# Patient Record
Sex: MALE | Race: WHITE | HISPANIC OR LATINO | Employment: OTHER | URBAN - METROPOLITAN AREA
[De-identification: names, ages, dates, MRNs, and addresses within clinical notes are randomized per-mention and may not be internally consistent; named-entity substitution may affect disease eponyms.]

---

## 2018-12-14 ENCOUNTER — HOSPITAL ENCOUNTER (EMERGENCY)
Facility: HOSPITAL | Age: 83
Discharge: HOME | End: 2018-12-15
Attending: EMERGENCY MEDICINE | Admitting: EMERGENCY MEDICINE
Payer: MEDICARE

## 2018-12-14 ENCOUNTER — APPOINTMENT (EMERGENCY)
Dept: RADIOLOGY | Facility: HOSPITAL | Age: 83
End: 2018-12-14
Payer: MEDICARE

## 2018-12-14 DIAGNOSIS — J45.909 ASTHMATIC BRONCHITIS: Primary | ICD-10-CM

## 2018-12-14 LAB
BASOPHILS # BLD AUTO: 0.06 THOUSANDS/ΜL (ref 0–0.1)
BASOPHILS NFR BLD AUTO: 1 % (ref 0–1)
EOSINOPHIL # BLD AUTO: 0.9 THOUSAND/ΜL (ref 0–0.61)
EOSINOPHIL NFR BLD AUTO: 13 % (ref 0–6)
ERYTHROCYTE [DISTWIDTH] IN BLOOD BY AUTOMATED COUNT: 12.8 % (ref 11.6–15.1)
HCT VFR BLD AUTO: 40.8 % (ref 36.5–49.3)
HGB BLD-MCNC: 13.7 G/DL (ref 12–17)
IMM GRANULOCYTES # BLD AUTO: 0.02 THOUSAND/UL (ref 0–0.2)
IMM GRANULOCYTES NFR BLD AUTO: 0 % (ref 0–2)
LYMPHOCYTES # BLD AUTO: 1.41 THOUSANDS/ΜL (ref 0.6–4.47)
LYMPHOCYTES NFR BLD AUTO: 21 % (ref 14–44)
MCH RBC QN AUTO: 31.1 PG (ref 26.8–34.3)
MCHC RBC AUTO-ENTMCNC: 33.6 G/DL (ref 31.4–37.4)
MCV RBC AUTO: 93 FL (ref 82–98)
MONOCYTES # BLD AUTO: 0.58 THOUSAND/ΜL (ref 0.17–1.22)
MONOCYTES NFR BLD AUTO: 9 % (ref 4–12)
NEUTROPHILS # BLD AUTO: 3.87 THOUSANDS/ΜL (ref 1.85–7.62)
NEUTS SEG NFR BLD AUTO: 56 % (ref 43–75)
NRBC BLD AUTO-RTO: 0 /100 WBCS
PLATELET # BLD AUTO: 248 THOUSANDS/UL (ref 149–390)
PMV BLD AUTO: 9.3 FL (ref 8.9–12.7)
RBC # BLD AUTO: 4.41 MILLION/UL (ref 3.88–5.62)
WBC # BLD AUTO: 6.84 THOUSAND/UL (ref 4.31–10.16)

## 2018-12-14 PROCEDURE — 94640 AIRWAY INHALATION TREATMENT: CPT

## 2018-12-14 PROCEDURE — 99284 EMERGENCY DEPT VISIT MOD MDM: CPT

## 2018-12-14 PROCEDURE — 80053 COMPREHEN METABOLIC PANEL: CPT | Performed by: EMERGENCY MEDICINE

## 2018-12-14 PROCEDURE — 83735 ASSAY OF MAGNESIUM: CPT | Performed by: EMERGENCY MEDICINE

## 2018-12-14 PROCEDURE — 93005 ELECTROCARDIOGRAM TRACING: CPT

## 2018-12-14 PROCEDURE — 85025 COMPLETE CBC W/AUTO DIFF WBC: CPT | Performed by: EMERGENCY MEDICINE

## 2018-12-14 PROCEDURE — 83880 ASSAY OF NATRIURETIC PEPTIDE: CPT | Performed by: EMERGENCY MEDICINE

## 2018-12-14 PROCEDURE — 84484 ASSAY OF TROPONIN QUANT: CPT | Performed by: EMERGENCY MEDICINE

## 2018-12-14 PROCEDURE — 36415 COLL VENOUS BLD VENIPUNCTURE: CPT | Performed by: EMERGENCY MEDICINE

## 2018-12-14 PROCEDURE — 71045 X-RAY EXAM CHEST 1 VIEW: CPT

## 2018-12-14 PROCEDURE — 96374 THER/PROPH/DIAG INJ IV PUSH: CPT

## 2018-12-14 RX ORDER — LEVOTHYROXINE SODIUM 0.1 MG/1
112 TABLET ORAL DAILY
Status: ON HOLD | COMMUNITY
End: 2019-12-18 | Stop reason: SDUPTHER

## 2018-12-14 RX ORDER — ATORVASTATIN CALCIUM 20 MG/1
20 TABLET, FILM COATED ORAL DAILY
COMMUNITY
End: 2020-09-21 | Stop reason: SDUPTHER

## 2018-12-14 RX ORDER — GLIPIZIDE 5 MG/1
5 TABLET, FILM COATED, EXTENDED RELEASE ORAL 2 TIMES DAILY
COMMUNITY
End: 2020-09-21 | Stop reason: SDUPTHER

## 2018-12-14 RX ORDER — TAMSULOSIN HYDROCHLORIDE 0.4 MG/1
0.4 CAPSULE ORAL
COMMUNITY
End: 2020-09-21 | Stop reason: SDUPTHER

## 2018-12-14 RX ORDER — METHYLPREDNISOLONE SODIUM SUCCINATE 125 MG/2ML
125 INJECTION, POWDER, LYOPHILIZED, FOR SOLUTION INTRAMUSCULAR; INTRAVENOUS ONCE
Status: COMPLETED | OUTPATIENT
Start: 2018-12-14 | End: 2018-12-14

## 2018-12-14 RX ADMIN — IPRATROPIUM BROMIDE 0.5 MG: 0.5 SOLUTION RESPIRATORY (INHALATION) at 23:29

## 2018-12-14 RX ADMIN — METHYLPREDNISOLONE SODIUM SUCCINATE 125 MG: 125 INJECTION, POWDER, FOR SOLUTION INTRAMUSCULAR; INTRAVENOUS at 23:31

## 2018-12-14 RX ADMIN — ALBUTEROL SULFATE 5 MG: 2.5 SOLUTION RESPIRATORY (INHALATION) at 23:29

## 2018-12-15 VITALS
DIASTOLIC BLOOD PRESSURE: 58 MMHG | SYSTOLIC BLOOD PRESSURE: 130 MMHG | WEIGHT: 148.59 LBS | TEMPERATURE: 98.7 F | RESPIRATION RATE: 20 BRPM | HEART RATE: 94 BPM | OXYGEN SATURATION: 100 %

## 2018-12-15 LAB
ALBUMIN SERPL BCP-MCNC: 3.5 G/DL (ref 3.5–5)
ALP SERPL-CCNC: 60 U/L (ref 46–116)
ALT SERPL W P-5'-P-CCNC: 24 U/L (ref 12–78)
ANION GAP SERPL CALCULATED.3IONS-SCNC: 7 MMOL/L (ref 4–13)
AST SERPL W P-5'-P-CCNC: 16 U/L (ref 5–45)
BILIRUB SERPL-MCNC: 0.3 MG/DL (ref 0.2–1)
BUN SERPL-MCNC: 17 MG/DL (ref 5–25)
CALCIUM SERPL-MCNC: 8.7 MG/DL (ref 8.3–10.1)
CHLORIDE SERPL-SCNC: 103 MMOL/L (ref 100–108)
CO2 SERPL-SCNC: 30 MMOL/L (ref 21–32)
CREAT SERPL-MCNC: 0.9 MG/DL (ref 0.6–1.3)
GFR SERPL CREATININE-BSD FRML MDRD: 80 ML/MIN/1.73SQ M
GLUCOSE SERPL-MCNC: 225 MG/DL (ref 65–140)
MAGNESIUM SERPL-MCNC: 2 MG/DL (ref 1.6–2.6)
NT-PROBNP SERPL-MCNC: 33 PG/ML
POTASSIUM SERPL-SCNC: 4.3 MMOL/L (ref 3.5–5.3)
PROT SERPL-MCNC: 7.2 G/DL (ref 6.4–8.2)
SODIUM SERPL-SCNC: 140 MMOL/L (ref 136–145)
TROPONIN I SERPL-MCNC: 0.02 NG/ML

## 2018-12-15 RX ORDER — GUAIFENESIN/DEXTROMETHORPHAN 100-10MG/5
10 SYRUP ORAL ONCE
Status: COMPLETED | OUTPATIENT
Start: 2018-12-15 | End: 2018-12-15

## 2018-12-15 RX ORDER — AZITHROMYCIN 250 MG/1
500 TABLET, FILM COATED ORAL ONCE
Status: COMPLETED | OUTPATIENT
Start: 2018-12-15 | End: 2018-12-15

## 2018-12-15 RX ORDER — ALBUTEROL SULFATE 90 UG/1
1 AEROSOL, METERED RESPIRATORY (INHALATION) ONCE
Status: COMPLETED | OUTPATIENT
Start: 2018-12-15 | End: 2018-12-15

## 2018-12-15 RX ORDER — AZITHROMYCIN 250 MG/1
250 TABLET, FILM COATED ORAL DAILY
Qty: 4 TABLET | Refills: 0 | Status: SHIPPED | OUTPATIENT
Start: 2018-12-15 | End: 2018-12-19

## 2018-12-15 RX ORDER — PREDNISONE 20 MG/1
20 TABLET ORAL DAILY
Qty: 12 TABLET | Refills: 0 | Status: SHIPPED | OUTPATIENT
Start: 2018-12-15 | End: 2018-12-19

## 2018-12-15 RX ADMIN — AZITHROMYCIN 500 MG: 250 TABLET, FILM COATED ORAL at 00:44

## 2018-12-15 RX ADMIN — ALBUTEROL SULFATE 1 PUFF: 90 AEROSOL, METERED RESPIRATORY (INHALATION) at 00:44

## 2018-12-15 RX ADMIN — ALBUTEROL SULFATE 5 MG: 2.5 SOLUTION RESPIRATORY (INHALATION) at 00:08

## 2018-12-15 RX ADMIN — IPRATROPIUM BROMIDE 0.5 MG: 0.5 SOLUTION RESPIRATORY (INHALATION) at 00:08

## 2018-12-15 RX ADMIN — GUAIFENESIN AND DEXTROMETHORPHAN 10 ML: 100; 10 SYRUP ORAL at 00:22

## 2018-12-15 NOTE — DISCHARGE INSTRUCTIONS
Bronquitis aguda   LO QUE NECESITA SABER:   La bronquitis aguda es la inflamación e irritación de emma vías respiratorias  Esta irritación puede provocar que tosa o que tenga otros problemas de respiración  La bronquitis aguda suele comenzar debido a otra enfermedad, sallie un resfriado o la gripe  La enfermedad se propaga de ramirez nariz y garganta a ramirez tráquea y Marcine Federico  La bronquitis a menudo es conocida sallie resfriado de pecho  La bronquitis aguda generalmente dura alrededor de 3 a 6 semanas y no es felipe enfermedad grave  La tos podría durar por varias semanas  [facility]CCIONES SOBRE [facility]:   Regrese a la joo de emergencias si:   · Usted expectora priyank  · Emma labios o uñas de los dedos se ponen azules  · Usted siente que no está recibiendo suficiente aire cuando respira  Pregúntele a ramirez [place] vitaminas y minerales son adecuados para usted  · Usted tiene fiebre  · Emma problemas respiratorios no desaparecen o empeoran  · Ramirez tos no mejora dentro de 4 semanas  · Usted tiene preguntas o inquietudes acerca de ramirez condición o cuidado  Cuidados personales:   · Descanse más  El descanso ayuda a ramirez cuerpo a sanar  Empiece a hacer un poco más día a día  Descanse cuando usted sienta que es necesario  · Evite irritantes en el aire  Evite productos químicos, gases y polvo  Use felipe mascarilla si tiene que trabajar alrededor de polvo o gases  Permanezca dentro cuando los niveles de contaminación mervat altos  Si tiene alergias, permanezca adentro cuando el conteo de polen sea CROSSCANONBY  No use productos en aerosol, sallie desodorante, aerosol contra los insectos y aerosol para el joby  · No fume o esté alrededor de personas que fuman  La nicotina y otros químicos en los cigarrillos y cigarros dañan la cilia que saca la mucosidad de emma pulmones  Pida información a ramirez médico si usted actualmente fuma y necesita ayuda para dejar de fumar   Los cigarrillos electrónicos o tabaco sin humo todavía contienen nicotina  Consulte con ramirez médico antes de [employer]  · [address] se le haya indicado  Los líquidos ayudan a mantener humectadas bettye vías respiratorias y ayudarle a eliminar las flemas por medio de la tos  Es posible que usted necesite lucho más líquidos si tiene bronquitis [place]  Pregunte cuánto líquido debe lucho cada día y cuáles líquidos son los más adecuados para usted  · Use un humidificador o vaporizador  Use un humidificador de kristen frío o un vaporizador para elevar la humedad en ramirez casa  [place] podría ayudarle a respirar más fácilmente y al mismo tiempo disminuir la tos  Disminuya ramirez riesgo para la bronquitis aguda:   · Vaya a que le pongan las vacunas necesarias  Pregúntele a ramirez médico si usted debería ser vacunado contra la gripe o la neumonía  · Evite la propagación de gérmenes  Usted puede disminuir ramirez riesgo de bronquitis [place] y otras enfermedades de la siguiente manera:     ¨ [employer] frecuentemente con agua y [place]  Lleve felipe loción o gel antiséptico para las heather  Usted puede usar la loción o el gel para limpiar bettye heather cuando no haya agua disponible  ¨ No se toque los ojos, la nariz o la boca a menos que se haya lavado las heather silviano  ¨ Siempre cubra ramirez boca al toser para evitar la propagación de gérmenes  Lo mejor es toser en un pañuelo desechable o en la manga de ramirez camisa, en lugar de en ramirez mano  [place] a los que le rodean que cubran bettye bocas al toser  ¨ Trate de evitar a las personas que están resfriadas o tienen gripe  Si usted está enfermo, manténgase alejado de otras personas lo más que pueda  Medicamentos:  Ramirez médico podría  darle cualquiera de lo siguiente:  · El ibuprofeno o el acetaminofeno  son medicamentos que pueden ayudar a bajar ramirez fiebre  Están disponibles sin receta médica  Consulte con ramirez médico cuál medicamento es el adecuado para usted  Pregunte la cantidad y la frecuencia con que debe tomarlos  [address]  Estos medicamentos pueden provocar sangrado estomacal si no se thea correctamente  El ibuprofeno puede provocar daño al Jovan Grise  No tome ibuprofeno si usted tiene enfermedad de los riñones, [place] o si es alérgico a la aspirina  El acetaminofeno puede dañar el hígado  No tome más de 4,000 miligramos en 24 horas  · Los descongestionantes  ayudan a despejar la mucosidad en bettye pulmones y que sea más fácil expectorarla  [place] puede ayudarle a respirar mejor  · Los jarabes para la tos  disminuyen ramirez necesidad de toser  Si ramirez tos produce mucosidad, no tome un supresor de la tos a menos que ramirez médico se lo indique  Ramirez médico podría sugerirle que tome un supresor de la tos en la noche para que pueda descansar  · Inhaladores  podrían ser [place]  Ramirez médico podría darle belgica o más inhaladores para ayudarle a respirar más fácil y [place] menos tos  Un inhalador le administra medicamento para abrir bettye vías aéreas  [place] a ramirez médico que le muestre cómo usar ramirez inhalador correctamente  · [place] bettye medicamentos sallie se le haya indicado  Consulte con ramirez médico si usted saba que ramirez medicamento no le está ayudando o si presenta efectos secundarios  Infórmele si es alérgico a algún medicamento  Mantenga felipe lista actualizada de los [place], las vitaminas y los productos herbales que ramón  Incluya los siguientes datos de los medicamentos: cantidad, frecuencia y motivo de administración  Traiga con usted la lista o los envases de la píldoras a bettye citas de seguimiento  Lleve la lista de los medicamentos con usted en tyler de felipe emergencia  Acuda a bettye consultas de control con ramirez médico según le indicaron  Escriba las preguntas que tenga para que recuerde hacerlas geena bettye citas de seguimiento  © 2017 [address] Information is for End User's use only and may not be sold, redistributed or otherwise used for commercial purposes   All illustrations and images included in Jonny are the copyrighted property of A D A M , Inc  or [place]  Esta información es sólo para uso en educación  Ramirez intención no es darle un consejo médico sobre enfermedades o tratamientos  Colsulte con armirez Elpidio Pinta farmacéutico antes de seguir cualquier régimen médico para saber si es seguro y efectivo para usted

## 2018-12-15 NOTE — ED PROVIDER NOTES
History  Chief Complaint   Patient presents with    Cough     per daughter"pt  has been coughing for some hrs now and was complaining of some SOB about 45mns ago,"     82year-old gentleman comes in for abrupt onset of shortness of breath  Patient states he was sitting at home approximately 45 minutes ago he began to have trouble breathing and began to wheeze  Patient states he does have a history of asthma and has similar episode about a year and half ago that self resolved  Patient does not have a smoking history and has no formal diagnosis of COPD  Patient is a diabetic and does have hypertension he denies any chest pain  History provided by:  Patient and relative   used: No    Shortness of Breath   Severity:  Severe  Onset quality:  Sudden  Duration:  45 minutes  Timing:  Constant  Progression:  Worsening  Chronicity:  New  Context: not activity, not occupational exposure and not weather changes    Worsened by: Activity  Ineffective treatments:  None tried  Associated symptoms: wheezing    Associated symptoms: no abdominal pain, no chest pain, no cough, no fever, no headaches, no rash and no vomiting    Risk factors: no recent alcohol use, no obesity and no prolonged immobilization        Prior to Admission Medications   Prescriptions Last Dose Informant Patient Reported? Taking?    Linagliptin (TRADJENTA) 5 MG TABS   Yes Yes   Sig: Take 5 mg by mouth daily   atorvastatin (LIPITOR) 20 mg tablet   Yes Yes   Sig: Take 20 mg by mouth daily   glipiZIDE (GLUCOTROL XL) 5 mg 24 hr tablet   Yes Yes   Sig: Take 5 mg by mouth 2 (two) times a day   levothyroxine 100 mcg tablet   Yes Yes   Sig: Take 112 mcg by mouth daily   metFORMIN (GLUCOPHAGE) 1000 MG tablet   Yes Yes   Sig: Take 1,000 mg by mouth 2 (two) times a day with meals   tamsulosin (FLOMAX) 0 4 mg   Yes Yes   Sig: Take 0 4 mg by mouth daily with dinner      Facility-Administered Medications: None       Past Medical History: Diagnosis Date    Diabetes mellitus ([facility] [address] )        History reviewed  No pertinent surgical history  History reviewed  No pertinent family history  I have reviewed and agree with the history as documented  Social History   Substance Use Topics    Smoking status: Never Smoker    Smokeless tobacco: Never Used    Alcohol use No        Review of Systems   Constitutional: Negative for activity change, appetite change and fever  HENT: Negative for congestion and facial swelling  Eyes: Negative for discharge and redness  Respiratory: Positive for shortness of breath and wheezing  Negative for cough  Cardiovascular: Negative for chest pain and leg swelling  Gastrointestinal: Negative for abdominal distention, abdominal pain, blood in stool and vomiting  Endocrine: Negative for cold intolerance and polydipsia  Genitourinary: Negative for difficulty urinating and hematuria  Musculoskeletal: Negative for arthralgias and gait problem  Skin: Negative for color change and rash  Allergic/Immunologic: Negative for food allergies and immunocompromised state  Neurological: Negative for dizziness, seizures and headaches  Hematological: Negative for adenopathy  Does not bruise/bleed easily  Psychiatric/Behavioral: Negative for agitation, confusion and decreased concentration  All other systems reviewed and are negative  Physical Exam  Physical Exam   Constitutional: He is oriented to person, place, and time  He appears well-developed and well-nourished  Non-toxic appearance  HENT:   Head: Normocephalic and atraumatic  Right Ear: Tympanic membrane normal    Left Ear: Tympanic membrane normal    Nose: Nose normal    Mouth/Throat: Oropharynx is clear and moist    Eyes: Pupils are equal, round, and reactive to light  Conjunctivae, EOM and lids are normal    Neck: Trachea normal and normal range of motion  Neck supple  No JVD present  Carotid bruit is not present     Cardiovascular: Normal rate, regular rhythm, normal heart sounds and intact distal pulses  No extrasystoles are present  Pulmonary/Chest: Effort normal  Tachypnea noted  He has no decreased breath sounds  He has wheezes  He has no rhonchi  He has no rales  He exhibits no tenderness and no deformity  Abdominal: Soft  Bowel sounds are normal  There is no tenderness  There is no rebound, no guarding and no CVA tenderness  Musculoskeletal:        Right shoulder: He exhibits normal range of motion, no tenderness, no swelling and no deformity  Cervical back: Normal  He exhibits normal range of motion, no tenderness, no bony tenderness and no deformity  Lymphadenopathy:     He has no cervical adenopathy  He has no axillary adenopathy  Neurological: He is alert and oriented to person, place, and time  He has normal strength and normal reflexes  No cranial nerve deficit or sensory deficit  He displays a negative Romberg sign  Skin: Skin is warm and dry  Psychiatric: He has a normal mood and affect  His speech is normal and behavior is normal  Judgment and thought content normal  Cognition and memory are normal    Nursing note and vitals reviewed        Vital Signs  ED Triage Vitals [12/14/18 2316]   Temperature Pulse Respirations Blood Pressure SpO2   98 7 °F (37 1 °C) 85 20 132/61 92 %      Temp Source Heart Rate Source Patient Position - Orthostatic VS BP Location FiO2 (%)   Oral Monitor Sitting Right arm --      Pain Score       Worst Possible Pain           Vitals:    12/14/18 2316 12/15/18 0015   BP: 132/61 130/58   Pulse: 85 94   Patient Position - Orthostatic VS: Sitting Sitting       Visual Acuity      ED Medications  Medications   albuterol (PROVENTIL HFA,VENTOLIN HFA) inhaler 1 puff (not administered)   azithromycin (ZITHROMAX) tablet 500 mg (not administered)   albuterol inhalation solution 5 mg (5 mg Nebulization Given 12/14/18 2329)   ipratropium (ATROVENT) 0 02 % inhalation solution 0 5 mg (0 5 mg Nebulization Given 12/14/18 2329)   methylPREDNISolone sodium succinate (Solu-MEDROL) injection 125 mg (125 mg Intravenous Given 12/14/18 2331)   albuterol inhalation solution 5 mg (5 mg Nebulization Given 12/15/18 0008)   ipratropium (ATROVENT) 0 02 % inhalation solution 0 5 mg (0 5 mg Nebulization Given 12/15/18 0008)   dextromethorphan-guaiFENesin (ROBITUSSIN DM)  mg/5 mL oral syrup 10 mL (10 mL Oral Given 12/15/18 0022)       Diagnostic Studies  Results Reviewed     Procedure Component Value Units Date/Time    Magnesium [[id number]]  (Normal) Collected:  12/14/18 2339    Lab Status:  Final result Specimen:  Blood from Arm, Left Updated:  12/15/18 0012     Magnesium 2 0 mg/dL     B-type natriuretic peptide [[id number]]  (Normal) Collected:  12/14/18 2339    Lab Status:  Final result Specimen:  Blood from Arm, Left Updated:  12/15/18 0012     NT-proBNP 33 pg/mL     Troponin I [[id number]]  (Normal) Collected:  12/14/18 2339    Lab Status:  Final result Specimen:  Blood from Arm, Left Updated:  12/15/18 0006     Troponin I 0 02 ng/mL     Comprehensive metabolic panel [[id number]]  (Abnormal) Collected:  12/14/18 2339    Lab Status:  Final result Specimen:  Blood from Arm, Left Updated:  12/15/18 0003     Sodium 140 mmol/L      Potassium 4 3 mmol/L      Chloride 103 mmol/L      CO2 30 mmol/L      ANION GAP 7 mmol/L      BUN 17 mg/dL      Creatinine 0 90 mg/dL      Glucose 225 (H) mg/dL      Calcium 8 7 mg/dL      AST 16 U/L      ALT 24 U/L      Alkaline Phosphatase 60 U/L      Total Protein 7 2 g/dL      Albumin 3 5 g/dL      Total Bilirubin 0 30 mg/dL      eGFR 80 ml/min/1 73sq m     Narrative:         National Kidney Disease Education Program recommendations are as follows:  GFR calculation is accurate only with a steady state creatinine  Chronic Kidney disease less than 60 ml/min/1 73 sq  meters  Kidney failure less than 15 ml/min/1 73 sq  meters      CBC and differential [[id number]]  (Abnormal) Collected: 12/14/18 2339    Lab Status:  Final result Specimen:  Blood from Arm, Left Updated:  12/14/18 2343     WBC 6 84 Thousand/uL      RBC 4 41 Million/uL      Hemoglobin 13 7 g/dL      Hematocrit 40 8 %      MCV 93 fL      MCH 31 1 pg      MCHC 33 6 g/dL      RDW 12 8 %      MPV 9 3 fL      Platelets 465 Thousands/uL      nRBC 0 /100 WBCs      Neutrophils Relative 56 %      Immat GRANS % 0 %      Lymphocytes Relative 21 %      Monocytes Relative 9 %      Eosinophils Relative 13 (H) %      Basophils Relative 1 %      Neutrophils Absolute 3 87 Thousands/µL      Immature Grans Absolute 0 02 Thousand/uL      Lymphocytes Absolute 1 41 Thousands/µL      Monocytes Absolute 0 58 Thousand/µL      Eosinophils Absolute 0 90 (H) Thousand/µL      Basophils Absolute 0 06 Thousands/µL                  XR chest 1 view portable    (Results Pending)              Procedures  Procedures       Phone Contacts  ED Phone Contact    ED Course                               MDM  Number of Diagnoses or Management Options  Asthmatic bronchitis: new and requires workup     Amount and/or Complexity of Data Reviewed  Clinical lab tests: ordered and reviewed  Tests in the radiology section of CPT®: ordered and reviewed  Tests in the medicine section of CPT®: ordered and reviewed  Independent visualization of images, tracings, or specimens: yes    Risk of Complications, Morbidity, and/or Mortality  General comments: Discussed with patient and daughter offered admission patient does not want admission at this time states he feels much better will send him home with albuterol inhaler azithromycin and steroids patient to return if develops any shortness of breath or fever      Patient Progress  Patient progress: improved    CritCare Time    Disposition  Final diagnoses:   Asthmatic bronchitis     Time reflects when diagnosis was documented in both MDM as applicable and the Disposition within this note     Time User Action Codes Description Comment 12/15/2018 12:35 AM Janki Florian Mounika [J45 [id number]] Asthmatic bronchitis       ED Disposition     ED Disposition Condition Comment    Discharge  Jacque Baeza discharge to home/self care  Condition at discharge: Good        Follow-up Information     Follow up With Specialties Details Why Contact Info    Мария Sky MD Internal Medicine Schedule an appointment as soon as possible for a visit  [phone]  [phone]            Patient's Medications   Discharge Prescriptions    AZITHROMYCIN (ZITHROMAX) 250 MG TABLET    Take 1 tablet (250 mg total) by mouth daily for 4 days       Start Date: 12/15/2018End Date: 12/19/2018       Order Dose: 250 mg       Quantity: 4 tablet    Refills: 0    PREDNISONE (DELTASONE) 20 MG TABLET    Take 1 tablet (20 mg total) by mouth daily for 4 days Take 3 tabs daily for 4 days  Start Date: 12/15/2018End Date: 12/19/2018       Order Dose: 20 mg       Quantity: 12 tablet    Refills: 0     No discharge procedures on file      ED Provider  Electronically Signed by           Ashleigh Farah DO  12/15/18 9080

## 2018-12-20 LAB
ATRIAL RATE: 86 BPM
P AXIS: 73 DEGREES
PR INTERVAL: 190 MS
QRS AXIS: 81 DEGREES
QRSD INTERVAL: 84 MS
QT INTERVAL: 344 MS
QTC INTERVAL: 411 MS
T WAVE AXIS: 74 DEGREES
VENTRICULAR RATE: 86 BPM

## 2018-12-20 PROCEDURE — 93010 ELECTROCARDIOGRAM REPORT: CPT | Performed by: INTERNAL MEDICINE

## 2019-02-15 PROCEDURE — 99285 EMERGENCY DEPT VISIT HI MDM: CPT

## 2019-02-15 PROCEDURE — 94640 AIRWAY INHALATION TREATMENT: CPT

## 2019-02-15 RX ORDER — ALBUTEROL SULFATE 2.5 MG/3ML
5 SOLUTION RESPIRATORY (INHALATION) ONCE
Status: COMPLETED | OUTPATIENT
Start: 2019-02-15 | End: 2019-02-15

## 2019-02-15 RX ADMIN — IPRATROPIUM BROMIDE 0.5 MG: 0.5 SOLUTION RESPIRATORY (INHALATION) at 23:03

## 2019-02-15 RX ADMIN — ALBUTEROL SULFATE 5 MG: 2.5 SOLUTION RESPIRATORY (INHALATION) at 23:03

## 2019-02-16 ENCOUNTER — HOSPITAL ENCOUNTER (EMERGENCY)
Facility: HOSPITAL | Age: 84
Discharge: HOME | End: 2019-02-16
Attending: EMERGENCY MEDICINE
Payer: MEDICARE

## 2019-02-16 ENCOUNTER — APPOINTMENT (EMERGENCY)
Dept: RADIOLOGY | Facility: HOSPITAL | Age: 84
End: 2019-02-16
Payer: MEDICARE

## 2019-02-16 VITALS
RESPIRATION RATE: 18 BRPM | WEIGHT: 138 LBS | HEART RATE: 62 BPM | SYSTOLIC BLOOD PRESSURE: 142 MMHG | TEMPERATURE: 98.4 F | OXYGEN SATURATION: 94 % | DIASTOLIC BLOOD PRESSURE: 63 MMHG

## 2019-02-16 DIAGNOSIS — J45.901 ASTHMA EXACERBATION: Primary | ICD-10-CM

## 2019-02-16 DIAGNOSIS — B34.9 VIRAL SYNDROME: ICD-10-CM

## 2019-02-16 LAB
ANION GAP SERPL CALCULATED.3IONS-SCNC: 6 MMOL/L (ref 4–13)
BASOPHILS # BLD AUTO: 0.06 THOUSANDS/ΜL (ref 0–0.1)
BASOPHILS NFR BLD AUTO: 1 % (ref 0–1)
BUN SERPL-MCNC: 19 MG/DL (ref 5–25)
CALCIUM SERPL-MCNC: 8.4 MG/DL (ref 8.3–10.1)
CHLORIDE SERPL-SCNC: 107 MMOL/L (ref 100–108)
CO2 SERPL-SCNC: 28 MMOL/L (ref 21–32)
CREAT SERPL-MCNC: 0.82 MG/DL (ref 0.6–1.3)
EOSINOPHIL # BLD AUTO: 1.3 THOUSAND/ΜL (ref 0–0.61)
EOSINOPHIL NFR BLD AUTO: 16 % (ref 0–6)
ERYTHROCYTE [DISTWIDTH] IN BLOOD BY AUTOMATED COUNT: 13.5 % (ref 11.6–15.1)
FLUAV AG SPEC QL IA: NEGATIVE
FLUAV AG SPEC QL: NOT DETECTED
FLUBV AG SPEC QL IA: NEGATIVE
FLUBV AG SPEC QL: NOT DETECTED
GFR SERPL CREATININE-BSD FRML MDRD: 83 ML/MIN/1.73SQ M
GLUCOSE SERPL-MCNC: 160 MG/DL (ref 65–140)
HCT VFR BLD AUTO: 40.6 % (ref 36.5–49.3)
HGB BLD-MCNC: 13.1 G/DL (ref 12–17)
IMM GRANULOCYTES # BLD AUTO: 0.02 THOUSAND/UL (ref 0–0.2)
IMM GRANULOCYTES NFR BLD AUTO: 0 % (ref 0–2)
LYMPHOCYTES # BLD AUTO: 2.52 THOUSANDS/ΜL (ref 0.6–4.47)
LYMPHOCYTES NFR BLD AUTO: 31 % (ref 14–44)
MCH RBC QN AUTO: 30.5 PG (ref 26.8–34.3)
MCHC RBC AUTO-ENTMCNC: 32.3 G/DL (ref 31.4–37.4)
MCV RBC AUTO: 95 FL (ref 82–98)
MONOCYTES # BLD AUTO: 0.9 THOUSAND/ΜL (ref 0.17–1.22)
MONOCYTES NFR BLD AUTO: 11 % (ref 4–12)
NEUTROPHILS # BLD AUTO: 3.26 THOUSANDS/ΜL (ref 1.85–7.62)
NEUTS SEG NFR BLD AUTO: 41 % (ref 43–75)
NRBC BLD AUTO-RTO: 0 /100 WBCS
NT-PROBNP SERPL-MCNC: 23 PG/ML
PLATELET # BLD AUTO: 223 THOUSANDS/UL (ref 149–390)
PMV BLD AUTO: 9.4 FL (ref 8.9–12.7)
POTASSIUM SERPL-SCNC: 4.2 MMOL/L (ref 3.5–5.3)
RBC # BLD AUTO: 4.29 MILLION/UL (ref 3.88–5.62)
RSV B RNA SPEC QL NAA+PROBE: NOT DETECTED
SODIUM SERPL-SCNC: 141 MMOL/L (ref 136–145)
TROPONIN I SERPL-MCNC: <0.02 NG/ML
WBC # BLD AUTO: 8.06 THOUSAND/UL (ref 4.31–10.16)

## 2019-02-16 PROCEDURE — 80048 BASIC METABOLIC PNL TOTAL CA: CPT | Performed by: EMERGENCY MEDICINE

## 2019-02-16 PROCEDURE — 87631 RESP VIRUS 3-5 TARGETS: CPT | Performed by: EMERGENCY MEDICINE

## 2019-02-16 PROCEDURE — 36415 COLL VENOUS BLD VENIPUNCTURE: CPT | Performed by: EMERGENCY MEDICINE

## 2019-02-16 PROCEDURE — 71046 X-RAY EXAM CHEST 2 VIEWS: CPT

## 2019-02-16 PROCEDURE — 84484 ASSAY OF TROPONIN QUANT: CPT | Performed by: EMERGENCY MEDICINE

## 2019-02-16 PROCEDURE — 85025 COMPLETE CBC W/AUTO DIFF WBC: CPT | Performed by: EMERGENCY MEDICINE

## 2019-02-16 PROCEDURE — 83880 ASSAY OF NATRIURETIC PEPTIDE: CPT | Performed by: EMERGENCY MEDICINE

## 2019-02-16 RX ORDER — PREDNISONE 20 MG/1
40 TABLET ORAL ONCE
Status: COMPLETED | OUTPATIENT
Start: 2019-02-16 | End: 2019-02-16

## 2019-02-16 RX ORDER — ALBUTEROL SULFATE 90 UG/1
2 AEROSOL, METERED RESPIRATORY (INHALATION) EVERY 4 HOURS PRN
Qty: 1 INHALER | Refills: 0 | Status: SHIPPED | OUTPATIENT
Start: 2019-02-16 | End: 2020-07-09

## 2019-02-16 RX ORDER — PREDNISONE 50 MG/1
50 TABLET ORAL DAILY
Qty: 5 TABLET | Refills: 0 | Status: SHIPPED | OUTPATIENT
Start: 2019-02-16 | End: 2019-02-21

## 2019-02-16 RX ADMIN — ALBUTEROL SULFATE 5 MG: 2.5 SOLUTION RESPIRATORY (INHALATION) at 02:07

## 2019-02-16 RX ADMIN — IPRATROPIUM BROMIDE 0.5 MG: 0.5 SOLUTION RESPIRATORY (INHALATION) at 02:07

## 2019-02-16 RX ADMIN — PREDNISONE 40 MG: 20 TABLET ORAL at 02:07

## 2019-02-16 NOTE — ED NOTES
Reassessed patient post breathing treatment  Pt reports feeling better and breathing easier   Pt no longer has audible wheezing     Jackeline Hernandez RN  02/15/19 5221

## 2019-02-16 NOTE — ED ATTENDING ATTESTATION
Gianfranco Farley MD, saw and evaluated the patient  I have discussed the patient with the resident/non-physician practitioner and agree with the resident's/non-physician practitioner's findings, Plan of Care, and MDM as documented in the resident's/non-physician practitioner's note, except where noted  All available labs and Radiology studies were reviewed  At this point I agree with the current assessment done in the Emergency Department  I have conducted an independent evaluation of this patient including a focused history of:    Emergency Department Note- Gerry Millard 82 y o  male MRN: [id number]    Unit/Bed#: ED 23 Encounter: [id number]    Gerry Millard is a 82 y o  male who presents with   Chief Complaint   Patient presents with    Shortness of Breath     saughter reports patient is "taking short breaths  He can't breath  He has phlem in his chest"         History of Present Illness   HPI:  Gerry Millard is a 82 y o  male who presents for evaluation of:  Cough, congestion, sputum production, trouble breathing  Patient noted the onset of symptoms on Thursday and symptoms have worsened  Patient does not and has never smoked cigarrettes  Patient has no h/o lung disease  Seen at [address] [place] [address] for similar symptoms   Review of Systems   Constitutional: Negative for chills and fever  HENT: Positive for congestion and rhinorrhea  Respiratory: Positive for cough and shortness of breath  Cardiovascular: Negative for chest pain and palpitations  All other systems reviewed and are negative  Historical Information   Past Medical History:   Diagnosis Date    Asthma     [facility] mellitus ([facility] [address] )     Glaucoma      History reviewed  No pertinent surgical history    Social History   Social History     Substance and Sexual Activity   Alcohol Use No     Social History     Substance and Sexual Activity   Drug Use No     Social History     Tobacco Use   Smoking Status Never Smoker   Smokeless Tobacco Never Used     Family History: non-contributory    Meds/Allergies   all medications and allergies reviewed  No Known Allergies    Objective   First Vitals:   Blood Pressure: 132/60 (02/15/19 2242)  Pulse: 62 (02/15/19 2242)  Temperature: 98 4 °F (36 9 °C) (02/15/19 2242)  Temp Source: Oral (02/15/19 2242)  Respirations: 22 (02/15/19 2242)  Weight - Scale: 62 6 kg (138 lb) (02/15/19 2242)  SpO2: 91 % (02/15/19 2242)    Current Vitals:   Blood Pressure: 144/64 (19)  Pulse: 61 (19)  Temperature: 98 4 °F (36 9 °C) (02/15/19 2242)  Temp Source: Oral (02/15/19 2242)  Respirations: 18 (19)  Weight - Scale: 62 6 kg (138 lb) (02/15/19 2242)  SpO2: 98 % (19)    No intake or output data in the 24 hours ending 19    Invasive Devices          None          Physical Exam   Constitutional: He is oriented to person, place, and time  He appears well-developed and well-nourished  HENT:   Head: Normocephalic and atraumatic  Neck: Normal range of motion  Neck supple  Pulmonary/Chest: No accessory muscle usage or stridor  No respiratory distress  He has wheezes in the right middle field, the right lower field, the left middle field and the left lower field  He has rhonchi in the right middle field, the right lower field, the left middle field and the left lower field  Abdominal: Soft  Bowel sounds are normal    Musculoskeletal:        Right lower leg: Normal         Left lower leg: Normal    Neurological: He is alert and oriented to person, place, and time  Skin: Skin is warm and dry  Capillary refill takes less than 2 seconds  Psychiatric: He has a normal mood and affect  His behavior is normal    Nursing note and vitals reviewed  Medical Decision Makin  Acute wheezing and dyspnea: CXR r/o pneumonia; CBC r/o anemia; BNP r/o CHF;  Tn and ECG r/o ACS; duoneb; corticosteroids  No results found for this or any previous visit (from the past 36 hour(s))  XR chest 2 views    (Results Pending)         Portions of the record may have been created with voice recognition software  Occasional wrong word or "sound a like" substitutions may have occurred due to the inherent limitations of voice recognition software  Read the chart carefully and recognize, using context, where substitutions have occurred

## 2019-02-16 NOTE — ED PROVIDER NOTES
History  Chief Complaint   Patient presents with    Shortness of Breath     saughter reports patient is "taking short breaths  He can't breath  He has phlem in his chest"     81-year-old man with a history of asthma and diabetes presents for evaluation of dyspnea  Patient and his daughter report a 2 day history of fevers, rhinorrhea, and cough  Patient was short of breath while coughing today prompting his daughter to bring him in for evaluation  He has a history of asthma and uses p r n  Albuterol MDI  No previous hospitalizations for asthma exacerbation  No cardiac history or history of VTE  No other risk factors  Patient did not receive the influenza vaccination  Sick contact noted in his daughter who has similar symptoms  In triage, patient was hypoxic at 91% with wheezing on exam for which he was given a DuoNeb  On my evaluation, patient is afebrile with otherwise normal vital signs  He has a normal work of breathing with wheezing  Will perform cardiac workup including chest x-ray to evaluate for underlying pneumonia  Will administer DuoNeb, prednisone, and reassess  Prior to Admission Medications   Prescriptions Last Dose Informant Patient Reported? Taking? Linagliptin (TRADJENTA) 5 MG TABS   Yes No   Sig: Take 5 mg by mouth daily   atorvastatin (LIPITOR) 20 mg tablet   Yes No   Sig: Take 20 mg by mouth daily   glipiZIDE (GLUCOTROL XL) 5 mg 24 hr tablet   Yes No   Sig: Take 5 mg by mouth 2 (two) times a day   levothyroxine 100 mcg tablet   Yes No   Sig: Take 112 mcg by mouth daily   metFORMIN (GLUCOPHAGE) 1000 MG tablet   Yes No   Sig: Take 1,000 mg by mouth 2 (two) times a day with meals   tamsulosin (FLOMAX) 0 4 mg   Yes No   Sig: Take 0 4 mg by mouth daily with dinner      Facility-Administered Medications: None       Past Medical History:   Diagnosis Date    Asthma     Diabetes mellitus ([facility] [address] )     Glaucoma        History reviewed  No pertinent surgical history      History reviewed  No pertinent family history  I have reviewed and agree with the history as documented  Social History     Tobacco Use    Smoking status: Never Smoker    Smokeless tobacco: Never Used   Substance Use Topics    Alcohol use: No    Drug use: No        Review of Systems   Constitutional: Positive for fever  Negative for chills  HENT: Positive for congestion and rhinorrhea  Negative for sore throat  Eyes: Negative for photophobia and visual disturbance  Respiratory: Positive for cough and shortness of breath  Cardiovascular: Positive for chest pain (With coughing)  Negative for leg swelling  Gastrointestinal: Negative for abdominal pain, diarrhea, nausea and vomiting  Genitourinary: Negative for dysuria, frequency and hematuria  Musculoskeletal: Negative for back pain and neck pain  Skin: Negative for rash and wound  Neurological: Negative for light-headedness and headaches  Physical Exam  ED Triage Vitals   Temperature Pulse Respirations Blood Pressure SpO2   02/15/19 2242 02/15/19 2242 02/15/19 2242 02/15/19 2242 02/15/19 2242   98 4 °F (36 9 °C) 62 22 132/60 91 %      Temp Source Heart Rate Source Patient Position - Orthostatic VS BP Location FiO2 (%)   02/15/19 2242 02/15/19 2242 02/15/19 2242 02/15/19 2242 --   Oral Monitor Sitting Right arm       Pain Score       02/16/19 0102       No Pain           Orthostatic Vital Signs  Vitals:    02/15/19 2242 02/16/19 0102 02/16/19 0340   BP: 132/60 144/64 142/63   Pulse: 62 61 62   Patient Position - Orthostatic VS: Sitting  Lying       Physical Exam   Constitutional: He is oriented to person, place, and time  He appears well-developed and well-nourished  No distress  HENT:   Head: Normocephalic and atraumatic  Eyes: Pupils are equal, round, and reactive to light  Conjunctivae are normal  No scleral icterus  Neck: Neck supple  No JVD present  Cardiovascular: Normal rate, regular rhythm and normal heart sounds   Exam reveals no gallop and no friction rub  No murmur heard  Pulmonary/Chest: Effort normal  No respiratory distress  He has wheezes (Diffuse)  He has no rales  Abdominal: Soft  He exhibits no distension  There is no tenderness  There is no rebound and no guarding  Musculoskeletal: He exhibits no edema or tenderness  Neurological: He is alert and oriented to person, place, and time  Skin: Skin is warm and dry  He is not diaphoretic  Psychiatric: He has a normal mood and affect  His behavior is normal  Thought content normal    Vitals reviewed  ED Medications  Medications   albuterol inhalation solution 5 mg (5 mg Nebulization Given 2/15/19 2303)   ipratropium (ATROVENT) 0 02 % inhalation solution 0 5 mg (0 5 mg Nebulization Given 2/15/19 2303)   albuterol inhalation solution 5 mg (5 mg Nebulization Given 2/16/19 0207)   ipratropium (ATROVENT) 0 02 % inhalation solution 0 5 mg (0 5 mg Nebulization Given 2/16/19 0207)   predniSONE tablet 40 mg (40 mg Oral Given 2/16/19 0207)       Diagnostic Studies  Results Reviewed     Procedure Component Value Units Date/Time    Rapid Influenza Screen with Reflex PCR [[id number]]  (Normal) Collected:  02/16/19 0250    Lab Status:  Final result Specimen:  Nasopharyngeal Swab Updated:  02/16/19 0330     Rapid Influenza A Ag Negative     Rapid Influenza B Ag Negative    INFLUENZA A/B AND RSV, PCR [[id number]] Collected:  02/16/19 0250    Lab Status:   In process Specimen:  Nasopharyngeal Swab Updated:  02/16/19 5805    Basic metabolic panel [[id number]]  (Abnormal) Collected:  02/16/19 0232    Lab Status:  Final result Specimen:  Blood from Arm, Right Updated:  02/16/19 0303     Sodium 141 mmol/L      Potassium 4 2 mmol/L      Chloride 107 mmol/L      CO2 28 mmol/L      ANION GAP 6 mmol/L      BUN 19 mg/dL      Creatinine 0 82 mg/dL      Glucose 160 mg/dL      Calcium 8 4 mg/dL      eGFR 83 ml/min/1 73sq m     Narrative:       National Kidney Disease Education Program recommendations are as follows:  GFR calculation is accurate only with a steady state creatinine  Chronic Kidney disease less than 60 ml/min/1 73 sq  meters  Kidney failure less than 15 ml/min/1 73 sq  meters  B-type natriuretic peptide [[id number]]  (Normal) Collected:  02/16/19 0232    Lab Status:  Final result Specimen:  Blood from Arm, Right Updated:  02/16/19 0303     NT-proBNP 23 pg/mL     Troponin I [[id number]]  (Normal) Collected:  02/16/19 0232    Lab Status:  Final result Specimen:  Blood from Arm, Right Updated:  02/16/19 0259     Troponin I <0 02 ng/mL     CBC and differential [[id number]]  (Abnormal) Collected:  02/16/19 0232    Lab Status:  Final result Specimen:  Blood from Arm, Right Updated:  02/16/19 0244     WBC 8 06 Thousand/uL      RBC 4 29 Million/uL      Hemoglobin 13 1 g/dL      Hematocrit 40 6 %      MCV 95 fL      MCH 30 5 pg      MCHC 32 3 g/dL      RDW 13 5 %      MPV 9 4 fL      Platelets 045 Thousands/uL      nRBC 0 /100 WBCs      Neutrophils Relative 41 %      Immat GRANS % 0 %      Lymphocytes Relative 31 %      Monocytes Relative 11 %      Eosinophils Relative 16 %      Basophils Relative 1 %      Neutrophils Absolute 3 26 Thousands/µL      Immature Grans Absolute 0 02 Thousand/uL      Lymphocytes Absolute 2 52 Thousands/µL      Monocytes Absolute 0 90 Thousand/µL      Eosinophils Absolute 1 30 Thousand/µL      Basophils Absolute 0 06 Thousands/µL                  XR chest 2 views   ED Interpretation by Divya Ayala MD (02/16 7908)   No acute disease            Procedures  Procedures      Phone Consults  ED Phone Contact    ED Course           Identification of Seniors at Risk      Most Recent Value   (ISAR) Identification of Seniors at Risk   Before the illness or injury that brought you to the Emergency, did you need someone to help you on a regular basis?   0 Filed at: 02/15/2019 2244   In the last 24 hours, have you needed more help than usual?  0 Filed at: 02/15/2019 2244   Have you been hospitalized for one or more nights during the past 6 months? 0 Filed at: 02/15/2019 2244   In general, do you see well? 1 Filed at: 02/15/2019 2244   In general, do you have serious problems with your memory? 0 Filed at: 02/15/2019 2244   Do you take more than three different medications every day?  0 Filed at: 02/15/2019 2244   ISAR Score  1 Filed at: 02/15/2019 2244                          MDM  Number of Diagnoses or Management Options  Asthma exacerbation:   Viral syndrome:   Diagnosis management comments: Cardiac workup unremarkable  No chest pain while in the ED  Chest x-ray unremarkable  Patient with significant symptomatic improvement after DuoNeb x2 and 1st dose of prednisone  Patient and his daughter were counseled regarding observation versus discharge with close outpatient follow-up  They chose to be discharged  Patient was given prescriptions for albuterol MDI refill, prednisone burst   He was instructed to follow up with his primary doctor on Monday and was given strict return precautions for worsening symptoms  Disposition  Final diagnoses:   Asthma exacerbation   Viral syndrome     Time reflects when diagnosis was documented in both MDM as applicable and the Disposition within this note     Time User Action Codes Description Comment    2/16/2019  3:54 AM Catalina Alvares Add [J45 901] Asthma exacerbation     2/16/2019  3:55 AM Catalina Alvares Add [B34 9] Viral syndrome       ED Disposition     ED Disposition Condition Date/Time Comment    Discharge Stable Sat Feb 16, 2019  3:58 AM Moira Needs discharge to home/self care              Follow-up Information     Follow up With Specialties Details Why Contact Info Additional Information    Sage Edward MD Internal Medicine Schedule an appointment as soon as possible for a visit in 2 days For re-evaluation and further treatment [address] [place] [facility] Emergency Medicine  As needed Aj [phone] [id number]  [facility], [address], [place], [place], [address]          Discharge Medication List as of 2/16/2019  4:01 AM      START taking these medications    Details   albuterol (PROVENTIL HFA,VENTOLIN HFA) 90 mcg/act inhaler Inhale 2 puffs every 4 (four) hours as needed for wheezing, Starting Sat 2/16/2019, Print      predniSONE 50 mg tablet Take 1 tablet (50 mg total) by mouth daily for 5 days, Starting Sat 2/16/2019, Until Thu 2/21/2019, Print         CONTINUE these medications which have NOT CHANGED    Details   atorvastatin (LIPITOR) 20 mg tablet Take 20 mg by mouth daily, Historical Med      glipiZIDE (GLUCOTROL XL) 5 mg 24 hr tablet Take 5 mg by mouth 2 (two) times a day, Historical Med      levothyroxine 100 mcg tablet Take 112 mcg by mouth daily, Historical Med      Linagliptin (TRADJENTA) 5 MG TABS Take 5 mg by mouth daily, Historical Med      metFORMIN (GLUCOPHAGE) 1000 MG tablet Take 1,000 mg by mouth 2 (two) times a day with meals, Historical Med      tamsulosin (FLOMAX) 0 4 mg Take 0 4 mg by mouth daily with dinner, Historical Med           No discharge procedures on file  ED Provider  Attending physically available and evaluated Jose Teague I managed the patient along with the ED Attending      Electronically Signed by         Chris Dickerson MD  02/16/19 9493

## 2019-12-11 ENCOUNTER — APPOINTMENT (EMERGENCY)
Dept: CT IMAGING | Facility: HOSPITAL | Age: 84
DRG: 690 | End: 2019-12-11
Payer: MEDICARE

## 2019-12-11 ENCOUNTER — HOSPITAL ENCOUNTER (INPATIENT)
Facility: HOSPITAL | Age: 84
LOS: 6 days | Discharge: NURSING, REHAB OR LONG-TERM CARE | DRG: 690 | End: 2019-12-18
Attending: EMERGENCY MEDICINE | Admitting: INTERNAL MEDICINE
Payer: MEDICARE

## 2019-12-11 ENCOUNTER — APPOINTMENT (EMERGENCY)
Dept: RADIOLOGY | Facility: HOSPITAL | Age: 84
DRG: 690 | End: 2019-12-11
Payer: MEDICARE

## 2019-12-11 DIAGNOSIS — D72.829 LEUKOCYTOSIS: ICD-10-CM

## 2019-12-11 DIAGNOSIS — R79.89 ABNORMAL TSH: ICD-10-CM
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
## 2019-12-11 DIAGNOSIS — E03.9 HYPOTHYROIDISM: Chronic | ICD-10-CM

## 2019-12-11 DIAGNOSIS — N39.0 UTI (URINARY TRACT INFECTION): ICD-10-CM

## 2019-12-11 DIAGNOSIS — R26.2 AMBULATORY DYSFUNCTION: ICD-10-CM

## 2019-12-11 LAB
ALBUMIN SERPL BCP-MCNC: 3.4 G/DL (ref 3.5–5)
ALP SERPL-CCNC: 61 U/L (ref 46–116)
ALT SERPL W P-5'-P-CCNC: 16 U/L (ref 12–78)
ANION GAP SERPL CALCULATED.3IONS-SCNC: 7 MMOL/L (ref 4–13)
APTT PPP: 30 SECONDS (ref 23–37)
AST SERPL W P-5'-P-CCNC: 14 U/L (ref 5–45)
BASOPHILS # BLD AUTO: 0.04 THOUSANDS/ΜL (ref 0–0.1)
BASOPHILS NFR BLD AUTO: 0 % (ref 0–1)
BILIRUB SERPL-MCNC: 0.72 MG/DL (ref 0.2–1)
BUN SERPL-MCNC: 14 MG/DL (ref 5–25)
CALCIUM SERPL-MCNC: 8.9 MG/DL (ref 8.3–10.1)
CHLORIDE SERPL-SCNC: 105 MMOL/L (ref 100–108)
CO2 SERPL-SCNC: 28 MMOL/L (ref 21–32)
CREAT SERPL-MCNC: 0.92 MG/DL (ref 0.6–1.3)
EOSINOPHIL # BLD AUTO: 0.02 THOUSAND/ΜL (ref 0–0.61)
EOSINOPHIL NFR BLD AUTO: 0 % (ref 0–6)
ERYTHROCYTE [DISTWIDTH] IN BLOOD BY AUTOMATED COUNT: 13.1 % (ref 11.6–15.1)
GFR SERPL CREATININE-BSD FRML MDRD: 77 ML/MIN/1.73SQ M
GLUCOSE SERPL-MCNC: 161 MG/DL (ref 65–140)
HCT VFR BLD AUTO: 38.6 % (ref 36.5–49.3)
HGB BLD-MCNC: 12.7 G/DL (ref 12–17)
IMM GRANULOCYTES # BLD AUTO: 0.1 THOUSAND/UL (ref 0–0.2)
IMM GRANULOCYTES NFR BLD AUTO: 1 % (ref 0–2)
INR PPP: 1.2 (ref 0.84–1.19)
LYMPHOCYTES # BLD AUTO: 0.94 THOUSANDS/ΜL (ref 0.6–4.47)
LYMPHOCYTES NFR BLD AUTO: 5 % (ref 14–44)
MCH RBC QN AUTO: 30.5 PG (ref 26.8–34.3)
MCHC RBC AUTO-ENTMCNC: 32.9 G/DL (ref 31.4–37.4)
MCV RBC AUTO: 93 FL (ref 82–98)
MONOCYTES # BLD AUTO: 1.14 THOUSAND/ΜL (ref 0.17–1.22)
MONOCYTES NFR BLD AUTO: 7 % (ref 4–12)
NEUTROPHILS # BLD AUTO: 15.13 THOUSANDS/ΜL (ref 1.85–7.62)
NEUTS SEG NFR BLD AUTO: 87 % (ref 43–75)
NRBC BLD AUTO-RTO: 0 /100 WBCS
NT-PROBNP SERPL-MCNC: 129 PG/ML
PLATELET # BLD AUTO: 228 THOUSANDS/UL (ref 149–390)
PMV BLD AUTO: 9.5 FL (ref 8.9–12.7)
POTASSIUM SERPL-SCNC: 3.3 MMOL/L (ref 3.5–5.3)
PROT SERPL-MCNC: 6.9 G/DL (ref 6.4–8.2)
PROTHROMBIN TIME: 14.6 SECONDS (ref 11.6–14.5)
RBC # BLD AUTO: 4.17 MILLION/UL (ref 3.88–5.62)
SODIUM SERPL-SCNC: 140 MMOL/L (ref 136–145)
TROPONIN I SERPL-MCNC: 0.03 NG/ML
TSH SERPL DL<=0.05 MIU/L-ACNC: 0.24 UIU/ML (ref 0.36–3.74)
WBC # BLD AUTO: 17.37 THOUSAND/UL (ref 4.31–10.16)

## 2019-12-11 PROCEDURE — 85025 COMPLETE CBC W/AUTO DIFF WBC: CPT | Performed by: PHYSICIAN ASSISTANT

## 2019-12-11 PROCEDURE — 85610 PROTHROMBIN TIME: CPT | Performed by: PHYSICIAN ASSISTANT

## 2019-12-11 PROCEDURE — 71260 CT THORAX DX C+: CPT

## 2019-12-11 PROCEDURE — 73564 X-RAY EXAM KNEE 4 OR MORE: CPT

## 2019-12-11 PROCEDURE — 93005 ELECTROCARDIOGRAM TRACING: CPT

## 2019-12-11 PROCEDURE — 99285 EMERGENCY DEPT VISIT HI MDM: CPT

## 2019-12-11 PROCEDURE — 72125 CT NECK SPINE W/O DYE: CPT

## 2019-12-11 PROCEDURE — 84443 ASSAY THYROID STIM HORMONE: CPT | Performed by: PHYSICIAN ASSISTANT

## 2019-12-11 PROCEDURE — 84484 ASSAY OF TROPONIN QUANT: CPT | Performed by: PHYSICIAN ASSISTANT

## 2019-12-11 PROCEDURE — 74177 CT ABD & PELVIS W/CONTRAST: CPT

## 2019-12-11 PROCEDURE — 36415 COLL VENOUS BLD VENIPUNCTURE: CPT | Performed by: PHYSICIAN ASSISTANT

## 2019-12-11 PROCEDURE — 99284 EMERGENCY DEPT VISIT MOD MDM: CPT | Performed by: PHYSICIAN ASSISTANT

## 2019-12-11 PROCEDURE — 83735 ASSAY OF MAGNESIUM: CPT | Performed by: PHYSICIAN ASSISTANT

## 2019-12-11 PROCEDURE — 73630 X-RAY EXAM OF FOOT: CPT

## 2019-12-11 PROCEDURE — 85730 THROMBOPLASTIN TIME PARTIAL: CPT | Performed by: PHYSICIAN ASSISTANT

## 2019-12-11 PROCEDURE — 84439 ASSAY OF FREE THYROXINE: CPT | Performed by: PHYSICIAN ASSISTANT

## 2019-12-11 PROCEDURE — 70450 CT HEAD/BRAIN W/O DYE: CPT

## 2019-12-11 PROCEDURE — 83880 ASSAY OF NATRIURETIC PEPTIDE: CPT | Performed by: PHYSICIAN ASSISTANT

## 2019-12-11 PROCEDURE — 80053 COMPREHEN METABOLIC PANEL: CPT | Performed by: PHYSICIAN ASSISTANT

## 2019-12-11 RX ORDER — DOXYCYCLINE HYCLATE 100 MG/1
100 CAPSULE ORAL ONCE
Status: DISCONTINUED | OUTPATIENT
Start: 2019-12-11 | End: 2019-12-12

## 2019-12-11 RX ADMIN — IOHEXOL 100 ML: 350 INJECTION, SOLUTION INTRAVENOUS at 21:29

## 2019-12-12 PROBLEM — E78.5 HYPERLIPIDEMIA: Chronic | Status: ACTIVE | Noted: 2019-12-12

## 2019-12-12 PROBLEM — A41.9 SEPSIS WITHOUT ACUTE ORGAN DYSFUNCTION (HCC): Status: ACTIVE | Noted: 2019-12-12
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
## 2019-12-12 PROBLEM — E11.9 TYPE 2 DIABETES MELLITUS, WITHOUT LONG-TERM CURRENT USE OF INSULIN (HCC): Chronic | Status: ACTIVE | Noted: 2019-12-12

## 2019-12-12 PROBLEM — E03.9 HYPOTHYROIDISM: Chronic | Status: ACTIVE | Noted: 2019-12-12

## 2019-12-12 PROBLEM — R65.10 SIRS (SYSTEMIC INFLAMMATORY RESPONSE SYNDROME) (HCC): Status: ACTIVE | Noted: 2019-12-12

## 2019-12-12 PROBLEM — N39.0 UTI (URINARY TRACT INFECTION): Status: ACTIVE | Noted: 2019-12-12

## 2019-12-12 PROBLEM — R26.2 AMBULATORY DYSFUNCTION: Status: ACTIVE | Noted: 2019-12-12

## 2019-12-12 PROBLEM — F03.90 DEMENTIA (HCC): Chronic | Status: ACTIVE | Noted: 2019-12-12

## 2019-12-12 PROBLEM — E87.6 HYPOKALEMIA: Status: ACTIVE | Noted: 2019-12-12

## 2019-12-12 PROBLEM — Z86.73 HISTORY OF CVA (CEREBROVASCULAR ACCIDENT): Chronic | Status: ACTIVE | Noted: 2019-12-12
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
## 2019-12-12 LAB
ANION GAP SERPL CALCULATED.3IONS-SCNC: 8 MMOL/L (ref 4–13)
BACTERIA UR QL AUTO: ABNORMAL /HPF
BASOPHILS # BLD AUTO: 0.04 THOUSANDS/ΜL (ref 0–0.1)
BASOPHILS NFR BLD AUTO: 0 % (ref 0–1)
BILIRUB UR QL STRIP: NEGATIVE
BUN SERPL-MCNC: 10 MG/DL (ref 5–25)
CALCIUM SERPL-MCNC: 8.5 MG/DL (ref 8.3–10.1)
CHLORIDE SERPL-SCNC: 105 MMOL/L (ref 100–108)
CLARITY UR: ABNORMAL
CO2 SERPL-SCNC: 26 MMOL/L (ref 21–32)
COLOR UR: YELLOW
CREAT SERPL-MCNC: 0.81 MG/DL (ref 0.6–1.3)
EOSINOPHIL # BLD AUTO: 0.06 THOUSAND/ΜL (ref 0–0.61)
EOSINOPHIL NFR BLD AUTO: 0 % (ref 0–6)
ERYTHROCYTE [DISTWIDTH] IN BLOOD BY AUTOMATED COUNT: 13.2 % (ref 11.6–15.1)
EST. AVERAGE GLUCOSE BLD GHB EST-MCNC: 140 MG/DL
GFR SERPL CREATININE-BSD FRML MDRD: 83 ML/MIN/1.73SQ M
GLUCOSE SERPL-MCNC: 121 MG/DL (ref 65–140)
GLUCOSE SERPL-MCNC: 135 MG/DL (ref 65–140)
GLUCOSE SERPL-MCNC: 142 MG/DL (ref 65–140)
GLUCOSE SERPL-MCNC: 164 MG/DL (ref 65–140)
GLUCOSE SERPL-MCNC: 199 MG/DL (ref 65–140)
GLUCOSE SERPL-MCNC: 229 MG/DL (ref 65–140)
GLUCOSE UR STRIP-MCNC: ABNORMAL MG/DL
HBA1C MFR BLD: 6.5 % (ref 4.2–6.3)
HCT VFR BLD AUTO: 35.5 % (ref 36.5–49.3)
HGB BLD-MCNC: 11.9 G/DL (ref 12–17)
HGB UR QL STRIP.AUTO: ABNORMAL
IMM GRANULOCYTES # BLD AUTO: 0.18 THOUSAND/UL (ref 0–0.2)
IMM GRANULOCYTES NFR BLD AUTO: 1 % (ref 0–2)
KETONES UR STRIP-MCNC: ABNORMAL MG/DL
LACTATE SERPL-SCNC: 1 MMOL/L (ref 0.5–2)
LEUKOCYTE ESTERASE UR QL STRIP: NEGATIVE
LYMPHOCYTES # BLD AUTO: 1.39 THOUSANDS/ΜL (ref 0.6–4.47)
LYMPHOCYTES NFR BLD AUTO: 8 % (ref 14–44)
MAGNESIUM SERPL-MCNC: 1.5 MG/DL (ref 1.6–2.6)
MCH RBC QN AUTO: 31.2 PG (ref 26.8–34.3)
MCHC RBC AUTO-ENTMCNC: 33.5 G/DL (ref 31.4–37.4)
MCV RBC AUTO: 93 FL (ref 82–98)
MONOCYTES # BLD AUTO: 1.38 THOUSAND/ΜL (ref 0.17–1.22)
MONOCYTES NFR BLD AUTO: 8 % (ref 4–12)
NEUTROPHILS # BLD AUTO: 15.16 THOUSANDS/ΜL (ref 1.85–7.62)
NEUTS SEG NFR BLD AUTO: 83 % (ref 43–75)
NITRITE UR QL STRIP: POSITIVE
NON-SQ EPI CELLS URNS QL MICRO: ABNORMAL /HPF
NRBC BLD AUTO-RTO: 0 /100 WBCS
PH UR STRIP.AUTO: 5.5 [PH]
PLATELET # BLD AUTO: 201 THOUSANDS/UL (ref 149–390)
PMV BLD AUTO: 9.3 FL (ref 8.9–12.7)
POTASSIUM SERPL-SCNC: 3.2 MMOL/L (ref 3.5–5.3)
PROT UR STRIP-MCNC: ABNORMAL MG/DL
RBC # BLD AUTO: 3.82 MILLION/UL (ref 3.88–5.62)
RBC #/AREA URNS AUTO: ABNORMAL /HPF
SODIUM SERPL-SCNC: 139 MMOL/L (ref 136–145)
SP GR UR STRIP.AUTO: 1.01 (ref 1–1.03)
T4 FREE SERPL-MCNC: 1.49 NG/DL (ref 0.76–1.46)
UROBILINOGEN UR QL STRIP.AUTO: 1 E.U./DL
WBC # BLD AUTO: 18.21 THOUSAND/UL (ref 4.31–10.16)
WBC #/AREA URNS AUTO: ABNORMAL /HPF

## 2019-12-12 PROCEDURE — 82948 REAGENT STRIP/BLOOD GLUCOSE: CPT

## 2019-12-12 PROCEDURE — 97167 OT EVAL HIGH COMPLEX 60 MIN: CPT

## 2019-12-12 PROCEDURE — 87186 SC STD MICRODIL/AGAR DIL: CPT | Performed by: PHYSICIAN ASSISTANT

## 2019-12-12 PROCEDURE — 99223 1ST HOSP IP/OBS HIGH 75: CPT | Performed by: PHYSICIAN ASSISTANT

## 2019-12-12 PROCEDURE — 87086 URINE CULTURE/COLONY COUNT: CPT | Performed by: PHYSICIAN ASSISTANT

## 2019-12-12 PROCEDURE — G8987 SELF CARE CURRENT STATUS: HCPCS

## 2019-12-12 PROCEDURE — 80048 BASIC METABOLIC PNL TOTAL CA: CPT | Performed by: PHYSICIAN ASSISTANT

## 2019-12-12 PROCEDURE — 87077 CULTURE AEROBIC IDENTIFY: CPT | Performed by: PHYSICIAN ASSISTANT

## 2019-12-12 PROCEDURE — G0008 ADMIN INFLUENZA VIRUS VAC: HCPCS | Performed by: PHYSICIAN ASSISTANT

## 2019-12-12 PROCEDURE — 97535 SELF CARE MNGMENT TRAINING: CPT

## 2019-12-12 PROCEDURE — 83605 ASSAY OF LACTIC ACID: CPT | Performed by: PHYSICIAN ASSISTANT

## 2019-12-12 PROCEDURE — G8988 SELF CARE GOAL STATUS: HCPCS

## 2019-12-12 PROCEDURE — 97163 PT EVAL HIGH COMPLEX 45 MIN: CPT

## 2019-12-12 PROCEDURE — G8979 MOBILITY GOAL STATUS: HCPCS

## 2019-12-12 PROCEDURE — 81001 URINALYSIS AUTO W/SCOPE: CPT | Performed by: PHYSICIAN ASSISTANT

## 2019-12-12 PROCEDURE — 83036 HEMOGLOBIN GLYCOSYLATED A1C: CPT | Performed by: PHYSICIAN ASSISTANT

## 2019-12-12 PROCEDURE — 99233 SBSQ HOSP IP/OBS HIGH 50: CPT | Performed by: INTERNAL MEDICINE

## 2019-12-12 PROCEDURE — 85025 COMPLETE CBC W/AUTO DIFF WBC: CPT | Performed by: PHYSICIAN ASSISTANT

## 2019-12-12 PROCEDURE — 90662 IIV NO PRSV INCREASED AG IM: CPT | Performed by: PHYSICIAN ASSISTANT

## 2019-12-12 PROCEDURE — 87040 BLOOD CULTURE FOR BACTERIA: CPT | Performed by: PHYSICIAN ASSISTANT

## 2019-12-12 PROCEDURE — 36415 COLL VENOUS BLD VENIPUNCTURE: CPT | Performed by: PHYSICIAN ASSISTANT

## 2019-12-12 PROCEDURE — G8978 MOBILITY CURRENT STATUS: HCPCS

## 2019-12-12 RX ORDER — ALBUTEROL SULFATE 90 UG/1
2 AEROSOL, METERED RESPIRATORY (INHALATION) EVERY 4 HOURS PRN
Status: DISCONTINUED | OUTPATIENT
Start: 2019-12-12 | End: 2019-12-18 | Stop reason: HOSPADM

## 2019-12-12 RX ORDER — ACETAMINOPHEN 325 MG/1
650 TABLET ORAL EVERY 6 HOURS PRN
Status: DISCONTINUED | OUTPATIENT
Start: 2019-12-12 | End: 2019-12-18 | Stop reason: HOSPADM

## 2019-12-12 RX ORDER — ATORVASTATIN CALCIUM 20 MG/1
20 TABLET, FILM COATED ORAL
Status: DISCONTINUED | OUTPATIENT
Start: 2019-12-12 | End: 2019-12-15

## 2019-12-12 RX ORDER — LEVOTHYROXINE SODIUM 112 UG/1
112 TABLET ORAL
Status: DISCONTINUED | OUTPATIENT
Start: 2019-12-12 | End: 2019-12-17

## 2019-12-12 RX ORDER — SODIUM CHLORIDE 9 MG/ML
100 INJECTION, SOLUTION INTRAVENOUS CONTINUOUS
Status: DISPENSED | OUTPATIENT
Start: 2019-12-12 | End: 2019-12-12

## 2019-12-12 RX ORDER — ONDANSETRON 2 MG/ML
4 INJECTION INTRAMUSCULAR; INTRAVENOUS EVERY 6 HOURS PRN
Status: DISCONTINUED | OUTPATIENT
Start: 2019-12-12 | End: 2019-12-18 | Stop reason: HOSPADM

## 2019-12-12 RX ORDER — MAGNESIUM SULFATE 1 G/100ML
1 INJECTION INTRAVENOUS ONCE
Status: COMPLETED | OUTPATIENT
Start: 2019-12-12 | End: 2019-12-12

## 2019-12-12 RX ORDER — POTASSIUM CHLORIDE 20 MEQ/1
40 TABLET, EXTENDED RELEASE ORAL ONCE
Status: COMPLETED | OUTPATIENT
Start: 2019-12-12 | End: 2019-12-12

## 2019-12-12 RX ORDER — TAMSULOSIN HYDROCHLORIDE 0.4 MG/1
0.4 CAPSULE ORAL
Status: DISCONTINUED | OUTPATIENT
Start: 2019-12-12 | End: 2019-12-18 | Stop reason: HOSPADM

## 2019-12-12 RX ORDER — POTASSIUM CHLORIDE 20 MEQ/1
20 TABLET, EXTENDED RELEASE ORAL ONCE
Status: COMPLETED | OUTPATIENT
Start: 2019-12-12 | End: 2019-12-12

## 2019-12-12 RX ADMIN — POTASSIUM CHLORIDE 20 MEQ: 1500 TABLET, EXTENDED RELEASE ORAL at 01:26

## 2019-12-12 RX ADMIN — INSULIN LISPRO 1 UNITS: 100 INJECTION, SOLUTION INTRAVENOUS; SUBCUTANEOUS at 02:40

## 2019-12-12 RX ADMIN — TAMSULOSIN HYDROCHLORIDE 0.4 MG: 0.4 CAPSULE ORAL at 16:05

## 2019-12-12 RX ADMIN — LEVOTHYROXINE SODIUM 112 MCG: 112 TABLET ORAL at 05:07

## 2019-12-12 RX ADMIN — SODIUM CHLORIDE 100 ML/HR: 0.9 INJECTION, SOLUTION INTRAVENOUS at 01:28

## 2019-12-12 RX ADMIN — CEFTRIAXONE 1000 MG: 1 INJECTION, POWDER, FOR SOLUTION INTRAMUSCULAR; INTRAVENOUS at 02:40

## 2019-12-12 RX ADMIN — INSULIN LISPRO 2 UNITS: 100 INJECTION, SOLUTION INTRAVENOUS; SUBCUTANEOUS at 22:23

## 2019-12-12 RX ADMIN — POTASSIUM CHLORIDE 40 MEQ: 1500 TABLET, EXTENDED RELEASE ORAL at 09:15

## 2019-12-12 RX ADMIN — MAGNESIUM SULFATE HEPTAHYDRATE 1 G: 1 INJECTION, SOLUTION INTRAVENOUS at 01:27

## 2019-12-12 RX ADMIN — ATORVASTATIN CALCIUM 20 MG: 20 TABLET, FILM COATED ORAL at 16:05

## 2019-12-12 RX ADMIN — INSULIN LISPRO 2 UNITS: 100 INJECTION, SOLUTION INTRAVENOUS; SUBCUTANEOUS at 16:04

## 2019-12-12 RX ADMIN — INFLUENZA A VIRUS A/MICHIGAN/45/2015 X-275 (H1N1) ANTIGEN (FORMALDEHYDE INACTIVATED), INFLUENZA A VIRUS A/SINGAPORE/INFIMH-16-0019/2016 IVR-186 (H3N2) ANTIGEN (FORMALDEHYDE INACTIVATED), AND INFLUENZA B VIRUS B/MARYLAND/15/2016 BX-69A (A B/COLORADO/6/2017-LIKE VIRUS) ANTIGEN (FORMALDEHYDE INACTIVATED) 0.5 ML: 60; 60; 60 INJECTION, SUSPENSION INTRAMUSCULAR at 01:27

## 2019-12-13 PROBLEM — E83.42 HYPOMAGNESEMIA: Status: ACTIVE | Noted: 2019-12-13

## 2019-12-13 LAB
ANION GAP SERPL CALCULATED.3IONS-SCNC: 11 MMOL/L (ref 4–13)
ATRIAL RATE: 104 BPM
BASOPHILS # BLD AUTO: 0.06 THOUSANDS/ΜL (ref 0–0.1)
BASOPHILS NFR BLD AUTO: 0 % (ref 0–1)
BUN SERPL-MCNC: 11 MG/DL (ref 5–25)
CALCIUM SERPL-MCNC: 9.1 MG/DL (ref 8.3–10.1)
CHLORIDE SERPL-SCNC: 105 MMOL/L (ref 100–108)
CO2 SERPL-SCNC: 24 MMOL/L (ref 21–32)
CREAT SERPL-MCNC: 0.92 MG/DL (ref 0.6–1.3)
EOSINOPHIL # BLD AUTO: 0.22 THOUSAND/ΜL (ref 0–0.61)
EOSINOPHIL NFR BLD AUTO: 2 % (ref 0–6)
ERYTHROCYTE [DISTWIDTH] IN BLOOD BY AUTOMATED COUNT: 13.1 % (ref 11.6–15.1)
GFR SERPL CREATININE-BSD FRML MDRD: 77 ML/MIN/1.73SQ M
GLUCOSE SERPL-MCNC: 146 MG/DL (ref 65–140)
GLUCOSE SERPL-MCNC: 156 MG/DL (ref 65–140)
GLUCOSE SERPL-MCNC: 181 MG/DL (ref 65–140)
GLUCOSE SERPL-MCNC: 226 MG/DL (ref 65–140)
GLUCOSE SERPL-MCNC: 245 MG/DL (ref 65–140)
HCT VFR BLD AUTO: 38.1 % (ref 36.5–49.3)
HGB BLD-MCNC: 12.8 G/DL (ref 12–17)
IMM GRANULOCYTES # BLD AUTO: 0.12 THOUSAND/UL (ref 0–0.2)
IMM GRANULOCYTES NFR BLD AUTO: 1 % (ref 0–2)
LYMPHOCYTES # BLD AUTO: 1.72 THOUSANDS/ΜL (ref 0.6–4.47)
LYMPHOCYTES NFR BLD AUTO: 11 % (ref 14–44)
MCH RBC QN AUTO: 31.3 PG (ref 26.8–34.3)
MCHC RBC AUTO-ENTMCNC: 33.6 G/DL (ref 31.4–37.4)
MCV RBC AUTO: 93 FL (ref 82–98)
MONOCYTES # BLD AUTO: 1.11 THOUSAND/ΜL (ref 0.17–1.22)
MONOCYTES NFR BLD AUTO: 7 % (ref 4–12)
NEUTROPHILS # BLD AUTO: 11.81 THOUSANDS/ΜL (ref 1.85–7.62)
NEUTS SEG NFR BLD AUTO: 79 % (ref 43–75)
NRBC BLD AUTO-RTO: 0 /100 WBCS
P AXIS: 74 DEGREES
PLATELET # BLD AUTO: 197 THOUSANDS/UL (ref 149–390)
PMV BLD AUTO: 9.3 FL (ref 8.9–12.7)
POTASSIUM SERPL-SCNC: 3.5 MMOL/L (ref 3.5–5.3)
PR INTERVAL: 190 MS
QRS AXIS: 79 DEGREES
QRSD INTERVAL: 92 MS
QT INTERVAL: 336 MS
QTC INTERVAL: 441 MS
RBC # BLD AUTO: 4.09 MILLION/UL (ref 3.88–5.62)
SODIUM SERPL-SCNC: 140 MMOL/L (ref 136–145)
T WAVE AXIS: 57 DEGREES
VENTRICULAR RATE: 104 BPM
WBC # BLD AUTO: 15.04 THOUSAND/UL (ref 4.31–10.16)

## 2019-12-13 PROCEDURE — 82948 REAGENT STRIP/BLOOD GLUCOSE: CPT

## 2019-12-13 PROCEDURE — 85025 COMPLETE CBC W/AUTO DIFF WBC: CPT | Performed by: INTERNAL MEDICINE

## 2019-12-13 PROCEDURE — 80048 BASIC METABOLIC PNL TOTAL CA: CPT | Performed by: INTERNAL MEDICINE

## 2019-12-13 PROCEDURE — 97116 GAIT TRAINING THERAPY: CPT

## 2019-12-13 PROCEDURE — 99232 SBSQ HOSP IP/OBS MODERATE 35: CPT | Performed by: INTERNAL MEDICINE

## 2019-12-13 PROCEDURE — 93010 ELECTROCARDIOGRAM REPORT: CPT | Performed by: INTERNAL MEDICINE

## 2019-12-13 PROCEDURE — 97110 THERAPEUTIC EXERCISES: CPT

## 2019-12-13 RX ORDER — MAGNESIUM SULFATE 1 G/100ML
1 INJECTION INTRAVENOUS ONCE
Status: COMPLETED | OUTPATIENT
Start: 2019-12-13 | End: 2019-12-13

## 2019-12-13 RX ADMIN — INSULIN LISPRO 1 UNITS: 100 INJECTION, SOLUTION INTRAVENOUS; SUBCUTANEOUS at 08:49

## 2019-12-13 RX ADMIN — TAMSULOSIN HYDROCHLORIDE 0.4 MG: 0.4 CAPSULE ORAL at 17:26

## 2019-12-13 RX ADMIN — INSULIN LISPRO 3 UNITS: 100 INJECTION, SOLUTION INTRAVENOUS; SUBCUTANEOUS at 21:45

## 2019-12-13 RX ADMIN — INSULIN LISPRO 1 UNITS: 100 INJECTION, SOLUTION INTRAVENOUS; SUBCUTANEOUS at 17:30

## 2019-12-13 RX ADMIN — INSULIN LISPRO 2 UNITS: 100 INJECTION, SOLUTION INTRAVENOUS; SUBCUTANEOUS at 12:47

## 2019-12-13 RX ADMIN — CEFTRIAXONE 1000 MG: 1 INJECTION, POWDER, FOR SOLUTION INTRAMUSCULAR; INTRAVENOUS at 02:07

## 2019-12-13 RX ADMIN — ATORVASTATIN CALCIUM 20 MG: 20 TABLET, FILM COATED ORAL at 17:26

## 2019-12-13 RX ADMIN — LEVOTHYROXINE SODIUM 112 MCG: 112 TABLET ORAL at 05:37

## 2019-12-13 RX ADMIN — ENOXAPARIN SODIUM 40 MG: 40 INJECTION SUBCUTANEOUS at 08:48

## 2019-12-13 RX ADMIN — MAGNESIUM SULFATE HEPTAHYDRATE 1 G: 1 INJECTION, SOLUTION INTRAVENOUS at 14:47

## 2019-12-13 RX ADMIN — ACETAMINOPHEN 650 MG: 325 TABLET, FILM COATED ORAL at 18:00

## 2019-12-14 LAB
ANION GAP SERPL CALCULATED.3IONS-SCNC: 7 MMOL/L (ref 4–13)
BACTERIA UR CULT: ABNORMAL
BUN SERPL-MCNC: 11 MG/DL (ref 5–25)
CALCIUM SERPL-MCNC: 8.1 MG/DL (ref 8.3–10.1)
CHLORIDE SERPL-SCNC: 105 MMOL/L (ref 100–108)
CO2 SERPL-SCNC: 26 MMOL/L (ref 21–32)
CREAT SERPL-MCNC: 0.79 MG/DL (ref 0.6–1.3)
ERYTHROCYTE [DISTWIDTH] IN BLOOD BY AUTOMATED COUNT: 12.8 % (ref 11.6–15.1)
GFR SERPL CREATININE-BSD FRML MDRD: 84 ML/MIN/1.73SQ M
GLUCOSE SERPL-MCNC: 136 MG/DL (ref 65–140)
GLUCOSE SERPL-MCNC: 143 MG/DL (ref 65–140)
GLUCOSE SERPL-MCNC: 143 MG/DL (ref 65–140)
GLUCOSE SERPL-MCNC: 195 MG/DL (ref 65–140)
GLUCOSE SERPL-MCNC: 205 MG/DL (ref 65–140)
HCT VFR BLD AUTO: 34.8 % (ref 36.5–49.3)
HGB BLD-MCNC: 11.7 G/DL (ref 12–17)
MAGNESIUM SERPL-MCNC: 2.1 MG/DL (ref 1.6–2.6)
MCH RBC QN AUTO: 30.8 PG (ref 26.8–34.3)
MCHC RBC AUTO-ENTMCNC: 33.6 G/DL (ref 31.4–37.4)
MCV RBC AUTO: 92 FL (ref 82–98)
PLATELET # BLD AUTO: 194 THOUSANDS/UL (ref 149–390)
PMV BLD AUTO: 9.7 FL (ref 8.9–12.7)
POTASSIUM SERPL-SCNC: 3.4 MMOL/L (ref 3.5–5.3)
RBC # BLD AUTO: 3.8 MILLION/UL (ref 3.88–5.62)
SODIUM SERPL-SCNC: 138 MMOL/L (ref 136–145)
WBC # BLD AUTO: 6.68 THOUSAND/UL (ref 4.31–10.16)

## 2019-12-14 PROCEDURE — 99233 SBSQ HOSP IP/OBS HIGH 50: CPT | Performed by: INTERNAL MEDICINE

## 2019-12-14 PROCEDURE — 85027 COMPLETE CBC AUTOMATED: CPT | Performed by: INTERNAL MEDICINE

## 2019-12-14 PROCEDURE — 80048 BASIC METABOLIC PNL TOTAL CA: CPT | Performed by: INTERNAL MEDICINE

## 2019-12-14 PROCEDURE — 83735 ASSAY OF MAGNESIUM: CPT | Performed by: INTERNAL MEDICINE

## 2019-12-14 PROCEDURE — 82948 REAGENT STRIP/BLOOD GLUCOSE: CPT

## 2019-12-14 RX ADMIN — ENOXAPARIN SODIUM 40 MG: 40 INJECTION SUBCUTANEOUS at 08:19

## 2019-12-14 RX ADMIN — ATORVASTATIN CALCIUM 20 MG: 20 TABLET, FILM COATED ORAL at 18:25

## 2019-12-14 RX ADMIN — INSULIN LISPRO 2 UNITS: 100 INJECTION, SOLUTION INTRAVENOUS; SUBCUTANEOUS at 12:17

## 2019-12-14 RX ADMIN — LEVOTHYROXINE SODIUM 112 MCG: 112 TABLET ORAL at 04:22

## 2019-12-14 RX ADMIN — ACETAMINOPHEN 650 MG: 325 TABLET, FILM COATED ORAL at 01:11

## 2019-12-14 RX ADMIN — INSULIN LISPRO 2 UNITS: 100 INJECTION, SOLUTION INTRAVENOUS; SUBCUTANEOUS at 22:19

## 2019-12-14 RX ADMIN — TAMSULOSIN HYDROCHLORIDE 0.4 MG: 0.4 CAPSULE ORAL at 18:25

## 2019-12-14 RX ADMIN — CEFTRIAXONE 1000 MG: 1 INJECTION, POWDER, FOR SOLUTION INTRAMUSCULAR; INTRAVENOUS at 01:10

## 2019-12-15 LAB
ANION GAP SERPL CALCULATED.3IONS-SCNC: 7 MMOL/L (ref 4–13)
BUN SERPL-MCNC: 9 MG/DL (ref 5–25)
CALCIUM SERPL-MCNC: 8.6 MG/DL (ref 8.3–10.1)
CHLORIDE SERPL-SCNC: 103 MMOL/L (ref 100–108)
CO2 SERPL-SCNC: 27 MMOL/L (ref 21–32)
CREAT SERPL-MCNC: 0.8 MG/DL (ref 0.6–1.3)
ERYTHROCYTE [DISTWIDTH] IN BLOOD BY AUTOMATED COUNT: 12.7 % (ref 11.6–15.1)
GFR SERPL CREATININE-BSD FRML MDRD: 83 ML/MIN/1.73SQ M
GLUCOSE SERPL-MCNC: 130 MG/DL (ref 65–140)
GLUCOSE SERPL-MCNC: 133 MG/DL (ref 65–140)
GLUCOSE SERPL-MCNC: 147 MG/DL (ref 65–140)
GLUCOSE SERPL-MCNC: 172 MG/DL (ref 65–140)
GLUCOSE SERPL-MCNC: 217 MG/DL (ref 65–140)
HCT VFR BLD AUTO: 35.3 % (ref 36.5–49.3)
HGB BLD-MCNC: 12 G/DL (ref 12–17)
MCH RBC QN AUTO: 30.8 PG (ref 26.8–34.3)
MCHC RBC AUTO-ENTMCNC: 34 G/DL (ref 31.4–37.4)
MCV RBC AUTO: 91 FL (ref 82–98)
PLATELET # BLD AUTO: 225 THOUSANDS/UL (ref 149–390)
PMV BLD AUTO: 9.2 FL (ref 8.9–12.7)
POTASSIUM SERPL-SCNC: 3.8 MMOL/L (ref 3.5–5.3)
RBC # BLD AUTO: 3.9 MILLION/UL (ref 3.88–5.62)
SODIUM SERPL-SCNC: 137 MMOL/L (ref 136–145)
WBC # BLD AUTO: 7.01 THOUSAND/UL (ref 4.31–10.16)

## 2019-12-15 PROCEDURE — 82948 REAGENT STRIP/BLOOD GLUCOSE: CPT

## 2019-12-15 PROCEDURE — 99233 SBSQ HOSP IP/OBS HIGH 50: CPT | Performed by: INTERNAL MEDICINE

## 2019-12-15 PROCEDURE — 80048 BASIC METABOLIC PNL TOTAL CA: CPT | Performed by: INTERNAL MEDICINE

## 2019-12-15 PROCEDURE — 85027 COMPLETE CBC AUTOMATED: CPT | Performed by: INTERNAL MEDICINE

## 2019-12-15 RX ORDER — CIPROFLOXACIN 500 MG/1
500 TABLET, FILM COATED ORAL ONCE
Status: COMPLETED | OUTPATIENT
Start: 2019-12-15 | End: 2019-12-15

## 2019-12-15 RX ADMIN — ENOXAPARIN SODIUM 40 MG: 40 INJECTION SUBCUTANEOUS at 09:45

## 2019-12-15 RX ADMIN — CEFTRIAXONE 1000 MG: 1 INJECTION, POWDER, FOR SOLUTION INTRAMUSCULAR; INTRAVENOUS at 02:22

## 2019-12-15 RX ADMIN — LEVOTHYROXINE SODIUM 112 MCG: 112 TABLET ORAL at 05:31

## 2019-12-15 RX ADMIN — TAMSULOSIN HYDROCHLORIDE 0.4 MG: 0.4 CAPSULE ORAL at 17:33

## 2019-12-15 RX ADMIN — INSULIN LISPRO 2 UNITS: 100 INJECTION, SOLUTION INTRAVENOUS; SUBCUTANEOUS at 17:34

## 2019-12-15 RX ADMIN — CIPROFLOXACIN HYDROCHLORIDE 500 MG: 500 TABLET, FILM COATED ORAL at 11:24

## 2019-12-15 RX ADMIN — INSULIN LISPRO 1 UNITS: 100 INJECTION, SOLUTION INTRAVENOUS; SUBCUTANEOUS at 23:00

## 2019-12-16 LAB
GLUCOSE SERPL-MCNC: 134 MG/DL (ref 65–140)
GLUCOSE SERPL-MCNC: 145 MG/DL (ref 65–140)
GLUCOSE SERPL-MCNC: 167 MG/DL (ref 65–140)
GLUCOSE SERPL-MCNC: 222 MG/DL (ref 65–140)

## 2019-12-16 PROCEDURE — 97116 GAIT TRAINING THERAPY: CPT

## 2019-12-16 PROCEDURE — 99233 SBSQ HOSP IP/OBS HIGH 50: CPT | Performed by: INTERNAL MEDICINE

## 2019-12-16 PROCEDURE — 97535 SELF CARE MNGMENT TRAINING: CPT

## 2019-12-16 PROCEDURE — 82948 REAGENT STRIP/BLOOD GLUCOSE: CPT

## 2019-12-16 RX ADMIN — INSULIN LISPRO 2 UNITS: 100 INJECTION, SOLUTION INTRAVENOUS; SUBCUTANEOUS at 16:36

## 2019-12-16 RX ADMIN — ENOXAPARIN SODIUM 40 MG: 40 INJECTION SUBCUTANEOUS at 09:25

## 2019-12-16 RX ADMIN — LEVOTHYROXINE SODIUM 112 MCG: 112 TABLET ORAL at 07:40

## 2019-12-16 RX ADMIN — TAMSULOSIN HYDROCHLORIDE 0.4 MG: 0.4 CAPSULE ORAL at 16:37

## 2019-12-16 RX ADMIN — INSULIN LISPRO 1 UNITS: 100 INJECTION, SOLUTION INTRAVENOUS; SUBCUTANEOUS at 12:39

## 2019-12-17 PROBLEM — E83.42 HYPOMAGNESEMIA: Status: RESOLVED | Noted: 2019-12-13 | Resolved: 2019-12-17

## 2019-12-17 PROBLEM — E87.6 HYPOKALEMIA: Status: RESOLVED | Noted: 2019-12-12 | Resolved: 2019-12-17

## 2019-12-17 LAB
ANION GAP SERPL CALCULATED.3IONS-SCNC: 7 MMOL/L (ref 4–13)
BACTERIA BLD CULT: NORMAL
BACTERIA BLD CULT: NORMAL
BUN SERPL-MCNC: 8 MG/DL (ref 5–25)
CALCIUM SERPL-MCNC: 8.9 MG/DL (ref 8.3–10.1)
CHLORIDE SERPL-SCNC: 104 MMOL/L (ref 100–108)
CO2 SERPL-SCNC: 30 MMOL/L (ref 21–32)
CREAT SERPL-MCNC: 0.84 MG/DL (ref 0.6–1.3)
ERYTHROCYTE [DISTWIDTH] IN BLOOD BY AUTOMATED COUNT: 12.7 % (ref 11.6–15.1)
GFR SERPL CREATININE-BSD FRML MDRD: 82 ML/MIN/1.73SQ M
GLUCOSE SERPL-MCNC: 123 MG/DL (ref 65–140)
GLUCOSE SERPL-MCNC: 136 MG/DL (ref 65–140)
GLUCOSE SERPL-MCNC: 138 MG/DL (ref 65–140)
GLUCOSE SERPL-MCNC: 173 MG/DL (ref 65–140)
GLUCOSE SERPL-MCNC: 192 MG/DL (ref 65–140)
HCT VFR BLD AUTO: 38.5 % (ref 36.5–49.3)
HGB BLD-MCNC: 12.9 G/DL (ref 12–17)
MCH RBC QN AUTO: 30.8 PG (ref 26.8–34.3)
MCHC RBC AUTO-ENTMCNC: 33.5 G/DL (ref 31.4–37.4)
MCV RBC AUTO: 92 FL (ref 82–98)
PLATELET # BLD AUTO: 246 THOUSANDS/UL (ref 149–390)
PMV BLD AUTO: 9.6 FL (ref 8.9–12.7)
POTASSIUM SERPL-SCNC: 4.2 MMOL/L (ref 3.5–5.3)
RBC # BLD AUTO: 4.19 MILLION/UL (ref 3.88–5.62)
SODIUM SERPL-SCNC: 141 MMOL/L (ref 136–145)
WBC # BLD AUTO: 8.31 THOUSAND/UL (ref 4.31–10.16)

## 2019-12-17 PROCEDURE — 82948 REAGENT STRIP/BLOOD GLUCOSE: CPT

## 2019-12-17 PROCEDURE — 99232 SBSQ HOSP IP/OBS MODERATE 35: CPT | Performed by: FAMILY MEDICINE

## 2019-12-17 PROCEDURE — 85027 COMPLETE CBC AUTOMATED: CPT | Performed by: INTERNAL MEDICINE

## 2019-12-17 PROCEDURE — 80048 BASIC METABOLIC PNL TOTAL CA: CPT | Performed by: INTERNAL MEDICINE

## 2019-12-17 RX ORDER — CIPROFLOXACIN 500 MG/1
500 TABLET, FILM COATED ORAL ONCE
Status: DISCONTINUED | OUTPATIENT
Start: 2019-12-17 | End: 2019-12-17

## 2019-12-17 RX ORDER — LEVOTHYROXINE SODIUM 0.1 MG/1
100 TABLET ORAL
Status: DISCONTINUED | OUTPATIENT
Start: 2019-12-18 | End: 2019-12-18 | Stop reason: HOSPADM

## 2019-12-17 RX ORDER — DIPHENHYDRAMINE HCL 25 MG
12.5 TABLET ORAL ONCE
Status: COMPLETED | OUTPATIENT
Start: 2019-12-17 | End: 2019-12-17

## 2019-12-17 RX ORDER — CIPROFLOXACIN 500 MG/1
500 TABLET, FILM COATED ORAL EVERY 12 HOURS SCHEDULED
Status: COMPLETED | OUTPATIENT
Start: 2019-12-17 | End: 2019-12-18

## 2019-12-17 RX ADMIN — ENOXAPARIN SODIUM 40 MG: 40 INJECTION SUBCUTANEOUS at 10:19

## 2019-12-17 RX ADMIN — INSULIN LISPRO 2 UNITS: 100 INJECTION, SOLUTION INTRAVENOUS; SUBCUTANEOUS at 12:56

## 2019-12-17 RX ADMIN — LEVOTHYROXINE SODIUM 112 MCG: 112 TABLET ORAL at 05:40

## 2019-12-17 RX ADMIN — CIPROFLOXACIN HYDROCHLORIDE 500 MG: 500 TABLET, FILM COATED ORAL at 17:51

## 2019-12-17 RX ADMIN — TAMSULOSIN HYDROCHLORIDE 0.4 MG: 0.4 CAPSULE ORAL at 17:51

## 2019-12-17 RX ADMIN — INSULIN LISPRO 1 UNITS: 100 INJECTION, SOLUTION INTRAVENOUS; SUBCUTANEOUS at 17:51

## 2019-12-17 RX ADMIN — DIPHENHYDRAMINE HCL 12.5 MG: 25 TABLET ORAL at 02:36

## 2019-12-18 ENCOUNTER — TELEPHONE (OUTPATIENT)
Dept: OTHER | Facility: OTHER | Age: 84
End: 2019-12-18

## 2019-12-18 VITALS
SYSTOLIC BLOOD PRESSURE: 137 MMHG | RESPIRATION RATE: 18 BRPM | DIASTOLIC BLOOD PRESSURE: 65 MMHG | TEMPERATURE: 98 F | WEIGHT: 138.45 LBS | OXYGEN SATURATION: 98 % | HEART RATE: 58 BPM

## 2019-12-18 PROBLEM — A41.9 SEPSIS WITHOUT ACUTE ORGAN DYSFUNCTION (HCC): Status: RESOLVED | Noted: 2019-12-12 | Resolved: 2019-12-18

## 2019-12-18 LAB
GLUCOSE SERPL-MCNC: 125 MG/DL (ref 65–140)
GLUCOSE SERPL-MCNC: 154 MG/DL (ref 65–140)

## 2019-12-18 PROCEDURE — 99238 HOSP IP/OBS DSCHRG MGMT 30/<: CPT | Performed by: FAMILY MEDICINE

## 2019-12-18 PROCEDURE — 82948 REAGENT STRIP/BLOOD GLUCOSE: CPT

## 2019-12-18 RX ORDER — CIPROFLOXACIN 500 MG/1
500 TABLET, FILM COATED ORAL EVERY 12 HOURS SCHEDULED
Qty: 3 TABLET | Refills: 0 | Status: SHIPPED | OUTPATIENT
Start: 2019-12-18 | End: 2019-12-20

## 2019-12-18 RX ORDER — LACTOBACILLUS ACIDOPH-L.BULGARICUS 1 MILLION CELL CHEWABLE TABLET 1MM CELL
1 TABLET,CHEWABLE ORAL
Qty: 30 TABLET | Refills: 0 | Status: SHIPPED | OUTPATIENT
Start: 2019-12-18 | End: 2019-12-28

## 2019-12-18 RX ORDER — LEVOTHYROXINE SODIUM 0.1 MG/1
100 TABLET ORAL DAILY
Qty: 30 TABLET | Refills: 0 | Status: SHIPPED | OUTPATIENT
Start: 2019-12-18 | End: 2020-07-09

## 2019-12-18 RX ADMIN — CIPROFLOXACIN HYDROCHLORIDE 500 MG: 500 TABLET, FILM COATED ORAL at 09:30

## 2019-12-18 RX ADMIN — ENOXAPARIN SODIUM 40 MG: 40 INJECTION SUBCUTANEOUS at 09:30

## 2019-12-18 RX ADMIN — LEVOTHYROXINE SODIUM 100 MCG: 100 TABLET ORAL at 05:26

## 2019-12-19 ENCOUNTER — TELEPHONE (OUTPATIENT)
Dept: OTHER | Facility: OTHER | Age: 84
End: 2019-12-19

## 2019-12-19 NOTE — TELEPHONE ENCOUNTER
Dr Dorothy Foster was paged via [facility] @ 1931: [address] 1935/ pt  is on cipro, has rash with hives

## 2019-12-20 ENCOUNTER — NURSING HOME VISIT (OUTPATIENT)
Dept: GERIATRICS | Facility: OTHER | Age: 84
End: 2019-12-20
Payer: MEDICARE

## 2019-12-20 DIAGNOSIS — F01.50 VASCULAR DEMENTIA WITHOUT BEHAVIORAL DISTURBANCE (HCC): Chronic | ICD-10-CM

## 2019-12-20 DIAGNOSIS — N30.00 ACUTE CYSTITIS WITHOUT HEMATURIA: ICD-10-CM

## 2019-12-20 DIAGNOSIS — R26.2 AMBULATORY DYSFUNCTION: Primary | ICD-10-CM

## 2019-12-20 DIAGNOSIS — E11.9 TYPE 2 DIABETES MELLITUS WITHOUT COMPLICATION, WITHOUT LONG-TERM CURRENT USE OF INSULIN (HCC): Chronic | ICD-10-CM

## 2019-12-20 PROCEDURE — 99306 1ST NF CARE HIGH MDM 50: CPT | Performed by: FAMILY MEDICINE

## 2019-12-20 NOTE — TELEPHONE ENCOUNTER
Dr Queenie Oppenheim was paged via [facility] @ 2015: [phone]/ [place]/ [address]azzy [place]/ 1935/ requesting order for chest x-ray to rule out TB, rash on arm/ back-up number [phone]  Caller advised to call back if MD doesn't respond within 20 minutes

## 2019-12-20 NOTE — PROGRESS NOTES
[address]  [address]  History and Physical    NAME: Miguel Morin  AGE: 82 y o  SEX: male [id number]    DATE OF ENCOUNTER: 12/21/2019    Code status:  CPR    Assessment and Plan     1  Ambulatory dysfunction     - PT/OT ordered     - Fall precautions    2  Vascular dementia without behavioral disturbance (HCC)     - supportive care     - redirection, reorientation    3  Type 2 diabetes mellitus without complication, without long-term current use of insulin (HCC)     - ordered accuchecks     - cont glipizide, linagliptin, metformin    4  Acute cystitis without hematuria     - improved, encourage po hydration     - complete ciprofloxacin  CBC, BMP ordered  All medications and routine orders were reviewed and updated as needed  Plan discussed with: patient, staff    Chief Complaint     Seen for admission at [address] [place] [address], a 83 y/o male admitted to the hospital with UTI, treated with Rocephin, changed to cipro  His overall functional status improved, discharged to [facility] for rehab  He was seen and examined at bedside, stable, can't give much history due to dementia/language barrier  He denies any headaches, CP, SOB, abdominal pain, constipation or diarrhea  He is sitting next to nurses station in wheelchair  No c/o from staff  HISTORY:  Past Medical History:   Diagnosis Date    Asthma     Diabetes mellitus ([facility] [address] )     Glaucoma      Family History   Family history unknown: Yes     Social History     Socioeconomic History    Marital status:       Spouse name: None    Number of children: None    Years of education: None    Highest education level: None   Occupational History    None   Social Needs    Financial resource strain: None    Food insecurity:     Worry: None     Inability: None    Transportation needs:     Medical: None     Non-medical: None   Tobacco Use    Smoking status: Never Smoker    Smokeless tobacco: Never Used   Substance and Sexual Activity    Alcohol use: Not Currently    Drug use: No    Sexual activity: None   Lifestyle    Physical activity:     Days per week: None     Minutes per session: None    Stress: None   Relationships    Social connections:     Talks on phone: None     Gets together: None     Attends Hindu service: None     Active member of club or organization: None     Attends meetings of clubs or organizations: None     Relationship status: None    Intimate partner violence:     Fear of current or ex partner: None     Emotionally abused: None     Physically abused: None     Forced sexual activity: None   Other Topics Concern    None   Social History Narrative    None       Allergies:  No Known Allergies    Review of Systems     Review of Systems   Unable to perform ROS: Dementia   As in HPI  Medications and orders     All medications reviewed and updated in Nursing Home EMR  Objective     Vitals: T: 97 9, P: 74, R: 18, BP: 131/71, 97% on RA, Wt: 138 6 lbs    Physical Exam   Constitutional: He appears well-developed and well-nourished  No distress  HENT:   Head: Normocephalic and atraumatic  Mouth/Throat: Oropharynx is clear and moist  No oropharyngeal exudate  Eyes: Pupils are equal, round, and reactive to light  Conjunctivae and EOM are normal  Right eye exhibits no discharge  Left eye exhibits no discharge  No scleral icterus  Neck: Normal range of motion  Neck supple  Cardiovascular: Normal rate, regular rhythm and normal heart sounds  Exam reveals no friction rub  No murmur heard  Pulmonary/Chest: Effort normal and breath sounds normal  No respiratory distress  He has no wheezes  He exhibits no tenderness  Abdominal: Soft  Bowel sounds are normal  He exhibits no distension  There is no tenderness  Musculoskeletal: Normal range of motion  He exhibits no edema, tenderness or deformity     Neurological: He is alert  No cranial nerve deficit or sensory deficit  He exhibits normal muscle tone  Coordination normal    Oriented to self  Skin: Skin is warm and dry  He is not diaphoretic  Psychiatric: He has a normal mood and affect  His behavior is normal    Nursing note and vitals reviewed  Pertinent Laboratory/Diagnostic Studies: The following labs/studies were reviewed please see chart or hospital paperwork for details  Ref Range & Units 12/17/19 0541 12/15/19 0515    Sodium 136 - 145 mmol/L 141  137    Potassium 3 5 - 5 3 mmol/L 4 2  3 8    Chloride 100 - 108 mmol/L 104  103    CO2 21 - 32 mmol/L 30  27    ANION GAP 4 - 13 mmol/L 7  7    BUN 5 - 25 mg/dL 8  9    Creatinine 0 60 - 1 30 mg/dL 0 84  0 80 CM   Comment: Standardized to IDMS reference method   Glucose 65 - 140 mg/dL 136  130 CM      Calcium 8 3 - 10 1 mg/dL 8 9  8 6    eGFR ml/min/1 73sq m 82  83      Ref Range & Units 12/17/19 0541    WBC 4 31 - 10 16 Thousand/uL 8 31    RBC 3 88 - 5 62 Million/uL 4 19    Hemoglobin 12 0 - 17 0 g/dL 12 9    Hematocrit 36 5 - 49 3 % 38 5    MCV 82 - 98 fL 92    MCH 26 8 - 34 3 pg 30 8    MCHC 31 4 - 37 4 g/dL 33 5    RDW 11 6 - 15 1 % 12 7    Platelets 532 - 739 Thousands/uL 246    MPV 8 9 - 12 7 fL 9 6      DOS: 12/19/19  Facility: [address] List: [place]  BILLING CODE: [id number] place of service: POS [facility] Coverage  Diagnoses:   Diagnosis ICD-10-CM Associated Orders   1  Ambulatory dysfunction R26 2    2  Vascular dementia without behavioral disturbance (HCC) F01 50    3  Type 2 diabetes mellitus without complication, without long-term current use of insulin (HCC) E11 9    4   Acute cystitis without hematuria N30 00

## 2019-12-23 ENCOUNTER — NURSING HOME VISIT (OUTPATIENT)
Dept: GERIATRICS | Facility: OTHER | Age: 84
End: 2019-12-23
Payer: MEDICARE

## 2019-12-23 DIAGNOSIS — R26.2 AMBULATORY DYSFUNCTION: ICD-10-CM

## 2019-12-23 DIAGNOSIS — E03.9 HYPOTHYROIDISM, UNSPECIFIED TYPE: Chronic | ICD-10-CM

## 2019-12-23 DIAGNOSIS — E11.9 TYPE 2 DIABETES MELLITUS WITHOUT COMPLICATION, WITHOUT LONG-TERM CURRENT USE OF INSULIN (HCC): Chronic | ICD-10-CM

## 2019-12-23 DIAGNOSIS — F01.50 VASCULAR DEMENTIA WITHOUT BEHAVIORAL DISTURBANCE (HCC): Primary | Chronic | ICD-10-CM

## 2019-12-23 DIAGNOSIS — E78.5 HYPERLIPIDEMIA, UNSPECIFIED HYPERLIPIDEMIA TYPE: Chronic | ICD-10-CM

## 2019-12-23 PROBLEM — R63.0 POOR APPETITE: Status: ACTIVE | Noted: 2019-12-23

## 2019-12-23 PROCEDURE — 99309 SBSQ NF CARE MODERATE MDM 30: CPT | Performed by: PHYSICIAN ASSISTANT

## 2019-12-24 NOTE — PROGRESS NOTES
[facility]  [address]  [phone]  [address]  [place]         NAME: Keya Thompson  AGE: 82 y o  SEX: male    DATE OF ENCOUNTER: 12/23/2019    Assessment and Plan     Hypothyroidism  Continue levothyroxine 100mcg PO QD  Type 2 diabetes mellitus, without long-term current use of insulin (HCC)    Lab Results   Component Value Date    HGBA1C 6 5 (H) 12/12/2019     Blood sugar log reviewed, stable  Continue glipizide 5 mg PO BID, metformin 1000mg PO BID, and tradjenta 5 mg PO QD  Continue POC glucose checks to monitor  Dementia ([facility] [address] )  Continue redirection and reorientation  Continue supportive care with ADLs  Pt requires close observation  Pt wanders and is fall risk  Continue to monitor  Ambulatory dysfunction  Pt fall risk  Continue fall precautions  Continue using wheelchair when not at therapies  Continue to observe closely  Continue PT/OT to improve functional status  Hyperlipidemia  Continue atorvastatin 20mg PO QHS  Poor appetite  Dietary consult/evaluation  All medications and routine orders were reviewed and updated as needed  Chief Complaint     SNF Follow-up    History of Present Illness     RA is an 81 yo M with multiple medical comorbidities including but not limited to dementia, hypothyroidism, and Type 2 DM, interviewed and examined in collaboration with nursing for SNF follow-up  Pt unable to provide history due to dementia  Nursing notes pt has a poor appetite  Nursing also reports pt with fall at facility, neuro-checks were in place and with no deficit  No other concerns  The patient's allergies, past medical, surgical, social and family history were reviewed and unchanged  Review of Systems     Review of Systems   Unable to perform ROS: Dementia         Objective     Vitals: 97 9F-131/71-18-97% on RA    Blood sugar: 127    Physical Exam   Constitutional: No distress     Chronically-ill appearing elderly male    HENT:   Head: Normocephalic and atraumatic  Nose: Nose normal    Mouth/Throat: Oropharynx is clear and moist  No oropharyngeal exudate  Eyes: Pupils are equal, round, and reactive to light  Conjunctivae are normal  Right eye exhibits no discharge  Left eye exhibits no discharge  No scleral icterus  Cardiovascular: Normal rate and regular rhythm  Exam reveals no gallop and no friction rub  No murmur heard  Pulmonary/Chest: Effort normal and breath sounds normal  No stridor  No respiratory distress  He has no wheezes  He has no rales  Abdominal: Soft  Bowel sounds are normal  He exhibits no distension  There is no tenderness  There is no rebound and no guarding  Musculoskeletal: Normal range of motion  He exhibits no edema or tenderness  Neurological: He is alert  With confusion   Skin: He is not diaphoretic  Psychiatric:   Pleasant and cooperative during exam    Nursing note and vitals reviewed  Pertinent Laboratory/Diagnostic Studies:  Reviewed in facility chart     Current Medications   Medications reviewed and updated see facility chart for details        Current Outpatient Medications:     albuterol (PROVENTIL HFA,VENTOLIN HFA) 90 mcg/act inhaler, Inhale 2 puffs every 4 (four) hours as needed for wheezing, Disp: 1 Inhaler, Rfl: 0    atorvastatin (LIPITOR) 20 mg tablet, Take 20 mg by mouth daily, Disp: , Rfl:     glipiZIDE (GLUCOTROL XL) 5 mg 24 hr tablet, Take 5 mg by mouth 2 (two) times a day, Disp: , Rfl:     lactobacillus acidophilus-bulgaricus (LACTINEX) chewable tablet, Chew 1 tablet 3 (three) times a day with meals for 10 days, Disp: 30 tablet, Rfl: 0    levothyroxine 100 mcg tablet, Take 1 tablet (100 mcg total) by mouth daily, Disp: 30 tablet, Rfl: 0    Linagliptin (TRADJENTA) 5 MG TABS, Take 5 mg by mouth daily, Disp: , Rfl:     metFORMIN (GLUCOPHAGE) 1000 MG tablet, Take 1,000 mg by mouth 2 (two) times a day with meals, Disp: , Rfl:     tamsulosin (FLOMAX) 0 4 mg, Take 0 4 mg by mouth daily with dinner, Disp: , Rfl:       Osiris Manrique PA-C  12/23/2019 9:20 PM

## 2019-12-24 NOTE — ASSESSMENT & PLAN NOTE
Pt fall risk  Continue fall precautions  Continue using wheelchair when not at therapies  Continue to observe closely  Continue PT/OT to improve functional status

## 2019-12-24 NOTE — ASSESSMENT & PLAN NOTE
Continue redirection and reorientation  Continue supportive care with ADLs  Pt requires close observation  Pt wanders and is fall risk  Continue to monitor

## 2019-12-24 NOTE — ASSESSMENT & PLAN NOTE
Lab Results   Component Value Date    HGBA1C 6 5 (H) 12/12/2019     Blood sugar log reviewed, stable  Continue glipizide 5 mg PO BID, metformin 1000mg PO BID, and tradjenta 5 mg PO QD  Continue POC glucose checks to monitor

## 2019-12-30 ENCOUNTER — NURSING HOME VISIT (OUTPATIENT)
Dept: GERIATRICS | Facility: OTHER | Age: 84
End: 2019-12-30
Payer: MEDICARE

## 2019-12-30 DIAGNOSIS — E03.9 HYPOTHYROIDISM, UNSPECIFIED TYPE: Chronic | ICD-10-CM

## 2019-12-30 DIAGNOSIS — E11.9 TYPE 2 DIABETES MELLITUS WITHOUT COMPLICATION, WITHOUT LONG-TERM CURRENT USE OF INSULIN (HCC): Chronic | ICD-10-CM

## 2019-12-30 DIAGNOSIS — F01.50 VASCULAR DEMENTIA WITHOUT BEHAVIORAL DISTURBANCE (HCC): Primary | Chronic | ICD-10-CM

## 2019-12-30 DIAGNOSIS — R26.2 AMBULATORY DYSFUNCTION: ICD-10-CM

## 2019-12-30 DIAGNOSIS — E78.5 HYPERLIPIDEMIA, UNSPECIFIED HYPERLIPIDEMIA TYPE: Chronic | ICD-10-CM

## 2019-12-30 PROBLEM — J45.20 MILD INTERMITTENT ASTHMA WITHOUT COMPLICATION: Status: ACTIVE | Noted: 2019-12-30

## 2019-12-30 PROBLEM — R39.12 BENIGN PROSTATIC HYPERPLASIA WITH WEAK URINARY STREAM: Status: ACTIVE | Noted: 2019-12-30

## 2019-12-30 PROBLEM — N40.1 BENIGN PROSTATIC HYPERPLASIA WITH WEAK URINARY STREAM: Status: ACTIVE | Noted: 2019-12-30

## 2019-12-30 PROBLEM — T78.40XA ALLERGIC REACTION: Status: ACTIVE | Noted: 2019-12-30

## 2019-12-30 PROCEDURE — 99316 NF DSCHRG MGMT 30 MIN+: CPT | Performed by: PHYSICIAN ASSISTANT

## 2019-12-30 RX ORDER — DIPHENHYDRAMINE HCL 25 MG
25 TABLET ORAL EVERY 6 HOURS PRN
COMMUNITY
End: 2020-07-09

## 2019-12-30 NOTE — ASSESSMENT & PLAN NOTE
Pt with cutaneous reaction to tuberculosis skin test  CXR negative for tuberculosis  Diphenhydramine 25mg PO Q6H prn for itchiness/rash

## 2019-12-30 NOTE — ASSESSMENT & PLAN NOTE
Encourage pt to use RW at home  Continue fall precautions   Continue improving functional status with participation in home PT

## 2019-12-30 NOTE — ASSESSMENT & PLAN NOTE
Lab Results   Component Value Date    HGBA1C 6 5 (H) 12/12/2019     Blood sugar log reviewed, stable with exception of 1 blood sugar in 300s   Continue glipizide 5 mg PO BID, metformin 1000mg PO BID, and tradjenta 3mg PO QD

## 2019-12-30 NOTE — PROGRESS NOTES
[facility]  [address]  [phone]  DISCHARGE SUMMARY- Code 32  Facility: [facility]    NAME: Olena العراقي  AGE: 82 y o  SEX: male  DATE OF ADMISSION: 12/18/19  DATE OF DISCHARGE:12/31/19  DISCHARGE DISPOSITION: Home with daughter  Reason for admission: Patient was admitted from [facility] for rehabilitation after hospitalization for UTI with hematuria  Course of stay: Patient was admitted to [facility] for rehabilitation due to ambulatory dysfunction due to UTI with hematuria  Significant events during the stay include pt wandering and requesting to go home, requiring wanderguard for pt's safety  The patient participated in PT/OT  SUBJECTIVE:  RA is an 83 yo M with multiple medical comorbitidites including but not limited to vascular dementia, type 2 DM, asthma, hypothyroidism, and BPH, interviewed and examined in collaboration with nursing for SNF discharge  Pt's history is limited due to dementia  Pt denies pain  No concerns from nursing  ROS:  Review of Systems   Unable to perform ROS: Dementia       PHYSICAL EXAM:    Vitals: 124/67-97 8F-81 bpm-136 6lb- 18- 98% on RA  Blood sugar: 86     Physical Exam   Constitutional: No distress  HENT:   Head: Normocephalic and atraumatic  Nose: Nose normal    Mouth/Throat: Oropharynx is clear and moist  No oropharyngeal exudate  Eyes: Pupils are equal, round, and reactive to light  Conjunctivae are normal  Right eye exhibits no discharge  Left eye exhibits no discharge  No scleral icterus  Cardiovascular: Normal rate and regular rhythm  Pulmonary/Chest: Effort normal and breath sounds normal  No stridor  No respiratory distress  He has no wheezes  He has no rales  Abdominal: Soft  Bowel sounds are normal  He exhibits no distension  There is no tenderness  There is no rebound and no guarding  Musculoskeletal: He exhibits no edema  Able to move all 4 extremities   Neurological: He is alert  Skin: Rash noted  He is not diaphoretic  There is erythema  Erythematous, pruritic macular papular rash on forearm at site of skin Tb test     Psychiatric:   Pleasant during exam  Mostly cooperative but with some confusion due to dementia   Nursing note and vitals reviewed  Admission Diagnoses: Hypothyroidism  Continue levothyroxine 100mcg PO QAM      Type 2 diabetes mellitus, without long-term current use of insulin ([facility])    Lab Results   Component Value Date    HGBA1C 6 5 (H) 12/12/2019     Blood sugar log reviewed, stable with exception of 1 blood sugar in 300s  Continue glipizide 5 mg PO BID, metformin 1000mg PO BID, and tradjenta 3mg PO QD  Dementia ([address] )  Continue redirection and reorientation  Continue to assist in ADLs  SW discussed with patient's daughter that it is important pt has 24/7 support at home due to his wandering and advancement of dementia  Ambulatory dysfunction  Encourage pt to use RW at home  Continue fall precautions  Continue improving functional status with participation in home PT  Hyperlipidemia  Continue atorvastatin 20 mg PO QHS  Benign prostatic hyperplasia with weak urinary stream  Continue tamsulosin 0 4mg PO QD  Mild intermittent asthma without complication  Continue albuterol inhaler 2 puff Q4H prn for Sob  Allergic reaction  Pt with cutaneous reaction to tuberculosis skin test  CXR negative for tuberculosis  Diphenhydramine 25mg PO Q6H prn for itchiness/rash  Follow-up Recommendations: Follow-up with PCP and urology    Labs and testing performed during stay:  BMP 12/20/19  Glucose 93  BUN 25  Creatinine 0 92  Sodium 140  Potassium 4 9  GFR 77    CXR 12/19/2019  No acute cardiopulmonary disease  No radiographic evidence of active pulmonary tuberculosis       New Medications: Diphenhydramine 25mg PO Q6H prn itchiness/rash   Medication Changes: None   Discontinued Medications: None   Discharge Medications: See discharge medication list which was reviewed and signed  Discussion with patient/family and further instructions:  -Fall precautions  -Aspiration precautions  -Bleeding precautions  -Monitor for signs/symptoms of infection  -Medication list was reviewed and signed  -DME form was completed    Status at time of discharge: Stable    Billing based on time     Time spent on unit: 40 minutes  Time spent counseling pt on debility/condition: 30 minutes     Enid Delvalle PA-C  12/30/20198:04 PM

## 2019-12-30 NOTE — ASSESSMENT & PLAN NOTE
Continue redirection and reorientation  Continue to assist in ADLs  SW discussed with patient's daughter that it is important pt has 24/7 support at home due to his wandering and advancement of dementia

## 2020-07-02 ENCOUNTER — HOSPITAL ENCOUNTER (EMERGENCY)
Facility: HOSPITAL | Age: 85
Discharge: HOME | End: 2020-07-02
Attending: EMERGENCY MEDICINE | Admitting: EMERGENCY MEDICINE
Payer: MEDICARE

## 2020-07-02 VITALS
HEART RATE: 76 BPM | SYSTOLIC BLOOD PRESSURE: 159 MMHG | WEIGHT: 130.07 LBS | TEMPERATURE: 98.6 F | DIASTOLIC BLOOD PRESSURE: 70 MMHG | RESPIRATION RATE: 18 BRPM | OXYGEN SATURATION: 98 %

## 2020-07-02 DIAGNOSIS — N48.1 BALANITIS: Primary | ICD-10-CM

## 2020-07-02 LAB — GLUCOSE SERPL-MCNC: 81 MG/DL (ref 65–140)

## 2020-07-02 PROCEDURE — 99283 EMERGENCY DEPT VISIT LOW MDM: CPT

## 2020-07-02 PROCEDURE — 82948 REAGENT STRIP/BLOOD GLUCOSE: CPT

## 2020-07-02 PROCEDURE — 99284 EMERGENCY DEPT VISIT MOD MDM: CPT | Performed by: PHYSICIAN ASSISTANT

## 2020-07-02 RX ORDER — AMOXICILLIN AND CLAVULANATE POTASSIUM 875; 125 MG/1; MG/1
1 TABLET, FILM COATED ORAL EVERY 12 HOURS
Qty: 14 TABLET | Refills: 0 | Status: SHIPPED | OUTPATIENT
Start: 2020-07-02 | End: 2020-07-08

## 2020-07-02 RX ORDER — ACETAMINOPHEN 325 MG/1
650 TABLET ORAL ONCE
Status: COMPLETED | OUTPATIENT
Start: 2020-07-02 | End: 2020-07-02

## 2020-07-02 RX ORDER — AMOXICILLIN AND CLAVULANATE POTASSIUM 875; 125 MG/1; MG/1
1 TABLET, FILM COATED ORAL ONCE
Status: COMPLETED | OUTPATIENT
Start: 2020-07-02 | End: 2020-07-02

## 2020-07-02 RX ORDER — CLOTRIMAZOLE 1 %
CREAM (GRAM) TOPICAL 2 TIMES DAILY
Status: DISCONTINUED | OUTPATIENT
Start: 2020-07-02 | End: 2020-07-02 | Stop reason: HOSPADM

## 2020-07-02 RX ADMIN — CLOTRIMAZOLE 1 APPLICATION: 1 CREAM TOPICAL at 19:56

## 2020-07-02 RX ADMIN — AMOXICILLIN AND CLAVULANATE POTASSIUM 1 TABLET: 875; 125 TABLET, FILM COATED ORAL at 19:45

## 2020-07-02 RX ADMIN — ACETAMINOPHEN 650 MG: 325 TABLET, FILM COATED ORAL at 19:45

## 2020-07-03 NOTE — ED NOTES
Pt discharge instructions reviewed by provider, Pt has no further questions at this time  Pt awake and alert, no signs of acute distress noted  Pt ambulated out of ED with a steady gait       Swapna Atkinson RN  07/02/20 2034

## 2020-07-06 NOTE — ED PROVIDER NOTES
EMERGENCY MEDICINE NOTE        PATIENT IDENTIFICATION PHYSICIAN/SERVICE INFORMATION   Name: Warden Acevedo  MRN: [id number]  YOB: 1935  Age/Sex: 82 y o  male  Preferred Language: English  Code Status: Prior  Encounter Date: 7/2/2020  Attending Physician: No att  providers found  Admitting Physician: No admitting provider for patient encounter  Primary Care Physician: Marilee Snellen, MD         Primary Care Phone: [phone]       CHIEF COMPLAINT     Chief Complaint   Patient presents with    Penis / Scrotum Problem     per daughter, she noticed while bathing patient that on tip of penis its red/excoriated w/ clear discharge per daughter-just noticed today  HISTORY OF PRESENT ILLNESS     Warden Acevedo is a 82 y o  male who presents due to Penile Rash  Pt hx dementia, limited history  Daughter reports patient with rash on penis ongoing over days now worse  Daughter states that the tip appears red/excoriate with discharge which she noticed just today  Daughter states patient is incontinent, requires diaper--sts she applies two diapers on father, changes twice daily typically  No previous similar episodes  No other complaints at this time  History provided by:  Patient and relative  History limited by:  Dementia   used: No    Penis / Scrotum Problem         PAST MEDICAL AND SURGICAL HISTORY     Past Medical History:   Diagnosis Date    Asthma     Diabetes mellitus ([facility] )     Glaucoma        History reviewed  No pertinent surgical history  Family History   Family history unknown: Yes       E-Cigarette/Vaping     E-Cigarette/Vaping Substances     Social History     Tobacco Use    Smoking status: Never Smoker    Smokeless tobacco: Never Used   Substance Use Topics    Alcohol use: Not Currently    Drug use: No         ALLERGIES     No Known Allergies      HOME MEDICATIONS     Prior to Admission Medications   Prescriptions Last Dose Informant Patient Reported? Taking? Linagliptin (TRADJENTA) 5 MG TABS   Yes No   Sig: Take 5 mg by mouth daily   albuterol (PROVENTIL HFA,VENTOLIN HFA) 90 mcg/act inhaler   No No   Sig: Inhale 2 puffs every 4 (four) hours as needed for wheezing   atorvastatin (LIPITOR) 20 mg tablet   Yes No   Sig: Take 20 mg by mouth daily   diphenhydrAMINE (BENADRYL) 25 mg tablet   Yes No   Sig: Take 25 mg by mouth every 6 (six) hours as needed   glipiZIDE (GLUCOTROL XL) 5 mg 24 hr tablet   Yes No   Sig: Take 5 mg by mouth 2 (two) times a day   levothyroxine 100 mcg tablet   No No   Sig: Take 1 tablet (100 mcg total) by mouth daily   metFORMIN (GLUCOPHAGE) 1000 MG tablet   Yes No   Sig: Take 1,000 mg by mouth 2 (two) times a day with meals   tamsulosin (FLOMAX) 0 4 mg   Yes No   Sig: Take 0 4 mg by mouth daily with dinner      Facility-Administered Medications: None         REVIEW OF SYSTEMS     Review of Systems   Unable to perform ROS: Dementia         PHYSICAL EXAMINATION     ED Triage Vitals [07/02/20 1929]   Temperature Pulse Respirations Blood Pressure SpO2   98 6 °F (37 °C) 76 18 159/70 98 %      Temp Source Heart Rate Source Patient Position - Orthostatic VS BP Location FiO2 (%)   Oral Monitor Sitting Right arm --      Pain Score       --         Wt Readings from Last 3 Encounters:   07/02/20 59 kg (130 lb 1 1 oz)   12/12/19 62 8 kg (138 lb 7 2 oz)   02/15/19 62 6 kg (138 lb)         Physical Exam   Constitutional: He is oriented to person, place, and time  He appears well-developed and well-nourished  No distress  HENT:   Head: Normocephalic and atraumatic  Mouth/Throat: Oropharynx is clear and moist    Eyes: Pupils are equal, round, and reactive to light  Conjunctivae and EOM are normal    Neck: Normal range of motion  Neck supple  Cardiovascular: Normal rate, regular rhythm, normal heart sounds and intact distal pulses  Exam reveals no gallop and no friction rub  No murmur heard    Pulmonary/Chest: Effort normal and breath sounds normal  No respiratory distress  He has no wheezes  He has no rales  He exhibits no tenderness  Abdominal: Soft  Bowel sounds are normal  He exhibits no distension and no mass  There is no hepatosplenomegaly  There is no tenderness  There is no rigidity, no rebound, no guarding, no CVA tenderness, no tenderness at McBurney's point and negative Izaguirre's sign  No hernia  Negative Izaguirre's  Negative Appendiceal signs (Psoas, Rovsing's, Obturator)  Negative Peritoneal Signs   Genitourinary: Testes normal  Right testis shows no swelling and no tenderness  Left testis shows no swelling and no tenderness  Circumcised  Musculoskeletal: Normal range of motion  Neurological: He is alert and oriented to person, place, and time  Skin: Skin is warm and dry  Capillary refill takes less than 2 seconds  He is not diaphoretic  Nursing note and vitals reviewed  DIAGNOSTIC RESULTS     Laboratory results:    Labs Reviewed   POCT GLUCOSE - Normal       Result Value Ref Range Status    POC Glucose 81  65 - 140 mg/dl Final       All labs reviewed and utilized in the medical decision making process    Radiology results:    No orders to display       All radiology studies independently viewed by me and interpreted by the radiologist       PROCEDURES     Procedures        ASSESSMENT AND PLAN     MDM  Number of Diagnoses or Management Options  Balanitis: new, no workup     Amount and/or Complexity of Data Reviewed  Obtain history from someone other than the patient: yes  Review and summarize past medical records: yes    Risk of Complications, Morbidity, and/or Mortality  Presenting problems: low  Diagnostic procedures: low  Management options: low    Patient Progress  Patient progress: stable      Initial ED assessment:  Ree Tovar is a 82 y o  male with significant PMH for dementia who presents with penile rash  Vitals signs reviewed and WNL   Physical examination is remarkable for Mild Skin breakdown, thin white discharge around edge of prepuce    Initial Ddx  includes but is not limited to:   balanitis, posthitis, balanoposthitis, doubt HSV, STD, syphilis, cellulitis,  tinea cruris, tumor  Initial ED plan:   Plan will be to treat symptomatically  Final ED summary/disposition: Home care recommendations given with discharge paperwork  Return to ED instructions given if new/worsening sxs  Verbalized understanding  MDM  Reviewed: previous chart, nursing note and vitals          ED COURSE OF CARE AND REASSESSMENT                                          Medications   amoxicillin-clavulanate (AUGMENTIN) 875-125 mg per tablet 1 tablet (1 tablet Oral Given 7/2/20 1945)   acetaminophen (TYLENOL) tablet 650 mg (650 mg Oral Given 7/2/20 1945)         FINAL IMPRESSION     Final diagnoses:   Balanitis         DISPOSITION AND PLANNING     Time reflects when diagnosis was documented in both MDM as applicable and the Disposition within this note     Time User Action Codes Description Comment    7/2/2020  8:10 PM Jennifer Lombardo Add [N48 1] Balanitis       ED Disposition     ED Disposition Condition Date/Time Comment    Discharge Stable Thu Jul 2, 2020  8:10 PM Sean Dumont discharge to home/self care  Follow-up Information     Follow up With Specialties Details Why Contact Info Additional Information    Sheila Cuadra MD Internal Medicine Call in 2 days For follow up [facility] Emergency Department Emergency Medicine Go to  If symptoms worsen [address] [address]  [phone] AN ED, [address], [facility] [place], [place],   Elaine Pastrana MD Family Medicine Call  [facility]     [address] [facility]  [phone]                 PATIENT REFERRAL     Sheila Cuadra MD  [address] [place] [address]   [phone]    Call in 2 days  For follow up    [facility] [address] [facility] [address] [address]  [phone]  Go to   If symptoms worsen    MD [address]  [place] [address]  [phone]    Call   For follow up        [address]     Discharge Medication List as of 7/2/2020  8:14 PM      START taking these medications    Details   amoxicillin-clavulanate (AUGMENTIN) 875-125 mg per tablet Take 1 tablet by mouth every 12 (twelve) hours for 7 days, Starting Thu 7/2/2020, Until Thu 7/9/2020, Print         CONTINUE these medications which have NOT CHANGED    Details   albuterol (PROVENTIL HFA,VENTOLIN HFA) 90 mcg/act inhaler Inhale 2 puffs every 4 (four) hours as needed for wheezing, Starting Sat 2/16/2019, Print      atorvastatin (LIPITOR) 20 mg tablet Take 20 mg by mouth daily, Historical Med      diphenhydrAMINE (BENADRYL) 25 mg tablet Take 25 mg by mouth every 6 (six) hours as needed, Historical Med      glipiZIDE (GLUCOTROL XL) 5 mg 24 hr tablet Take 5 mg by mouth 2 (two) times a day, Historical Med      levothyroxine 100 mcg tablet Take 1 tablet (100 mcg total) by mouth daily, Starting Wed 12/18/2019, Until Fri 1/17/2020, Print      Linagliptin (TRADJENTA) 5 MG TABS Take 5 mg by mouth daily, Historical Med      metFORMIN (GLUCOPHAGE) 1000 MG tablet Take 1,000 mg by mouth 2 (two) times a day with meals, Historical Med      tamsulosin (FLOMAX) 0 4 mg Take 0 4 mg by mouth daily with dinner, Historical Med             No discharge procedures on file      PDMP Review     None          YULIYA Wilson PA-C  07/06/20 9231

## 2020-07-08 ENCOUNTER — TELEPHONE (OUTPATIENT)
Dept: FAMILY MEDICINE CLINIC | Facility: CLINIC | Age: 85
End: 2020-07-08

## 2020-07-08 PROBLEM — I69.90 LATE EFFECTS OF CEREBROVASCULAR DISEASE: Status: ACTIVE | Noted: 2017-12-11

## 2020-07-08 PROBLEM — N39.0 UTI (URINARY TRACT INFECTION): Status: RESOLVED | Noted: 2019-12-12 | Resolved: 2020-07-08

## 2020-07-08 PROBLEM — K57.90 DIVERTICULOSIS: Status: ACTIVE | Noted: 2020-07-08

## 2020-07-08 PROBLEM — E78.2 MIXED HYPERLIPIDEMIA: Status: ACTIVE | Noted: 2019-12-12

## 2020-07-08 PROBLEM — E03.9 ACQUIRED HYPOTHYROIDISM: Status: ACTIVE | Noted: 2019-12-12

## 2020-07-08 NOTE — TELEPHONE ENCOUNTER
1  Do you presently have a fever or flu-like symptoms? No  2  Do you have symptoms of an upper respiratory infection like runny nose, sore throat, or cough? No  3  Are you suffering from new headache that you have not had in the past?  No  4  Do you have/have you experienced any new shortness of breath recently? No  5  Do you have any new diarrhea, nausea or vomiting? No  6  Have you been in contact with anyone who has been sick or diagnosed with COVID-19?  No      Laura lopezwsarya NO

## 2020-07-09 ENCOUNTER — OFFICE VISIT (OUTPATIENT)
Dept: FAMILY MEDICINE CLINIC | Facility: CLINIC | Age: 85
End: 2020-07-09
Payer: MEDICARE

## 2020-07-09 VITALS
WEIGHT: 127 LBS | DIASTOLIC BLOOD PRESSURE: 60 MMHG | BODY MASS INDEX: 24.94 KG/M2 | OXYGEN SATURATION: 98 % | HEART RATE: 68 BPM | SYSTOLIC BLOOD PRESSURE: 116 MMHG | RESPIRATION RATE: 12 BRPM | HEIGHT: 60 IN | TEMPERATURE: 98 F

## 2020-07-09 DIAGNOSIS — B35.6 TINEA CRURIS: ICD-10-CM

## 2020-07-09 DIAGNOSIS — E03.9 ACQUIRED HYPOTHYROIDISM: ICD-10-CM

## 2020-07-09 DIAGNOSIS — Z91.81 AT HIGH RISK FOR FALLS: ICD-10-CM

## 2020-07-09 DIAGNOSIS — E78.2 MIXED HYPERLIPIDEMIA: ICD-10-CM

## 2020-07-09 DIAGNOSIS — G30.1 LATE ONSET ALZHEIMER'S DISEASE WITHOUT BEHAVIORAL DISTURBANCE (HCC): ICD-10-CM

## 2020-07-09 DIAGNOSIS — R26.81 GAIT INSTABILITY: ICD-10-CM

## 2020-07-09 DIAGNOSIS — E56.9 VITAMIN DEFICIENCY: ICD-10-CM

## 2020-07-09 DIAGNOSIS — N48.1 BALANITIS: ICD-10-CM

## 2020-07-09 DIAGNOSIS — E11.9 TYPE 2 DIABETES MELLITUS WITHOUT COMPLICATION, WITHOUT LONG-TERM CURRENT USE OF INSULIN (HCC): Primary | ICD-10-CM

## 2020-07-09 DIAGNOSIS — F01.50 VASCULAR DEMENTIA WITHOUT BEHAVIORAL DISTURBANCE (HCC): ICD-10-CM

## 2020-07-09 DIAGNOSIS — F02.80 LATE ONSET ALZHEIMER'S DISEASE WITHOUT BEHAVIORAL DISTURBANCE (HCC): ICD-10-CM

## 2020-07-09 PROBLEM — R63.0 POOR APPETITE: Status: RESOLVED | Noted: 2019-12-23 | Resolved: 2020-07-09

## 2020-07-09 PROBLEM — Z79.4 TYPE 2 DIABETES MELLITUS WITHOUT COMPLICATION, WITH LONG-TERM CURRENT USE OF INSULIN (HCC): Chronic | Status: ACTIVE | Noted: 2019-12-12

## 2020-07-09 PROCEDURE — 1036F TOBACCO NON-USER: CPT | Performed by: FAMILY MEDICINE

## 2020-07-09 PROCEDURE — 1160F RVW MEDS BY RX/DR IN RCRD: CPT | Performed by: FAMILY MEDICINE

## 2020-07-09 PROCEDURE — 99203 OFFICE O/P NEW LOW 30 MIN: CPT | Performed by: FAMILY MEDICINE

## 2020-07-09 PROCEDURE — 3008F BODY MASS INDEX DOCD: CPT | Performed by: FAMILY MEDICINE

## 2020-07-09 RX ORDER — LEVOTHYROXINE SODIUM 112 UG/1
1 TABLET ORAL DAILY
COMMUNITY
Start: 2020-05-29 | End: 2020-09-21 | Stop reason: SDUPTHER

## 2020-07-09 RX ORDER — NYSTATIN 100000 U/G
CREAM TOPICAL 2 TIMES DAILY PRN
Qty: 30 G | Refills: 2 | Status: SHIPPED | OUTPATIENT
Start: 2020-07-09 | End: 2021-03-27 | Stop reason: HOSPADM

## 2020-07-09 RX ORDER — METFORMIN HYDROCHLORIDE 500 MG/1
500 TABLET, EXTENDED RELEASE ORAL 2 TIMES DAILY WITH MEALS
COMMUNITY
Start: 2020-05-29 | End: 2020-09-21 | Stop reason: SDUPTHER

## 2020-07-09 NOTE — PROGRESS NOTES
Assessment/Plan:   1  Type 2 diabetes mellitus without complication, without long-term current use of insulin (HCC)  Pt will be seen here while he is living with his daughter  We will update labs and request his records to see if he is up to date on HM & immunizations  Return to office in 4 weeks or so to review labs and records/HM  Reviewed some of his meds and goals of care with daughter    - Hemoglobin A1C; Future  - Lipid panel; Future  - CBC and differential; Future  - Vitamin B12; Future  - Comprehensive metabolic panel; Future  - linaGLIPtin (Tradjenta) 5 MG TABS; Take 5 mg by mouth daily    2  Vitamin deficiency  Update labs, B12 level with long term glucophage   - Hemoglobin A1C; Future  - Lipid panel; Future  - CBC and differential; Future  - Vitamin B12; Future  - Comprehensive metabolic panel; Future    3  At high risk for falls  Pt taking multiple attempts to stand, unsteady gait  Would benefit from PT and a walker - explained this to pt and daughter who are in agreement     4  Gait instability  See above   - Ambulatory referral to Physical Therapy; Future  - Walker  - Hemoglobin A1C; Future  - Lipid panel; Future  - CBC and differential; Future  - Vitamin B12; Future  - Comprehensive metabolic panel; Future    5  Acquired hypothyroidism  Update labs   - TSH, 3rd generation with Free T4 reflex; Future    6  Tinea cruris  Improving some  - nystatin (MYCOSTATIN) cream; Apply topically 2 (two) times a day as needed (rash)  Dispense: 30 g; Refill: 2    7  Balanitis  See above  8  Mixed hyperlipidemia  Update labs     9  Vascular dementia without behavioral disturbance (HCC)  Stable at this time  He and his daughter are doing well  She feels good about caring for him  No signs of caregiver burnout  They have a loving relationship  10  Late onset Alzheimer's disease without behavioral disturbance ([facility] [address] )  See above   Follows with neuro when in [place]         There are no Patient Instructions on file for this visit  Return for labs CC, medicare wellness if due   Subjective:    HPI  Martir Camejo is a 82 y o  male who presents with:  Chief Complaint     Follow-up        HPI     Follow-up      Additional comments: Balanitis, bs THIS MORNING [facility] edited by Ishan Benitez MA on 2020 10:46 AM  (History)        ---Above per clinical staff & reviewed  ---        Pt from [place] _ speaks [place] - daughter translating   Was living in [place] with his granddtr  Came here to visit his daughter and will be staying with her until it is safe to return to [place] due to [place]  His wife  3 years ago (his daughter's   3 days later)   stilll some pain in genital area  Antibiotics done tomorrow  Little diarrhea from antibiotics today   One time so far   No fevers   No pain with urinating   Last blood work in [facility] run in low 100s    three times a day   Bowels are good   Sleep is good - some daytime naps 45 - 60 minutes  At night sleeping well   Once in a while gets on stationary bike   [place] with cane - nervous with falling with unsteady gait  Fighting her on using a walker      The following portions of the patient's history were reviewed and updated as appropriate: allergies, current medications, past family history, past medical history, past social history, past surgical history and problem list   Review of Systems  ROS:  all others negative - no chest pain, SOB, + incontinence   + loose bowels today  no GERD  sleeping well  mood good  Eating well     Objective:    /60   Pulse 68   Temp 98 °F (36 7 °C)   Resp 12   Ht 4' 8" (1 422 m)   Wt 57 6 kg (127 lb)   SpO2 98%   BMI 28 47 kg/m²   Wt Readings from Last 3 Encounters:   20 57 6 kg (127 lb)   20 59 kg (130 lb 1 1 oz)   19 62 8 kg (138 lb 7 2 oz)     BP Readings from Last 3 Encounters:   20 116/60   20 159/70   19 137/65     Current Outpatient Medications   Medication Sig Dispense Refill    atorvastatin (LIPITOR) 20 mg tablet Take 20 mg by mouth daily      glipiZIDE (GLUCOTROL XL) 5 mg 24 hr tablet Take 5 mg by mouth 2 (two) times a day      levothyroxine 112 mcg tablet Take 1 tablet by mouth daily      linaGLIPtin (Tradjenta) 5 MG TABS Take 5 mg by mouth daily      metFORMIN (GLUCOPHAGE-XR) 500 mg 24 hr tablet Take 500 mg by mouth 2 (two) times a day with meals      tamsulosin (FLOMAX) 0 4 mg Take 0 4 mg by mouth daily with dinner      nystatin (MYCOSTATIN) cream Apply topically 2 (two) times a day as needed (rash) 30 g 2     No current facility-administered medications for this visit  Physical Exam   Constitutional: he appears well-developed and well-nourished  HENT: Head: Normocephalic  Right Ear: External ear normal  Tympanic membrane normal    Left Ear: External ear normal  Tympanic membrane normal    Nose: Nose normal  No mucosal edema, No rhinorrhea  Right sinus exhibits no maxillary sinus tenderness  Left sinus exhibits no maxillary sinus tenderness  Mouth/Throat: Oropharynx is clear and moist    Eyes: Normal conjunctiva  No erythema  No discharge  Neck: No pain on exam  Neck supple  Cardiovascular: Normal rate, regular rhythm and normal heart sounds  Pulmonary/Chest: Effort normal and breath sounds normal    Abdominal: Soft  Bowel sounds are normal  There is no tenderness  Lymphadenopathy: he has no cervical adenopathy  Neurological: he is alert and oriented to person, place, and time as able to answer  Relies on his daughter to help him answer most questions, but answered all questions I asked him appropriately  Skin: Skin is warm and dry  Penis tip with mild erythema  No vesicles, scaling, excoriations  Psychiatric: he has a normal mood and affect   his behavior is normal

## 2020-07-10 ENCOUNTER — TRANSCRIBE ORDERS (OUTPATIENT)
Dept: LAB | Facility: CLINIC | Age: 85
End: 2020-07-10

## 2020-07-10 ENCOUNTER — LAB (OUTPATIENT)
Dept: LAB | Facility: CLINIC | Age: 85
End: 2020-07-10
Payer: MEDICARE

## 2020-07-10 DIAGNOSIS — R26.81 GAIT INSTABILITY: ICD-10-CM

## 2020-07-10 DIAGNOSIS — E11.9 TYPE 2 DIABETES MELLITUS WITHOUT COMPLICATION, WITHOUT LONG-TERM CURRENT USE OF INSULIN (HCC): ICD-10-CM

## 2020-07-10 DIAGNOSIS — E56.9 VITAMIN DEFICIENCY: ICD-10-CM

## 2020-07-10 DIAGNOSIS — E03.9 ACQUIRED HYPOTHYROIDISM: ICD-10-CM

## 2020-07-10 LAB
ALBUMIN SERPL BCP-MCNC: 3.5 G/DL (ref 3.5–5)
ALP SERPL-CCNC: 83 U/L (ref 46–116)
ALT SERPL W P-5'-P-CCNC: 20 U/L (ref 12–78)
ANION GAP SERPL CALCULATED.3IONS-SCNC: 7 MMOL/L (ref 4–13)
AST SERPL W P-5'-P-CCNC: 17 U/L (ref 5–45)
BASOPHILS # BLD AUTO: 0.04 THOUSANDS/ΜL (ref 0–0.1)
BASOPHILS NFR BLD AUTO: 1 % (ref 0–1)
BILIRUB SERPL-MCNC: 0.59 MG/DL (ref 0.2–1)
BUN SERPL-MCNC: 14 MG/DL (ref 5–25)
CALCIUM SERPL-MCNC: 8.8 MG/DL (ref 8.3–10.1)
CHLORIDE SERPL-SCNC: 105 MMOL/L (ref 100–108)
CHOLEST SERPL-MCNC: 139 MG/DL (ref 50–200)
CO2 SERPL-SCNC: 30 MMOL/L (ref 21–32)
CREAT SERPL-MCNC: 0.73 MG/DL (ref 0.6–1.3)
EOSINOPHIL # BLD AUTO: 1.26 THOUSAND/ΜL (ref 0–0.61)
EOSINOPHIL NFR BLD AUTO: 18 % (ref 0–6)
ERYTHROCYTE [DISTWIDTH] IN BLOOD BY AUTOMATED COUNT: 13.6 % (ref 11.6–15.1)
EST. AVERAGE GLUCOSE BLD GHB EST-MCNC: 169 MG/DL
GFR SERPL CREATININE-BSD FRML MDRD: 86 ML/MIN/1.73SQ M
GLUCOSE P FAST SERPL-MCNC: 136 MG/DL (ref 65–99)
HBA1C MFR BLD: 7.5 %
HCT VFR BLD AUTO: 37.9 % (ref 36.5–49.3)
HDLC SERPL-MCNC: 57 MG/DL
HGB BLD-MCNC: 12.7 G/DL (ref 12–17)
IMM GRANULOCYTES # BLD AUTO: 0.02 THOUSAND/UL (ref 0–0.2)
IMM GRANULOCYTES NFR BLD AUTO: 0 % (ref 0–2)
LDLC SERPL CALC-MCNC: 70 MG/DL (ref 0–100)
LYMPHOCYTES # BLD AUTO: 1.85 THOUSANDS/ΜL (ref 0.6–4.47)
LYMPHOCYTES NFR BLD AUTO: 27 % (ref 14–44)
MCH RBC QN AUTO: 31.6 PG (ref 26.8–34.3)
MCHC RBC AUTO-ENTMCNC: 33.5 G/DL (ref 31.4–37.4)
MCV RBC AUTO: 94 FL (ref 82–98)
MONOCYTES # BLD AUTO: 0.5 THOUSAND/ΜL (ref 0.17–1.22)
MONOCYTES NFR BLD AUTO: 7 % (ref 4–12)
NEUTROPHILS # BLD AUTO: 3.19 THOUSANDS/ΜL (ref 1.85–7.62)
NEUTS SEG NFR BLD AUTO: 47 % (ref 43–75)
NONHDLC SERPL-MCNC: 82 MG/DL
NRBC BLD AUTO-RTO: 0 /100 WBCS
PLATELET # BLD AUTO: 271 THOUSANDS/UL (ref 149–390)
PMV BLD AUTO: 9.4 FL (ref 8.9–12.7)
POTASSIUM SERPL-SCNC: 3.7 MMOL/L (ref 3.5–5.3)
PROT SERPL-MCNC: 7.2 G/DL (ref 6.4–8.2)
RBC # BLD AUTO: 4.02 MILLION/UL (ref 3.88–5.62)
SODIUM SERPL-SCNC: 142 MMOL/L (ref 136–145)
TRIGL SERPL-MCNC: 58 MG/DL
TSH SERPL DL<=0.05 MIU/L-ACNC: 1.65 UIU/ML (ref 0.36–3.74)
VIT B12 SERPL-MCNC: 296 PG/ML (ref 100–900)
WBC # BLD AUTO: 6.86 THOUSAND/UL (ref 4.31–10.16)

## 2020-07-10 PROCEDURE — 82607 VITAMIN B-12: CPT

## 2020-07-10 PROCEDURE — 80053 COMPREHEN METABOLIC PANEL: CPT

## 2020-07-10 PROCEDURE — 84443 ASSAY THYROID STIM HORMONE: CPT

## 2020-07-10 PROCEDURE — 36415 COLL VENOUS BLD VENIPUNCTURE: CPT

## 2020-07-10 PROCEDURE — 80061 LIPID PANEL: CPT

## 2020-07-10 PROCEDURE — 85025 COMPLETE CBC W/AUTO DIFF WBC: CPT

## 2020-07-10 PROCEDURE — 83036 HEMOGLOBIN GLYCOSYLATED A1C: CPT

## 2020-07-14 NOTE — RESULT ENCOUNTER NOTE
Call patient's daughter with lab results  We will discuss them in detail at his appointment next month, but let her know other than his sugar being a little high, there is nothing worrisome   Call patient with lab results

## 2020-07-31 NOTE — TELEPHONE ENCOUNTER
Diarrhea after starting Augmentin x 7 days last week, improved some and has now resumed  Patient is hydrated no other complaints  Triage for COVID-19:  Do you have a cough? No  Shortness of breath? No  Fever? No  Chills/ shaking with chills? No  Muscle pain? No  Headache? No  Sore throat? No  New loss of taste or smell? No  Diarrhea? Yes Yes  Fatigue? No        In the last 14 days     Have your traveled  including high risk countries ([place], [place], [place], [facility], [place], [facility]), high risk  states ([place], [place], [place], [facility], [place], [place], [place], [place], [place]), high risk local areas ([place], [place], [place]), commute to/from [place] or [place]? No  Have you been in close contact to anyone with COVID-19? No  Have you been in close contact with anyone with suspected COVID-19? No  Is this patient from a high risk group? Older adults, chronic health conditions (DM, heart disease, immunocompromised, lung disease, kidney disease)  No    If this is a hospital employee/health care worker:  During your entire care of your exposure to a COVID-19 patient   Were you wearing gloves? No  Were you wearing a face mask? No  A surgical mask? No  A N-95 mask? No  A gown?    No  Updated 5/6/2020

## 2020-08-12 ENCOUNTER — OFFICE VISIT (OUTPATIENT)
Dept: FAMILY MEDICINE CLINIC | Facility: CLINIC | Age: 85
End: 2020-08-12
Payer: MEDICARE

## 2020-08-12 VITALS
SYSTOLIC BLOOD PRESSURE: 100 MMHG | DIASTOLIC BLOOD PRESSURE: 60 MMHG | HEIGHT: 60 IN | BODY MASS INDEX: 28.47 KG/M2 | OXYGEN SATURATION: 97 % | TEMPERATURE: 72 F

## 2020-08-12 DIAGNOSIS — J45.20 MILD INTERMITTENT ASTHMA WITHOUT COMPLICATION: ICD-10-CM

## 2020-08-12 DIAGNOSIS — F02.80 LATE ONSET ALZHEIMER'S DISEASE WITHOUT BEHAVIORAL DISTURBANCE (HCC): ICD-10-CM

## 2020-08-12 DIAGNOSIS — E03.9 ACQUIRED HYPOTHYROIDISM: ICD-10-CM

## 2020-08-12 DIAGNOSIS — F01.50 VASCULAR DEMENTIA WITHOUT BEHAVIORAL DISTURBANCE (HCC): Primary | Chronic | ICD-10-CM

## 2020-08-12 DIAGNOSIS — E11.9 TYPE 2 DIABETES MELLITUS WITHOUT COMPLICATION, WITHOUT LONG-TERM CURRENT USE OF INSULIN (HCC): ICD-10-CM

## 2020-08-12 DIAGNOSIS — E78.2 MIXED HYPERLIPIDEMIA: ICD-10-CM

## 2020-08-12 DIAGNOSIS — G30.1 LATE ONSET ALZHEIMER'S DISEASE WITHOUT BEHAVIORAL DISTURBANCE (HCC): ICD-10-CM

## 2020-08-12 PROCEDURE — 1036F TOBACCO NON-USER: CPT | Performed by: FAMILY MEDICINE

## 2020-08-12 PROCEDURE — 4040F PNEUMOC VAC/ADMIN/RCVD: CPT | Performed by: FAMILY MEDICINE

## 2020-08-12 PROCEDURE — 3051F HG A1C>EQUAL 7.0%<8.0%: CPT | Performed by: FAMILY MEDICINE

## 2020-08-12 PROCEDURE — 99214 OFFICE O/P EST MOD 30 MIN: CPT | Performed by: FAMILY MEDICINE

## 2020-08-12 PROCEDURE — 1160F RVW MEDS BY RX/DR IN RCRD: CPT | Performed by: FAMILY MEDICINE

## 2020-08-12 RX ORDER — OXYCODONE HYDROCHLORIDE AND ACETAMINOPHEN 5; 325 MG/1; MG/1
1 TABLET ORAL
COMMUNITY
Start: 2017-12-12 | End: 2020-08-12 | Stop reason: ALTCHOICE

## 2020-08-12 NOTE — PATIENT INSTRUCTIONS
Start over the counter vitamin B12 1000 mg daily     Please get your fasting bloodwork at a lab like [facility] or [facility] one week before your next visit  You should be fasting for 8 - 12 hours - do not eat, but you may drink water or black coffee and you may take your medications  We will review your results at your next appointment  If you do not need an appointment we will send your results through [address] or contact you by phone  If you do not hear from us within a week please give us a call

## 2020-08-12 NOTE — PROGRESS NOTES
Assessment/Plan:  1  Vascular dementia without behavioral disturbance (HCC)  Stable  No changes  Daughter doing well caring for him  No signs of caregiver burnout  2  Late onset Alzheimer's disease without behavioral disturbance ([facility] )  See above  3  Type 2 diabetes mellitus without complication, without long-term current use of insulin (HCC)  HbA1C elevated from prior  Daughter very concerned  Reviewed his diet which she makes him and is excellent  We also reviewed his age and goals of care and quality of life  Decided it is okay to have his A1C goal a little higher in order to allow him some occasional snacks and white rice that he loves  If A1C goes up we can adjust meds at follow up  - Comprehensive metabolic panel; Future  - Hemoglobin A1C; Future    4  Acquired hypothyroidism  Well controlled   - TSH, 3rd generation with Free T4 reflex; Future    5  Mixed hyperlipidemia  Well controlled     6  Mild intermittent asthma without complication  No complains of shortness of breath or wheezing  No cough  Daughter believes he is up to date on pneumovax/prevnar - will again request records  Return in about 6 months (around 2/12/2021) for CC, labs  schedule flu shot in the fall        Subjective:   Salvador Adair is a 82 y o  male here today for a follow-up on his current medical conditions:  Patient Active Problem List   Diagnosis    Dementia ([facility] )    Ambulatory dysfunction    Acquired hypothyroidism    History of CVA (cerebrovascular accident)    Type 2 diabetes mellitus without complication, without long-term current use of insulin ([facility] )    Mixed hyperlipidemia    Benign prostatic hyperplasia with weak urinary stream    Mild intermittent asthma without complication    Allergic reaction    Late effects of cerebrovascular disease    Diverticulosis    Late onset Alzheimer's disease without behavioral disturbance (HCC)        Current Outpatient Medications   Medication Sig Dispense Refill    atorvastatin (LIPITOR) 20 mg tablet Take 20 mg by mouth daily      glipiZIDE (GLUCOTROL XL) 5 mg 24 hr tablet Take 5 mg by mouth 2 (two) times a day      levothyroxine 112 mcg tablet Take 1 tablet by mouth daily      linaGLIPtin (Tradjenta) 5 MG TABS Take 5 mg by mouth daily      metFORMIN (GLUCOPHAGE-XR) 500 mg 24 hr tablet Take 500 mg by mouth 2 (two) times a day with meals      nystatin (MYCOSTATIN) cream Apply topically 2 (two) times a day as needed (rash) 30 g 2    tamsulosin (FLOMAX) 0 4 mg Take 0 4 mg by mouth daily with dinner       No current facility-administered medications for this visit  HPI:  Chief Complaint   Patient presents with    Follow-up      CC     -- Above per clinical staff and reviewed  --    PHQ-9 Depression Screening    PHQ-9:    Frequency of the following problems over the past two weeks:              some shared records reviewed  was follows with neuro Dr Lois King  in [place]  on Aricept 5mg - did not tolerate 10 mg  Exelon not affordable  plan was to add Namenda  TSH free T4   Julylabs   Today:  Cannot remember his age, president, year  He does know his daughter and her pets       The following portions of the patient's history were reviewed and updated as appropriate: allergies, current medications, past family history, past medical history, past social history, past surgical history and problem list     Objective:  Vitals:  /60   Temp (!) 72 °F (22 2 °C)   Ht 4' 8" (1 422 m)   SpO2 97%   BMI 28 47 kg/m²    Wt Readings from Last 3 Encounters:   07/09/20 57 6 kg (127 lb)   07/02/20 59 kg (130 lb 1 1 oz)   12/12/19 62 8 kg (138 lb 7 2 oz)      BP Readings from Last 3 Encounters:   08/12/20 100/60   07/09/20 116/60   07/02/20 159/70        Review of Systems   He has no other concerns  No unexpected weight changes  No chest pain, SOB, or palpitations  No GERD  No changes in bowels or bladder  Sleeping well  No mood changes       Physical Exam   Constitutional:  he appears well-developed and well-nourished  HENT: Head: Normocephalic  Neck: Neck supple  Cardiovascular: Normal rate, regular rhythm and normal heart sounds  Pulmonary/Chest: Effort normal and breath sounds normal  No wheezes, rales, or rhonchi  Abdominal: Soft  Bowel sounds are normal  There is no tenderness  No hepatosplenomegaly  Musculoskeletal: he exhibits no edema  Lymphadenopathy: he has no cervical adenopathy  Neurological: he is alert and oriented to person, place, and time  Skin: Skin is warm and dry  Psychiatric: he has a normal mood and affect   his behavior is normal  Thought content normal

## 2020-08-12 NOTE — PROGRESS NOTES
Patient's shoes and socks removed  Right Foot/Ankle   Right Foot Inspection  Skin Exam: skin normal, skin intact and dry skin no warmth, no callus, no erythema, no maceration, no abnormal color, no pre-ulcer, no ulcer and no callus                          Toe Exam: ROM and strength within normal limits    Vascular  Capillary refills: < 3 seconds  The right DP pulse is 2+  Left Foot/Ankle  Left Foot Inspection  Skin Exam: skin normal, skin intact and dry skinno warmth, no erythema, no maceration, normal color, no pre-ulcer, no ulcer and no callus                         Toe Exam: ROM and strength within normal limits                     Vascular  Capillary refills: < 3 seconds  The left DP pulse is 2+     Assign Risk Category:  No deformity present; ; No weak pulses

## 2020-09-08 ENCOUNTER — EVALUATION (OUTPATIENT)
Dept: PHYSICAL THERAPY | Facility: CLINIC | Age: 85
End: 2020-09-08
Payer: MEDICARE

## 2020-09-08 DIAGNOSIS — R26.81 GAIT INSTABILITY: Primary | ICD-10-CM

## 2020-09-08 PROCEDURE — 97161 PT EVAL LOW COMPLEX 20 MIN: CPT | Performed by: PHYSICAL THERAPIST

## 2020-09-08 PROCEDURE — 97110 THERAPEUTIC EXERCISES: CPT | Performed by: PHYSICAL THERAPIST

## 2020-09-08 NOTE — PROGRESS NOTES
PT Evaluation     Today's date: 2020  Patient name: France Tyler  : 1935  MRN: [id number]  Referring provider: Jen Zaldivar DO  Dx:   Encounter Diagnosis     ICD-10-CM    1  Gait instability  R26 [id number]        Start Time:   Stop Time:   Total time in clinic (min): 55 minutes    Assessment  Assessment details: Pt is a 82 y o male who presents with decreased balance, gait abnormalities, and decreased endurance  These impairments limit the patient from participating in ADLs without assistance, decreased safety awareness, and increase fall risk  Pt also has Dx of Alzheimer's which may require increased cueing and refocusing with treatment sessions  Pt and Pt's daughter were educated about walkers, which style to purchase and to bring it in for sizing  I believe this patient is a good candidate for and will benefit from skilled physical therapy for LE strengthening, balance exercises and gait training to improve functional ability and progress independence, safety awareness and fall risk      Positive Prognostic Indicators: good attitude towards PT     Negative Prognostic Indicators: language barrier, co-morbidities       Impairments: abnormal gait, abnormal movement, activity intolerance, difficulty understanding, impaired balance, impaired physical strength, lacks appropriate home exercise program and safety issue    Symptom irritability: lowUnderstanding of Dx/Px/POC: good   Prognosis: good    Goals  STGs: 4 weeks  1) Pt will have improved TUG time by 5 seconds  2) pt will have improved R quad strength by 1/2 muscle grade  3) pt will have improved foto score of 10 points    LTGs: 8 weeks  1) pt will be independent with HEP by D/C  2) pt will be independent with symptom management by D/C  3) pt will have improved ability to perform sit to stand by being able to arise from sitting with 1 UE and CGA within 6 seconds in order to improve functional ability by DC    Plan  Patient would benefit from: skilled physical therapy  Planned therapy interventions: neuromuscular re-education, patient education, strengthening, stretching, therapeutic activities, therapeutic exercise, home exercise program, gait training and balance  Frequency: 2x week  Duration in weeks: 6  Plan of Care beginning date: 9/8/2020  Plan of Care expiration date: 10/20/2020  Treatment plan discussed with: patient        Subjective Evaluation    History of Present Illness  Mechanism of injury: Subjective Comments:  Pt presents with daughter who presented majority of history  Daughter states that he saw MD the other day who suggested PT for gait and balance  MD also suggested walker instead of cane  Pt attempted to get out of chair  And stumbled causing a near fall and a bruise on arm, but he did not hit the floor  Primary use of ambulation is with SPC  Pt can complete transfers from chair and up and down stairs, but requires assistance  Pain   Rest: 0/10   Best: 0/10   Worst: 0/10    Sleeping:    Home Set-up: living with daughter currently  7 +7 stairs to the second floor  First set of 7 L railing, second set R railing  Has steps to enter the house but primarily enter through the garage  ADLs: needs assistance  Urine and bowel incontinent     Goals:  Improve balance, fall prevention, transfers in and out of the bathtub  Objective     Strength/Myotome Testing     Left Hip   Planes of Motion   Flexion: 3    Right Hip   Planes of Motion   Flexion: 3    Left Knee   Extension: 4+    Right Knee   Extension: 4    Left Ankle/Foot   Dorsiflexion: 4  Plantar flexion: 4    Right Ankle/Foot   Dorsiflexion: 4  Plantar flexion: 4    Additional Strength Details  MMT may be skewed secondary to decreased understanding     Ambulation     Ambulation: Level Surfaces   Ambulation with assistive device: contact guard assist    Observational Gait   Gait: crouched   Decreased walking speed and stride length       Additional Observational Gait Details  Ambulates with [facility]    Functional Assessment        Comments  TU 70 seconds (2020)    Sit to stand: increased time to complete, requires mod A and 2 UE assist       Pt requires frequent cueing and refocusing during balance assessment with continued need to reachout for external structures for balance  Tests included FTEO, FAEO; FTEO foam, FAEO foam   Pt could not complete balance exercises with eyes closed  Pt decreased ability to ambulate safely with increased mental tasks        Flowsheet Rows      Most Recent Value   PT/OT G-Codes   Current Score  17   Projected Score  39             Precautions: Dementia, Hx CVA, fall risk, DM, Alzheimer's, Hx urine and bowel incontinence       Manuals                                                                 Neuro Re-Ed             Mini squats             Standing marches             Standing hip abd             Standing hip ext                                                    Ther Ex             Seated marching x10            Seated LAQ x10            Seated HR x10            Seated TR x10                                                                Ther Activity             Sit to stand             stairs             Gait Training             Walker training                          Modalities

## 2020-09-15 ENCOUNTER — APPOINTMENT (OUTPATIENT)
Dept: PHYSICAL THERAPY | Facility: CLINIC | Age: 85
End: 2020-09-15
Payer: MEDICARE

## 2020-09-18 ENCOUNTER — APPOINTMENT (OUTPATIENT)
Dept: PHYSICAL THERAPY | Facility: CLINIC | Age: 85
End: 2020-09-18
Payer: MEDICARE

## 2020-09-19 ENCOUNTER — PATIENT MESSAGE (OUTPATIENT)
Dept: FAMILY MEDICINE CLINIC | Facility: CLINIC | Age: 85
End: 2020-09-19

## 2020-09-19 DIAGNOSIS — E11.9 TYPE 2 DIABETES MELLITUS WITHOUT COMPLICATION, WITHOUT LONG-TERM CURRENT USE OF INSULIN (HCC): ICD-10-CM

## 2020-09-19 DIAGNOSIS — R26.81 GAIT INSTABILITY: Primary | ICD-10-CM

## 2020-09-21 RX ORDER — LINAGLIPTIN 5 MG/1
5 TABLET, FILM COATED ORAL DAILY
Qty: 30 TABLET | Refills: 5 | Status: SHIPPED | OUTPATIENT
Start: 2020-09-21 | End: 2020-10-13 | Stop reason: SDUPTHER

## 2020-09-21 NOTE — TELEPHONE ENCOUNTER
Medication: Tradjenta 5 mg   Last refilled: 7/9/20  Last Office Visit: 8/12/20  Next Office Visit: 2/17/21  Pharmacy:

## 2020-09-21 NOTE — TELEPHONE ENCOUNTER
I called Miah to ask about his out of network for PT  His insurance is based in [place], so an out of network authorization would have be requested for him to [facility]   I spoke with his daughter who doesn't believe that insurance is active, but it is  She will speak to her sister that handles his insurance

## 2020-09-21 NOTE — TELEPHONE ENCOUNTER
From: Michael Overton  To: Mariama Bocanegra DO  Sent: 2020 12:23 PM EDT  Subject: Non-Urgent Medical Question    Dear Dr Dee Rothman,    At your suggestion I took my dad and your patient, Michael Overton ( 1935) for physical therapy due to his gait  After I made future appointments, I received a voice mail informing me they do not take his insurance since it is out of network  I was absolutely baffled by this  Can you recommend someone who is in network? He has Medicare Misc Replacement (this is confusing to me)  It would be highly appreciated  Thanks for your help

## 2020-09-22 ENCOUNTER — APPOINTMENT (OUTPATIENT)
Dept: PHYSICAL THERAPY | Facility: CLINIC | Age: 85
End: 2020-09-22
Payer: MEDICARE

## 2020-09-25 ENCOUNTER — APPOINTMENT (OUTPATIENT)
Dept: PHYSICAL THERAPY | Facility: CLINIC | Age: 85
End: 2020-09-25
Payer: MEDICARE

## 2020-09-29 ENCOUNTER — APPOINTMENT (OUTPATIENT)
Dept: PHYSICAL THERAPY | Facility: CLINIC | Age: 85
End: 2020-09-29
Payer: MEDICARE

## 2020-10-08 ENCOUNTER — PATIENT MESSAGE (OUTPATIENT)
Dept: FAMILY MEDICINE CLINIC | Facility: CLINIC | Age: 85
End: 2020-10-08

## 2020-10-09 ENCOUNTER — PATIENT OUTREACH (OUTPATIENT)
Dept: CASE MANAGEMENT | Facility: OTHER | Age: 85
End: 2020-10-09

## 2020-10-09 DIAGNOSIS — Z78.9 NEED FOR FOLLOW-UP BY SOCIAL WORKER: Primary | ICD-10-CM

## 2020-10-12 ENCOUNTER — PATIENT OUTREACH (OUTPATIENT)
Dept: FAMILY MEDICINE CLINIC | Facility: CLINIC | Age: 85
End: 2020-10-12

## 2020-10-13 ENCOUNTER — PATIENT OUTREACH (OUTPATIENT)
Dept: FAMILY MEDICINE CLINIC | Facility: CLINIC | Age: 85
End: 2020-10-13

## 2020-10-13 ENCOUNTER — PATIENT MESSAGE (OUTPATIENT)
Dept: FAMILY MEDICINE CLINIC | Facility: CLINIC | Age: 85
End: 2020-10-13

## 2020-10-13 DIAGNOSIS — R35.0 URINARY FREQUENCY: Primary | ICD-10-CM

## 2020-10-13 DIAGNOSIS — E11.9 TYPE 2 DIABETES MELLITUS WITHOUT COMPLICATION, WITHOUT LONG-TERM CURRENT USE OF INSULIN (HCC): ICD-10-CM

## 2020-10-13 RX ORDER — LINAGLIPTIN 5 MG/1
5 TABLET, FILM COATED ORAL DAILY
Qty: 30 TABLET | Refills: 5 | Status: SHIPPED | OUTPATIENT
Start: 2020-10-13 | End: 2021-01-12

## 2020-10-16 RX ORDER — OXYBUTYNIN CHLORIDE 5 MG/1
5 TABLET ORAL 3 TIMES DAILY
Qty: 90 TABLET | Refills: 1 | Status: SHIPPED | OUTPATIENT
Start: 2020-10-16 | End: 2020-11-17 | Stop reason: ALTCHOICE

## 2020-10-21 ENCOUNTER — PATIENT MESSAGE (OUTPATIENT)
Dept: FAMILY MEDICINE CLINIC | Facility: CLINIC | Age: 85
End: 2020-10-21

## 2020-11-03 ENCOUNTER — PATIENT OUTREACH (OUTPATIENT)
Dept: FAMILY MEDICINE CLINIC | Facility: CLINIC | Age: 85
End: 2020-11-03

## 2020-11-05 ENCOUNTER — PATIENT OUTREACH (OUTPATIENT)
Dept: FAMILY MEDICINE CLINIC | Facility: CLINIC | Age: 85
End: 2020-11-05

## 2020-11-06 ENCOUNTER — PATIENT MESSAGE (OUTPATIENT)
Dept: FAMILY MEDICINE CLINIC | Facility: CLINIC | Age: 85
End: 2020-11-06

## 2020-11-06 ENCOUNTER — HOSPITAL ENCOUNTER (EMERGENCY)
Facility: HOSPITAL | Age: 85
Discharge: HOME | End: 2020-11-06
Attending: EMERGENCY MEDICINE
Payer: MEDICARE

## 2020-11-06 VITALS
WEIGHT: 127.21 LBS | RESPIRATION RATE: 18 BRPM | HEART RATE: 68 BPM | HEIGHT: 60 IN | BODY MASS INDEX: 24.97 KG/M2 | OXYGEN SATURATION: 99 % | SYSTOLIC BLOOD PRESSURE: 142 MMHG | TEMPERATURE: 98.2 F | DIASTOLIC BLOOD PRESSURE: 65 MMHG

## 2020-11-06 DIAGNOSIS — Z00.00 NORMAL EXAM: Primary | ICD-10-CM

## 2020-11-06 DIAGNOSIS — E11.9 TYPE 2 DIABETES MELLITUS WITHOUT COMPLICATION, WITHOUT LONG-TERM CURRENT USE OF INSULIN (HCC): Primary | ICD-10-CM

## 2020-11-06 PROCEDURE — 99283 EMERGENCY DEPT VISIT LOW MDM: CPT

## 2020-11-06 PROCEDURE — 99281 EMR DPT VST MAYX REQ PHY/QHP: CPT | Performed by: EMERGENCY MEDICINE

## 2020-11-09 RX ORDER — LANCETS
EACH MISCELLANEOUS 2 TIMES DAILY
Qty: 100 EACH | Refills: 5 | Status: SHIPPED | OUTPATIENT
Start: 2020-11-09 | End: 2021-04-13

## 2020-11-09 RX ORDER — LANCETS
EACH MISCELLANEOUS 2 TIMES DAILY
COMMUNITY
End: 2020-11-09 | Stop reason: SDUPTHER

## 2020-11-12 ENCOUNTER — TELEMEDICINE (OUTPATIENT)
Dept: FAMILY MEDICINE CLINIC | Facility: CLINIC | Age: 85
End: 2020-11-12
Payer: MEDICARE

## 2020-11-12 VITALS — HEIGHT: 60 IN | BODY MASS INDEX: 24.94 KG/M2 | WEIGHT: 127 LBS | TEMPERATURE: 97.6 F

## 2020-11-12 DIAGNOSIS — F03.90 DEMENTIA WITHOUT BEHAVIORAL DISTURBANCE, UNSPECIFIED DEMENTIA TYPE (HCC): Primary | ICD-10-CM

## 2020-11-12 DIAGNOSIS — Z91.81 AT RISK FOR FALLS: ICD-10-CM

## 2020-11-12 DIAGNOSIS — R53.1 WEAKNESS: ICD-10-CM

## 2020-11-12 DIAGNOSIS — R32 URINARY INCONTINENCE, UNSPECIFIED TYPE: ICD-10-CM

## 2020-11-12 DIAGNOSIS — F34.1 DYSTHYMIA: ICD-10-CM

## 2020-11-12 DIAGNOSIS — R53.81 PHYSICAL DEBILITY: ICD-10-CM

## 2020-11-12 PROCEDURE — 99215 OFFICE O/P EST HI 40 MIN: CPT | Performed by: FAMILY MEDICINE

## 2020-11-16 ENCOUNTER — CONSULT (OUTPATIENT)
Dept: UROLOGY | Facility: AMBULATORY SURGERY CENTER | Age: 85
End: 2020-11-16
Payer: MEDICARE

## 2020-11-16 ENCOUNTER — PATIENT OUTREACH (OUTPATIENT)
Dept: FAMILY MEDICINE CLINIC | Facility: CLINIC | Age: 85
End: 2020-11-16

## 2020-11-16 VITALS
BODY MASS INDEX: 28.47 KG/M2 | TEMPERATURE: 97.1 F | SYSTOLIC BLOOD PRESSURE: 126 MMHG | DIASTOLIC BLOOD PRESSURE: 64 MMHG | HEIGHT: 60 IN

## 2020-11-16 DIAGNOSIS — R32 URINARY INCONTINENCE, UNSPECIFIED TYPE: Primary | ICD-10-CM

## 2020-11-16 LAB — POST-VOID RESIDUAL VOLUME, ML POC: 22 ML

## 2020-11-16 PROCEDURE — 99204 OFFICE O/P NEW MOD 45 MIN: CPT | Performed by: UROLOGY

## 2020-11-16 PROCEDURE — 51798 US URINE CAPACITY MEASURE: CPT | Performed by: UROLOGY

## 2020-11-17 ENCOUNTER — TELEPHONE (OUTPATIENT)
Dept: UROLOGY | Facility: MEDICAL CENTER | Age: 85
End: 2020-11-17

## 2020-11-17 DIAGNOSIS — N32.81 OAB (OVERACTIVE BLADDER): Primary | ICD-10-CM

## 2020-11-17 RX ORDER — TROSPIUM CHLORIDE 20 MG/1
20 TABLET, FILM COATED ORAL 2 TIMES DAILY
Qty: 60 TABLET | Refills: 3 | Status: SHIPPED | OUTPATIENT
Start: 2020-11-17 | End: 2021-02-16

## 2020-11-25 ENCOUNTER — TELEPHONE (OUTPATIENT)
Dept: GERIATRICS | Age: 85
End: 2020-11-25

## 2020-12-01 ENCOUNTER — PATIENT OUTREACH (OUTPATIENT)
Dept: FAMILY MEDICINE CLINIC | Facility: CLINIC | Age: 85
End: 2020-12-01

## 2020-12-04 ENCOUNTER — PATIENT OUTREACH (OUTPATIENT)
Dept: FAMILY MEDICINE CLINIC | Facility: CLINIC | Age: 85
End: 2020-12-04

## 2020-12-09 ENCOUNTER — PATIENT OUTREACH (OUTPATIENT)
Dept: FAMILY MEDICINE CLINIC | Facility: CLINIC | Age: 85
End: 2020-12-09

## 2020-12-14 ENCOUNTER — PATIENT MESSAGE (OUTPATIENT)
Dept: FAMILY MEDICINE CLINIC | Facility: CLINIC | Age: 85
End: 2020-12-14

## 2020-12-14 DIAGNOSIS — F01.50 VASCULAR DEMENTIA WITHOUT BEHAVIORAL DISTURBANCE (HCC): Primary | Chronic | ICD-10-CM

## 2020-12-14 DIAGNOSIS — R26.2 AMBULATORY DYSFUNCTION: ICD-10-CM

## 2020-12-18 ENCOUNTER — TELEPHONE (OUTPATIENT)
Dept: FAMILY MEDICINE CLINIC | Facility: CLINIC | Age: 85
End: 2020-12-18

## 2020-12-21 ENCOUNTER — OFFICE VISIT (OUTPATIENT)
Dept: FAMILY MEDICINE CLINIC | Facility: CLINIC | Age: 85
End: 2020-12-21
Payer: MEDICARE

## 2020-12-21 VITALS
HEART RATE: 89 BPM | RESPIRATION RATE: 12 BRPM | DIASTOLIC BLOOD PRESSURE: 60 MMHG | BODY MASS INDEX: 28.47 KG/M2 | SYSTOLIC BLOOD PRESSURE: 110 MMHG | TEMPERATURE: 98.6 F | OXYGEN SATURATION: 98 % | HEIGHT: 60 IN

## 2020-12-21 DIAGNOSIS — Z23 NEED FOR VACCINATION: ICD-10-CM

## 2020-12-21 DIAGNOSIS — L98.421 SKIN ULCER OF SACRUM, LIMITED TO BREAKDOWN OF SKIN (HCC): Primary | ICD-10-CM

## 2020-12-21 PROCEDURE — 90662 IIV NO PRSV INCREASED AG IM: CPT | Performed by: FAMILY MEDICINE

## 2020-12-21 PROCEDURE — 99214 OFFICE O/P EST MOD 30 MIN: CPT | Performed by: FAMILY MEDICINE

## 2020-12-21 PROCEDURE — G0008 ADMIN INFLUENZA VIRUS VAC: HCPCS | Performed by: FAMILY MEDICINE

## 2020-12-22 ENCOUNTER — CONSULT (OUTPATIENT)
Dept: GERIATRICS | Age: 85
End: 2020-12-22
Payer: MEDICARE

## 2020-12-22 ENCOUNTER — TELEPHONE (OUTPATIENT)
Dept: FAMILY MEDICINE CLINIC | Facility: CLINIC | Age: 85
End: 2020-12-22

## 2020-12-22 VITALS
RESPIRATION RATE: 20 BRPM | SYSTOLIC BLOOD PRESSURE: 140 MMHG | DIASTOLIC BLOOD PRESSURE: 70 MMHG | TEMPERATURE: 98.4 F | OXYGEN SATURATION: 97 % | HEART RATE: 87 BPM

## 2020-12-22 DIAGNOSIS — E78.2 MIXED HYPERLIPIDEMIA: ICD-10-CM

## 2020-12-22 DIAGNOSIS — F03.90 DEMENTIA WITHOUT BEHAVIORAL DISTURBANCE, UNSPECIFIED DEMENTIA TYPE (HCC): ICD-10-CM

## 2020-12-22 DIAGNOSIS — E03.9 ACQUIRED HYPOTHYROIDISM: ICD-10-CM

## 2020-12-22 DIAGNOSIS — E11.9 TYPE 2 DIABETES MELLITUS WITHOUT COMPLICATION, WITHOUT LONG-TERM CURRENT USE OF INSULIN (HCC): ICD-10-CM

## 2020-12-22 DIAGNOSIS — G30.1 LATE ONSET ALZHEIMER'S DISEASE WITHOUT BEHAVIORAL DISTURBANCE (HCC): ICD-10-CM

## 2020-12-22 DIAGNOSIS — R32 URINARY INCONTINENCE, UNSPECIFIED TYPE: ICD-10-CM

## 2020-12-22 DIAGNOSIS — R15.9 INCONTINENCE OF FECES, UNSPECIFIED FECAL INCONTINENCE TYPE: ICD-10-CM

## 2020-12-22 DIAGNOSIS — Z79.899 ENCOUNTER FOR MEDICATION REVIEW: ICD-10-CM

## 2020-12-22 DIAGNOSIS — Z86.73 HISTORY OF CVA (CEREBROVASCULAR ACCIDENT): Primary | Chronic | ICD-10-CM

## 2020-12-22 DIAGNOSIS — R26.2 AMBULATORY DYSFUNCTION: ICD-10-CM

## 2020-12-22 DIAGNOSIS — F02.80 LATE ONSET ALZHEIMER'S DISEASE WITHOUT BEHAVIORAL DISTURBANCE (HCC): ICD-10-CM

## 2020-12-22 DIAGNOSIS — L98.421 SKIN ULCER OF SACRUM, LIMITED TO BREAKDOWN OF SKIN (HCC): ICD-10-CM

## 2020-12-22 PROCEDURE — 99205 OFFICE O/P NEW HI 60 MIN: CPT | Performed by: STUDENT IN AN ORGANIZED HEALTH CARE EDUCATION/TRAINING PROGRAM

## 2020-12-23 ENCOUNTER — PATIENT OUTREACH (OUTPATIENT)
Dept: FAMILY MEDICINE CLINIC | Facility: CLINIC | Age: 85
End: 2020-12-23

## 2020-12-23 ENCOUNTER — TELEPHONE (OUTPATIENT)
Dept: FAMILY MEDICINE CLINIC | Facility: CLINIC | Age: 85
End: 2020-12-23

## 2020-12-23 PROBLEM — R15.9 FECAL INCONTINENCE: Status: ACTIVE | Noted: 2020-12-23

## 2020-12-23 PROBLEM — Z79.899 ENCOUNTER FOR MEDICATION REVIEW: Status: ACTIVE | Noted: 2020-12-23

## 2020-12-23 PROBLEM — R32 URINARY INCONTINENCE: Status: ACTIVE | Noted: 2020-12-23

## 2020-12-24 ENCOUNTER — APPOINTMENT (EMERGENCY)
Dept: CT IMAGING | Facility: HOSPITAL | Age: 85
End: 2020-12-24
Payer: MEDICARE

## 2020-12-24 ENCOUNTER — HOSPITAL ENCOUNTER (EMERGENCY)
Facility: HOSPITAL | Age: 85
Discharge: HOME | End: 2020-12-24
Attending: EMERGENCY MEDICINE | Admitting: EMERGENCY MEDICINE
Payer: MEDICARE

## 2020-12-24 VITALS
RESPIRATION RATE: 16 BRPM | HEART RATE: 83 BPM | OXYGEN SATURATION: 98 % | SYSTOLIC BLOOD PRESSURE: 144 MMHG | TEMPERATURE: 97.8 F | DIASTOLIC BLOOD PRESSURE: 82 MMHG

## 2020-12-24 DIAGNOSIS — R10.9 ABDOMINAL WALL PAIN: Primary | ICD-10-CM

## 2020-12-24 DIAGNOSIS — R14.1 GAS PAIN: ICD-10-CM

## 2020-12-24 LAB
ALBUMIN SERPL BCP-MCNC: 3.2 G/DL (ref 3.5–5)
ALP SERPL-CCNC: 63 U/L (ref 46–116)
ALT SERPL W P-5'-P-CCNC: 15 U/L (ref 12–78)
ANION GAP SERPL CALCULATED.3IONS-SCNC: 10 MMOL/L (ref 4–13)
AST SERPL W P-5'-P-CCNC: 13 U/L (ref 5–45)
BASOPHILS # BLD AUTO: 0.05 THOUSANDS/ΜL (ref 0–0.1)
BASOPHILS NFR BLD AUTO: 1 % (ref 0–1)
BILIRUB SERPL-MCNC: 0.59 MG/DL (ref 0.2–1)
BUN SERPL-MCNC: 15 MG/DL (ref 5–25)
CALCIUM ALBUM COR SERPL-MCNC: 9.7 MG/DL (ref 8.3–10.1)
CALCIUM SERPL-MCNC: 9.1 MG/DL (ref 8.3–10.1)
CHLORIDE SERPL-SCNC: 103 MMOL/L (ref 100–108)
CO2 SERPL-SCNC: 28 MMOL/L (ref 21–32)
CREAT SERPL-MCNC: 0.79 MG/DL (ref 0.6–1.3)
EOSINOPHIL # BLD AUTO: 0.98 THOUSAND/ΜL (ref 0–0.61)
EOSINOPHIL NFR BLD AUTO: 12 % (ref 0–6)
ERYTHROCYTE [DISTWIDTH] IN BLOOD BY AUTOMATED COUNT: 13.3 % (ref 11.6–15.1)
GFR SERPL CREATININE-BSD FRML MDRD: 83 ML/MIN/1.73SQ M
GLUCOSE SERPL-MCNC: 112 MG/DL (ref 65–140)
HCT VFR BLD AUTO: 38 % (ref 36.5–49.3)
HGB BLD-MCNC: 12.5 G/DL (ref 12–17)
HOLD SPECIMEN: NORMAL
IMM GRANULOCYTES # BLD AUTO: 0.02 THOUSAND/UL (ref 0–0.2)
IMM GRANULOCYTES NFR BLD AUTO: 0 % (ref 0–2)
LIPASE SERPL-CCNC: 38 U/L (ref 73–393)
LYMPHOCYTES # BLD AUTO: 1.58 THOUSANDS/ΜL (ref 0.6–4.47)
LYMPHOCYTES NFR BLD AUTO: 19 % (ref 14–44)
MCH RBC QN AUTO: 31 PG (ref 26.8–34.3)
MCHC RBC AUTO-ENTMCNC: 32.9 G/DL (ref 31.4–37.4)
MCV RBC AUTO: 94 FL (ref 82–98)
MONOCYTES # BLD AUTO: 0.58 THOUSAND/ΜL (ref 0.17–1.22)
MONOCYTES NFR BLD AUTO: 7 % (ref 4–12)
NEUTROPHILS # BLD AUTO: 5.03 THOUSANDS/ΜL (ref 1.85–7.62)
NEUTS SEG NFR BLD AUTO: 61 % (ref 43–75)
NRBC BLD AUTO-RTO: 0 /100 WBCS
PLATELET # BLD AUTO: 274 THOUSANDS/UL (ref 149–390)
PMV BLD AUTO: 9.1 FL (ref 8.9–12.7)
POTASSIUM SERPL-SCNC: 3.8 MMOL/L (ref 3.5–5.3)
PROT SERPL-MCNC: 7.1 G/DL (ref 6.4–8.2)
RBC # BLD AUTO: 4.03 MILLION/UL (ref 3.88–5.62)
SODIUM SERPL-SCNC: 141 MMOL/L (ref 136–145)
WBC # BLD AUTO: 8.24 THOUSAND/UL (ref 4.31–10.16)

## 2020-12-24 PROCEDURE — G1004 CDSM NDSC: HCPCS

## 2020-12-24 PROCEDURE — 74177 CT ABD & PELVIS W/CONTRAST: CPT

## 2020-12-24 PROCEDURE — 99285 EMERGENCY DEPT VISIT HI MDM: CPT | Performed by: PHYSICIAN ASSISTANT

## 2020-12-24 PROCEDURE — 83690 ASSAY OF LIPASE: CPT | Performed by: EMERGENCY MEDICINE

## 2020-12-24 PROCEDURE — 99284 EMERGENCY DEPT VISIT MOD MDM: CPT

## 2020-12-24 PROCEDURE — 96374 THER/PROPH/DIAG INJ IV PUSH: CPT

## 2020-12-24 PROCEDURE — 80053 COMPREHEN METABOLIC PANEL: CPT | Performed by: EMERGENCY MEDICINE

## 2020-12-24 PROCEDURE — 85025 COMPLETE CBC W/AUTO DIFF WBC: CPT | Performed by: EMERGENCY MEDICINE

## 2020-12-24 PROCEDURE — 36415 COLL VENOUS BLD VENIPUNCTURE: CPT

## 2020-12-24 RX ADMIN — IOHEXOL 100 ML: 350 INJECTION, SOLUTION INTRAVENOUS at 18:46

## 2020-12-24 RX ADMIN — MORPHINE SULFATE 2 MG: 2 INJECTION, SOLUTION INTRAMUSCULAR; INTRAVENOUS at 18:31

## 2020-12-30 ENCOUNTER — PATIENT OUTREACH (OUTPATIENT)
Dept: FAMILY MEDICINE CLINIC | Facility: CLINIC | Age: 85
End: 2020-12-30

## 2021-01-06 ENCOUNTER — PATIENT OUTREACH (OUTPATIENT)
Dept: FAMILY MEDICINE CLINIC | Facility: CLINIC | Age: 86
End: 2021-01-06

## 2021-01-06 NOTE — PROGRESS NOTES
Telephone call placed to patient's daughter, Sherrin Kocher to provide follow up communication  She informed me that patient is doing well and is still receiving home PT from [facility]  She also reports that patient is no longer in need of taking Tradjenta due to his blood sugars being low  I asked her if she met with Gita Deluca from [employer] that past Monday  Sherrin Kocher informed me that she rescheduled the appointment for this Friday 1/8 due to her sister not being available  Jalaine Severe from [employer] will be meeting with patient and daughter in the home and will contact patient's other daughter in [place] via conference call  Sherrin Kocher denies having any other needs at this time and agrees to contact me if she requires further assistance  I will follow up in a few weeks

## 2021-01-08 ENCOUNTER — PATIENT OUTREACH (OUTPATIENT)
Dept: FAMILY MEDICINE CLINIC | Facility: CLINIC | Age: 86
End: 2021-01-08

## 2021-01-08 NOTE — PROGRESS NOTES
Telephone call received from patient's daughter Dennis Lovell and she included her sister Osiris Curtis via conference call  Dennis Lovell informed me that she met with Sherly Lin today from [employer]  She and her sister Osiris Curtis had questions about [employer] and waiver services with [facility]  I explained the both programs and also provided them with the website for the  [address]      They will consider both programs and will inform me if they will like a referral for the waiver program

## 2021-01-11 ENCOUNTER — TELEPHONE (OUTPATIENT)
Dept: FAMILY MEDICINE CLINIC | Facility: CLINIC | Age: 86
End: 2021-01-11

## 2021-01-11 DIAGNOSIS — R26.2 AMBULATORY DYSFUNCTION: Primary | ICD-10-CM

## 2021-01-11 DIAGNOSIS — G30.1 LATE ONSET ALZHEIMER'S DISEASE WITHOUT BEHAVIORAL DISTURBANCE (HCC): ICD-10-CM

## 2021-01-11 DIAGNOSIS — F02.80 LATE ONSET ALZHEIMER'S DISEASE WITHOUT BEHAVIORAL DISTURBANCE (HCC): ICD-10-CM

## 2021-01-11 NOTE — TELEPHONE ENCOUNTER
Ananya from  Physical Therapy saw the patient today and would like to know if you would place an order for OT to help his daughter learn with helping the patient shower and dress    Please fax the order to [phone]

## 2021-01-12 ENCOUNTER — TELEMEDICINE (OUTPATIENT)
Dept: FAMILY MEDICINE CLINIC | Facility: CLINIC | Age: 86
End: 2021-01-12
Payer: MEDICARE

## 2021-01-12 ENCOUNTER — TELEMEDICINE (OUTPATIENT)
Dept: GERIATRICS | Age: 86
End: 2021-01-12
Payer: COMMERCIAL

## 2021-01-12 ENCOUNTER — TELEPHONE (OUTPATIENT)
Dept: GERIATRICS | Age: 86
End: 2021-01-12

## 2021-01-12 VITALS
SYSTOLIC BLOOD PRESSURE: 120 MMHG | OXYGEN SATURATION: 97 % | TEMPERATURE: 97.6 F | DIASTOLIC BLOOD PRESSURE: 77 MMHG | HEART RATE: 70 BPM

## 2021-01-12 VITALS
HEART RATE: 70 BPM | WEIGHT: 127 LBS | BODY MASS INDEX: 24.94 KG/M2 | DIASTOLIC BLOOD PRESSURE: 77 MMHG | HEIGHT: 60 IN | SYSTOLIC BLOOD PRESSURE: 128 MMHG | OXYGEN SATURATION: 97 % | TEMPERATURE: 97.6 F

## 2021-01-12 DIAGNOSIS — E03.9 ACQUIRED HYPOTHYROIDISM: ICD-10-CM

## 2021-01-12 DIAGNOSIS — G30.1 LATE ONSET ALZHEIMER'S DISEASE WITHOUT BEHAVIORAL DISTURBANCE (HCC): Primary | ICD-10-CM

## 2021-01-12 DIAGNOSIS — R32 URINARY INCONTINENCE, UNSPECIFIED TYPE: ICD-10-CM

## 2021-01-12 DIAGNOSIS — L98.421 SKIN ULCER OF SACRUM, LIMITED TO BREAKDOWN OF SKIN (HCC): ICD-10-CM

## 2021-01-12 DIAGNOSIS — R15.9 INCONTINENCE OF FECES, UNSPECIFIED FECAL INCONTINENCE TYPE: ICD-10-CM

## 2021-01-12 DIAGNOSIS — Z86.73 HISTORY OF CVA (CEREBROVASCULAR ACCIDENT): Chronic | ICD-10-CM

## 2021-01-12 DIAGNOSIS — F02.80 LATE ONSET ALZHEIMER'S DISEASE WITHOUT BEHAVIORAL DISTURBANCE (HCC): Primary | ICD-10-CM

## 2021-01-12 DIAGNOSIS — R26.2 AMBULATORY DYSFUNCTION: ICD-10-CM

## 2021-01-12 DIAGNOSIS — E78.2 MIXED HYPERLIPIDEMIA: ICD-10-CM

## 2021-01-12 DIAGNOSIS — K63.89 COLONIC MASS: ICD-10-CM

## 2021-01-12 DIAGNOSIS — E11.9 TYPE 2 DIABETES MELLITUS WITHOUT COMPLICATION, WITHOUT LONG-TERM CURRENT USE OF INSULIN (HCC): ICD-10-CM

## 2021-01-12 DIAGNOSIS — R39.12 BENIGN PROSTATIC HYPERPLASIA WITH WEAK URINARY STREAM: ICD-10-CM

## 2021-01-12 DIAGNOSIS — F03.90 DEMENTIA WITHOUT BEHAVIORAL DISTURBANCE, UNSPECIFIED DEMENTIA TYPE (HCC): Primary | ICD-10-CM

## 2021-01-12 DIAGNOSIS — N40.1 BENIGN PROSTATIC HYPERPLASIA WITH WEAK URINARY STREAM: ICD-10-CM

## 2021-01-12 PROCEDURE — 1160F RVW MEDS BY RX/DR IN RCRD: CPT | Performed by: STUDENT IN AN ORGANIZED HEALTH CARE EDUCATION/TRAINING PROGRAM

## 2021-01-12 PROCEDURE — 99215 OFFICE O/P EST HI 40 MIN: CPT | Performed by: STUDENT IN AN ORGANIZED HEALTH CARE EDUCATION/TRAINING PROGRAM

## 2021-01-12 PROCEDURE — 1036F TOBACCO NON-USER: CPT | Performed by: FAMILY MEDICINE

## 2021-01-12 PROCEDURE — 99214 OFFICE O/P EST MOD 30 MIN: CPT | Performed by: FAMILY MEDICINE

## 2021-01-12 RX ORDER — TAMSULOSIN HYDROCHLORIDE 0.4 MG/1
0.4 CAPSULE ORAL
Qty: 30 CAPSULE | Refills: 0 | Status: CANCELLED | OUTPATIENT
Start: 2021-01-12

## 2021-01-12 RX ORDER — METFORMIN HYDROCHLORIDE 500 MG/1
500 TABLET, EXTENDED RELEASE ORAL 2 TIMES DAILY WITH MEALS
Qty: 60 TABLET | Refills: 5 | Status: SHIPPED | OUTPATIENT
Start: 2021-01-12 | End: 2021-02-05 | Stop reason: SDUPTHER

## 2021-01-12 RX ORDER — GLIPIZIDE 5 MG/1
5 TABLET, FILM COATED, EXTENDED RELEASE ORAL 2 TIMES DAILY
Qty: 60 TABLET | Refills: 0 | Status: CANCELLED | OUTPATIENT
Start: 2021-01-12

## 2021-01-12 RX ORDER — ATORVASTATIN CALCIUM 20 MG/1
20 TABLET, FILM COATED ORAL DAILY
Qty: 30 TABLET | Refills: 5 | Status: SHIPPED | OUTPATIENT
Start: 2021-01-12 | End: 2021-02-05 | Stop reason: SDUPTHER

## 2021-01-12 RX ORDER — GLIPIZIDE 5 MG/1
5 TABLET, FILM COATED, EXTENDED RELEASE ORAL 2 TIMES DAILY
Qty: 60 TABLET | Refills: 5 | Status: SHIPPED | OUTPATIENT
Start: 2021-01-12 | End: 2021-02-16

## 2021-01-12 RX ORDER — LEVOTHYROXINE SODIUM 112 UG/1
112 TABLET ORAL DAILY
Qty: 30 TABLET | Refills: 5 | Status: SHIPPED | OUTPATIENT
Start: 2021-01-12 | End: 2021-04-13

## 2021-01-12 RX ORDER — ATORVASTATIN CALCIUM 20 MG/1
20 TABLET, FILM COATED ORAL DAILY
Qty: 30 TABLET | Refills: 0 | Status: CANCELLED | OUTPATIENT
Start: 2021-01-12

## 2021-01-12 RX ORDER — LEVOTHYROXINE SODIUM 112 UG/1
112 TABLET ORAL DAILY
Qty: 30 TABLET | Refills: 0 | Status: CANCELLED | OUTPATIENT
Start: 2021-01-12

## 2021-01-12 RX ORDER — METFORMIN HYDROCHLORIDE 500 MG/1
500 TABLET, EXTENDED RELEASE ORAL 2 TIMES DAILY WITH MEALS
Qty: 60 TABLET | Refills: 0 | Status: CANCELLED | OUTPATIENT
Start: 2021-01-12

## 2021-01-12 NOTE — PROGRESS NOTES
Virtual Regular Visit    Assessment/Plan:    Problem List Items Addressed This Visit        Unprioritized    Acquired hypothyroidism    Relevant Medications    levothyroxine 112 mcg tablet    Type 2 diabetes mellitus without complication, without long-term current use of insulin (HCC)    Relevant Medications    metFORMIN (GLUCOPHAGE-XR) 500 mg 24 hr tablet    glipiZIDE (GLUCOTROL XL) 5 mg 24 hr tablet    glucose blood test strip    Mixed hyperlipidemia    Relevant Medications    atorvastatin (LIPITOR) 20 mg tablet    Dementia without behavioral disturbance (HCC) - Primary      Other Visit Diagnoses     Skin ulcer of sacrum, limited to breakdown of skin [facility])        Colonic mass        Relevant Orders    Ambulatory referral to Colorectal Surgery      we had a review today about colonic thickening, possible mass  Reviewed pros and cons of colonoscopy and family's goals of comfort  At this time he no symptoms, bowels are normal and no bleeding  We decided to refer to CR for their opinion if there is a non-invasive was to watch this, as she does not think he would tolerate the prep or a colonoscopy well  We are inclined to put his comfort first, and treat only if this would become symptomatic for the patient  Will ask CR for their opinion on what they see on CT scan  5 minutes spent on chart prep, 25 minutes spent with patient counseling/educating on their diagnoses, tests completed and any new tests ordered, any referrals placed, treatment options, and documentation of above today  In prescribing new medications, or changing doses, we reviewed the risks and benefits and side effects of these medications along with other treatment options if appropriate  No follow-ups on file  BMI Counseling: Body mass index is 28 47 kg/m²  The BMI is above normal  Nutrition recommendations include decreasing portion sizes  Exercise recommendations include exercising 3-5 times per week             Reason for visit is Chief Complaint   Patient presents with    Follow-up     12/24/20 abdominal pain, gas        Encounter provider Griselda Mcdaniel DO    Provider located at [address] [place] [address],[address]  [address]  [facility] [address] [phone]    Recent Visits  Date Type Provider Dept   01/12/21 Steven Kwok 587, DO Girish Ross   01/11/21 Telephone Alessio Agudelo   Showing recent visits within past 7 days and meeting all other requirements     Future Appointments  No visits were found meeting these conditions  Showing future appointments within next 150 days and meeting all other requirements        The patient was identified by name and date of birth  Haley Torres was informed that this is a telemedicine visit and that the visit is being conducted through [facility] and patient was informed that this is a secure, HIPAA-compliant platform  He agrees to proceed     My office door was closed  No one else was in the room  He acknowledged consent and understanding of privacy and security of the video platform  The patient has agreed to participate and understands they can discontinue the visit at any time  Patient is aware this is a billable service  Subjective  Haley Torres is a 82 y o  male is being seen via Video Visit today due to the COVID-19 pandemic  Chief Complaint   Patient presents with    Follow-up     12/24/20 abdominal pain, gas        Today's concerns are:    Since ER he is going to [place] twice a day to daily   Eating a little more - veggies fish, yogurt   More veggies   Buttocks looking ernie better- healing better  PT going well   Had a high blood pressure reading - they are considering changing his meds  Needs some med refills     Vitals:    01/12/21 1500   BP: 128/77   Pulse: 70   Temp: 97 6 °F (36 4 °C)   SpO2: 97%   Weight: 57 6 kg (127 lb)   Height: 4' 8" (1 422 m)     Wt Readings from Last 3 Encounters:   01/12/21 57 6 kg (127 lb) 11/12/20 57 6 kg (127 lb)   11/06/20 57 7 kg (127 lb 3 3 oz)     BP Readings from Last 3 Encounters:   01/12/21 128/77   01/12/21 120/77   12/24/20 144/82       PHQ-9 Depression Screening    PHQ-9:   Frequency of the following problems over the past two weeks:             Past Medical History:   Diagnosis Date    Allergic     Lactose: Shellfish    Asthma     Diabetes mellitus ([address] )     Glaucoma        Past Surgical History:   Procedure Laterality Date    TESTICLE SURGERY         Current Outpatient Medications   Medication Sig Dispense Refill    atorvastatin (LIPITOR) 20 mg tablet Take 1 tablet (20 mg total) by mouth daily 30 tablet 5    glipiZIDE (GLUCOTROL XL) 5 mg 24 hr tablet Take 1 tablet (5 mg total) by mouth 2 (two) times a day 60 tablet 5    glucose blood test strip Use 1 each 2 (two) times a day Use as instructed 100 each 5    levothyroxine 112 mcg tablet Take 1 tablet (112 mcg total) by mouth daily 30 tablet 5    metFORMIN (GLUCOPHAGE-XR) 500 mg 24 hr tablet Take 1 tablet (500 mg total) by mouth 2 (two) times a day with meals 60 tablet 5    Microlet Lancets MISC by Does not apply route 2 (two) times a day 100 each 5    mupirocin (BACTROBAN) 2 % ointment Apply topically 2 (two) times a day 30 g 1    nystatin (MYCOSTATIN) cream Apply topically 2 (two) times a day as needed (rash) 30 g 2    tamsulosin (FLOMAX) 0 4 mg Take 1 capsule (0 4 mg total) by mouth daily with dinner 30 capsule 5    Transparent Dressings (Tegaderm Film 4"x4-1/2") MISC Use every 3 (three) days 10 each 1    trospium chloride (SANCTURA) 20 mg tablet Take 1 tablet (20 mg total) by mouth 2 (two) times a day 60 tablet 3     No current facility-administered medications for this visit  Allergies   Allergen Reactions    Lactose     Shellfish-Derived Products Diarrhea       Review of Systems  all others negative per daughter       Physical Exam   Video Exam Pt not examined in person - seen over FaceTime   Constitutional: he appears well-developed and well-nourished  HENT: Head: Normocephalic  Right Ear: External ear normal    Left Ear: External ear normal    Nose: Nose normal    Eyes: Pupils are equal, round, and reactive to light  Right eye exhibits no discharge  Left eye exhibits no discharge  No scleral icterus  Neck: Normal range of motion  Pulmonary/Chest: Effort normal  No respiratory distress  Neurological: he is alert    Skin: Skin is warm and dry on face - no rashes  Not pale  Not diaphoretic  Psychiatric: he  has a normal mood and affect  As a result of this visit, I have not referred the patient for further respiratory evaluation  VIRTUAL VISIT DISCLAIMER    Frandy Riddle acknowledges that he has consented to an online visit or consultation  He understands that the online visit is based solely on information provided by him, and that, in the absence of a face-to-face physical evaluation by the physician, the diagnosis he receives is both limited and provisional in terms of accuracy and completeness  This is not intended to replace a full medical face-to-face evaluation by the physician  Frandy Riddle understands and accepts these terms

## 2021-01-12 NOTE — PROGRESS NOTES
[facility]  [address], [address], [address] [facility]  [phone]    Care Conference    NAME: Yeni Chau  AGE: 82 y o  SEX: male  YOB: 1935  DATE OF CONFERENCE: 1/12/2021    Family Present: Maria Teresa Jim (daughter), Debbie Horn (daughter)  Staff Present: May Draper MD, Iram Hernandez [facility]    Medical Concerns (Current/Historical):  History of CVA, acquired hypothyroidism, type 2 diabetes mellitus, mixed hyperlipidemia  Geriatric Syndromes/Age Related Syndromes: Ambulatory dysfunction, urinary incontinence, incontinence of feces    Neuropsychological   Dementia, severe, 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, Alzheimer's disease vs   vascular dementia vs  possible posttraumatic encephalopathy   Josue Cognitive Assessment: 2/30   Geriatric Depression Screen: 3/15      Remain active physically, mentally and socially   Will refer to OT/speech therapy for cognitive training and caregiver support training   Pharmaceutical and non-pharmaceutical interventions discussed   Recommend Mediterranean diet, shown to decrease risk of memory loss and CVA   Engage in cognitively challenging exercises, as able   Maintain chronic conditions under control; imperative to follow up closely with PCP for management of vascular risk factors    Diagnostic Studies   Review of bloodwork   Review of MRI (2017) showing stable small chronic lacune in left thalamus, mild to moderate ventricular dilatation, moderate to severe atrophy more prominent in bilateral temporal lobes, hyperdynamic flow through the aqueduct of [place] with recommendations to correlate for NPH    Physical Finding Impacting Function   Timed Up and Go Test: Deferred   Activities of Daily Living: Dependent   Instrumental Activities of Daily Living: Dependent  Physical Therapy recommended: Yes, for gait training, balance and  strengthening  Ambulatory dysfunction     Encourage appropriate footwear and use of rolling walker at all times   Review fall risk prevention tips and adjust within the home environment as needed   Recommend use of fall precautions including fall alert device    Medications Reviewed    Recommend weaning off trospium given side effect profile for causing hallucinations, xerostomia, fatigue, dizziness, confusion in elderly frail patients with dementia   Patient advised to avoid over the counter medications that can affect cognition (e g , Benadryl, Tylenol PM)    Also advised to avoid NSAIDs due to risk of GI bleed and renal impairment    Other Findings   Recommend adherence to a diabetic, heart healthy diet   Continue following with primary care physician regularly  History of CVA  · Imperative to manage vascular risk factors for stroke (diabetes, HLD)  Acquired hypothyroidism  · Currently on levothyroxine 112 mcg daily  Type 2 diabetes mellitus  · Currently on glipizide 5 mg twice daily  · Continue Tradjenta 5 mg daily, metformin 500 mg twice daily  · Recommend improved control of diabetes given likely vascular etiology as a contributor to dementia  Mixed hyperlipidemia  · Continue atorvastatin 20 mg daily  Benign prostatic hyperplasia with weak urinary stream  · Currently on Flomax 0 4 mg daily and trospium 20 mg twice daily  · Recommend weaning off trospium given side effect profile for causing hallucinations, xerostomia, fatigue, dizziness, confusion in elderly frail patients with dementia  Urinary incontinence / Fecal incontinence  · Recommend scheduled toileting every two hours once awake  · Avoid fluid intake two hours prior to bedtime to avoid nocturnal enuresis  · Recommend frequent diaper changes to avoid skin breakdown    Recommended Health Maintenance   Immunizations, if not contraindicated:    Flu vaccine yearly   Pneumo vaccine every 5 years (65 years and over)   Shingles vaccine    Social / Safety Concerns   Criteria met for placement into a nursing home facility as Mr Billie Fan requires assistance with all ADLs and IADLs   - This would provide a safe environment with meal provision, medication administration, and opportunities to remain active mentally, physically and socially   If remaining at home, 24/7 supervision required   - Family pursuing involvement with [facility]   - Would recommend an Adult Day Program and home care aides for positive socialization and family respite   - Recommend review of fall risk prevention tips (Home Safety Checklist)   - Fall alert information declined as Mr Chaparrita Meza is never unsupervised    - Consider assistance for medication administration, if desired    [address]  Recommend caregiver support groups and educational resources through the Alzheimer's Association   Utilize reorientation and redirection as needed (dependent on situation)   Access Alzheimer's Association 24/7 Helpline at 0-516.157.5719  Standard [place] provided   Recommended literature: 36 Hour Day, Untangling Alzheimer's, Learning to Speak Alzheimer's            *Based on current recommendations by the CDC due to COVID-19, please maintain social distancing at this time  Patient and family verbalized understanding of above care plan  With any questions, please contact our office at [phone]

## 2021-01-12 NOTE — TELEPHONE ENCOUNTER
[address], [address] [place] [address]  [phone]  Social Work Intake, continued    LCSW contacted Wesley Perez to review additional questions and discuss Waiver program/[facility] further  Per chart review, FELICIANO Whiting has relayed this information to Wesley Perez in recent months  [facility] and Organizations: Will be involved with [facility] -  came to the home on Friday to speak with Wesley Perez and [facility] which have helped with shopping, meals, bathing, etc : None reported    [address]   Does the residence have any of the following? Adequate plumbing      Yes    Adequate heat       Yes    Adequate ventilation      Yes   Are there signs of neglect such as the following? Unkempt house      No   Old food in refrigerator     No   Infestation       No    EMERGENCY HEALTH PLAN   Is there a phone that is accessible to the patient or caregiver? Yes, however, Mr Wilson Garibay is unable to use the phone independently  Is the number to police, physician, and 911 easily accessible? N/A - unable to independently use phone  Would the patient be able to call 911 in an emergency? Unsure    ENVIRONMENT APPROPRIATENESS  Please note if each is available and accessible to the patient: Patient requires assistance to get up out of his chair, then uses rolling walker  Bedroom          Bathroom          Kitchen          Living Room            Does the bathroom have any of the following?    Handrails in tub or toilet     Yes

## 2021-01-12 NOTE — PATIENT INSTRUCTIONS
High Fiber Diet   AMBULATORY CARE:   A high-fiber diet  includes foods that have a high amount of fiber  Fiber is the part of fruits, vegetables, and grains that is not broken down by your body  Fiber keeps your bowel movements regular  Fiber can also help lower your cholesterol level, control blood sugar in people with diabetes, and relieve constipation  Fiber can also help you control your weight because it helps you feel full faster  Most adults should eat 25 to 35 grams of fiber each day  Talk to your dietitian or healthcare provider about the amount of fiber you need  Good sources of fiber:       · Foods with at least 4 grams of fiber per serving:      ? ? to ½ cup of high-fiber cereal (check the nutrition label on the box)    ? ½ cup of blackberries or raspberries    ? 4 dried prunes    ? 1 cooked artichoke    ? ½ cup of cooked legumes, such as lentils, or red, kidney, and guidry beans    · Foods with 1 to 3 grams of fiber per serving:      ? 1 slice of whole-wheat, pumpernickel, or rye bread    ? ½ cup of cooked brown rice    ? 4 whole-wheat crackers    ? 1 cup of oatmeal    ? ½ cup of cereal with 1 to 3 grams of fiber per serving (check the nutrition label on the box)    ? 1 small piece of fruit, such as an apple, banana, pear, kiwi, or orange    ? 3 dates    ? ½ cup of canned apricots, fruit cocktail, peaches, or pears    ? ½ cup of raw or cooked vegetables, such as carrots, cauliflower, cabbage, spinach, squash, or corn  Ways that you can increase fiber in your diet:   · Choose brown or wild rice instead of white rice  · Use whole wheat flour in recipes instead of white or all-purpose flour  · Add beans and peas to casseroles or soups  · Choose fresh fruit and vegetables with peels or skins on instead of juices  Other diet guidelines to follow:   · Add fiber to your diet slowly  You may have abdominal discomfort, bloating, and gas if you add fiber to your diet too quickly       · Drink plenty of liquids as you add fiber to your diet  You may have nausea or develop constipation if you do not drink enough water  Ask how much liquid to drink each day and which liquids are best for you  © Copyright [address] Information is for End User's use only and may not be sold, redistributed or otherwise used for commercial purposes  All illustrations and images included in CareNotes® are the copyrighted property of A D A M , Inc  or [facility] Wu Martinez   The above information is an  only  It is not intended as medical advice for individual conditions or treatments  Talk to your doctor, nurse or pharmacist before following any medical regimen to see if it is safe and effective for you

## 2021-01-13 PROBLEM — F03.90 DEMENTIA WITHOUT BEHAVIORAL DISTURBANCE (HCC): Status: ACTIVE | Noted: 2020-07-09

## 2021-01-13 NOTE — PROGRESS NOTES
Virtual Regular Visit      Assessment/Plan:    Problem List Items Addressed This Visit        Endocrine    Acquired hypothyroidism    Type 2 diabetes mellitus without complication, without long-term current use of insulin (HCC)       Nervous and Auditory    Dementia without behavioral disturbance ([address] )       Other    History of CVA (cerebrovascular accident) - Primary (Chronic)    Ambulatory dysfunction    Mixed hyperlipidemia    Urinary incontinence    Fecal incontinence        See detailed assessment and plan as outlined below in attested care plan per 2nd note       Reason for visit is   Chief Complaint   Patient presents with    Virtual Regular Visit        Encounter provider Chino Ulrich MD    Provider located at [address] [place] [address] Road  [address]  [address] [address]      Recent Visits  Date Type Provider Dept   01/12/21 Telephone Efra Pitt, [facility]   01/12/21 Steven Kwok 588, 6[address] [facility]   01/12/21 [address] [address] [facility] [address]   Showing recent visits within past 7 days and meeting all other requirements     Future Appointments  No visits were found meeting these conditions  Showing future appointments within next 150 days and meeting all other requirements        The patient was identified by name and date of birth  Erasmo Li was informed that this is a telemedicine visit and that the visit is being conducted through [employer] and patient was informed that this is a secure, HIPAA-compliant platform  He agrees to proceed     My office door was closed  No one else was in the room  He acknowledged consent and understanding of privacy and security of the video platform  The patient has agreed to participate and understands they can discontinue the visit at any time  Patient is aware this is a billable service       Subjective  Erasmo Li is a 82 y o  male who presents for his care plan conference   HPI     Patient presents for his comprehensive geriatric assessment care plan  His daughter is present during the entire interview  Per daughter, no acute concerns as the patient has not had any cardiorespiratory distress, fever, chills, URI, urinary symptoms or lethargy  He continues to tolerate oral intake, has been sleeping well, denies any falls or overt changes in mood or personality  Past Medical History:   Diagnosis Date    Allergic     Lactose: Shellfish    Asthma     Diabetes mellitus ([address] )     Glaucoma        Past Surgical History:   Procedure Laterality Date    TESTICLE SURGERY         Current Outpatient Medications   Medication Sig Dispense Refill    atorvastatin (LIPITOR) 20 mg tablet Take 1 tablet (20 mg total) by mouth daily 30 tablet 5    glipiZIDE (GLUCOTROL XL) 5 mg 24 hr tablet Take 1 tablet (5 mg total) by mouth 2 (two) times a day 60 tablet 5    glucose blood test strip Use 1 each 2 (two) times a day Use as instructed 100 each 5    levothyroxine 112 mcg tablet Take 1 tablet (112 mcg total) by mouth daily 30 tablet 5    metFORMIN (GLUCOPHAGE-XR) 500 mg 24 hr tablet Take 1 tablet (500 mg total) by mouth 2 (two) times a day with meals 60 tablet 5    Microlet Lancets MISC by Does not apply route 2 (two) times a day 100 each 5    mupirocin (BACTROBAN) 2 % ointment Apply topically 2 (two) times a day 30 g 1    nystatin (MYCOSTATIN) cream Apply topically 2 (two) times a day as needed (rash) 30 g 2    tamsulosin (FLOMAX) 0 4 mg Take 1 capsule (0 4 mg total) by mouth daily with dinner 30 capsule 5    Transparent Dressings (Tegaderm Film 4"x4-1/2") MISC Use every 3 (three) days 10 each 1    trospium chloride (SANCTURA) 20 mg tablet Take 1 tablet (20 mg total) by mouth 2 (two) times a day 60 tablet 3     No current facility-administered medications for this visit           Allergies   Allergen Reactions    Lactose     Shellfish-Derived Products Diarrhea       Review of Systems   Unable to perform ROS: Dementia       Video Exam    Vitals:    01/12/21 1458   BP: 120/77   BP Location: Left arm   Patient Position: Sitting   Cuff Size: Standard   Pulse: 70   Temp: 97 6 °F (36 4 °C)   TempSrc: Temporal   SpO2: 97%       Physical Exam  Constitutional:       General: He is not in acute distress  Appearance: Normal appearance  He is not ill-appearing  Comments: Elderly male sitting comfortably in chair in no obvious cardiorespiratory or painful distress   HENT:      Head: Normocephalic and atraumatic  Right Ear: External ear normal       Left Ear: External ear normal       Nose: Nose normal  No congestion or rhinorrhea  Mouth/Throat:      Mouth: Mucous membranes are moist    Eyes:      General:         Right eye: No discharge  Left eye: No discharge  Conjunctiva/sclera: Conjunctivae normal    Neck:      Musculoskeletal: Neck supple  Pulmonary:      Effort: Pulmonary effort is normal  No respiratory distress  Abdominal:      General: There is no distension  Palpations: Abdomen is soft  Tenderness: There is no abdominal tenderness  Musculoskeletal:         General: No swelling  Skin:     General: Skin is dry  Neurological:      Mental Status: He is alert  Mental status is at baseline  He is disoriented  Gait: Gait abnormal    Psychiatric:         Mood and Affect: Mood normal       Comments: Pleasantly confused at baseline       I spent 50 minutes directly with the patient during this visit by video and phone      VIRTUAL VISIT DISCLAIMER    Grabiel Krunal acknowledges that he has consented to an online visit or consultation  He understands that the online visit is based solely on information provided by him, and that, in the absence of a face-to-face physical evaluation by the physician, the diagnosis he receives is both limited and provisional in terms of accuracy and completeness   This is not intended to replace a full medical face-to-face evaluation by the physician  Ariadna Perez understands and accepts these terms

## 2021-01-18 ENCOUNTER — TELEPHONE (OUTPATIENT)
Dept: FAMILY MEDICINE CLINIC | Facility: CLINIC | Age: 86
End: 2021-01-18

## 2021-01-18 ENCOUNTER — TELEPHONE (OUTPATIENT)
Dept: GERIATRICS | Age: 86
End: 2021-01-18

## 2021-01-18 NOTE — TELEPHONE ENCOUNTER
[facility] called patient is already receiving services from [address] [place] [address]  So [facility] will not be going in the home

## 2021-01-18 NOTE — TELEPHONE ENCOUNTER
Gabriel Rebolledo from [facility]- is calling for the form they sent last week and the H&P requested  They need it returned by today   Please fax to [phone]

## 2021-01-25 ENCOUNTER — PATIENT OUTREACH (OUTPATIENT)
Dept: FAMILY MEDICINE CLINIC | Facility: CLINIC | Age: 86
End: 2021-01-25

## 2021-01-25 NOTE — PROGRESS NOTES
Telephone call placed to patient's daughter, Rigoberto Clancy today  She informed me that patient is doing will and he continues to have PT/OT at home  Patient had his appointment with the [facility] team     Nancy Rivera also reports that patient has [facility] now as of 1/1  Patient is being assessed for [facility] and she has an appointment with Areil Ching today at [place] agrees to contact me if she needs any additional assistance  I informed her that I would reach out to her again next month and check on the status of patient's application with [facility]

## 2021-01-27 ENCOUNTER — PATIENT MESSAGE (OUTPATIENT)
Dept: FAMILY MEDICINE CLINIC | Facility: CLINIC | Age: 86
End: 2021-01-27

## 2021-01-27 ENCOUNTER — TELEPHONE (OUTPATIENT)
Dept: LAB | Facility: HOSPITAL | Age: 86
End: 2021-01-27

## 2021-01-27 DIAGNOSIS — E11.9 TYPE 2 DIABETES MELLITUS WITHOUT COMPLICATION, WITHOUT LONG-TERM CURRENT USE OF INSULIN (HCC): ICD-10-CM

## 2021-01-27 NOTE — TELEPHONE ENCOUNTER
From: Early Epp  To: Lety Chamorro DO  Sent: 2021 1:44 PM EST  Subject: Non-Urgent [address] Latisha Vargas you are having a good day  The pharmacist at [facility] informed me that my dad and your patient, Early Kush  1935, needs a new prescription for the following:    Glucose Blood Test Strips    Thanks for your help  I really appreciate it  Enjoy the rest of the day      Sotero Joyner

## 2021-02-01 RX ORDER — BLOOD SUGAR DIAGNOSTIC
STRIP MISCELLANEOUS
Qty: 100 EACH | Refills: 5 | Status: SHIPPED | OUTPATIENT
Start: 2021-02-01 | End: 2021-04-13

## 2021-02-05 DIAGNOSIS — E78.2 MIXED HYPERLIPIDEMIA: ICD-10-CM

## 2021-02-05 DIAGNOSIS — E11.9 TYPE 2 DIABETES MELLITUS WITHOUT COMPLICATION, WITHOUT LONG-TERM CURRENT USE OF INSULIN (HCC): ICD-10-CM

## 2021-02-05 RX ORDER — METFORMIN HYDROCHLORIDE 500 MG/1
500 TABLET, EXTENDED RELEASE ORAL 2 TIMES DAILY WITH MEALS
Qty: 180 TABLET | Refills: 1 | Status: SHIPPED | OUTPATIENT
Start: 2021-02-05 | End: 2021-02-16

## 2021-02-05 RX ORDER — ATORVASTATIN CALCIUM 20 MG/1
20 TABLET, FILM COATED ORAL DAILY
Qty: 90 TABLET | Refills: 2 | Status: SHIPPED | OUTPATIENT
Start: 2021-02-05

## 2021-02-05 NOTE — H&P (VIEW-ONLY)
Colon and Rectal Surgery   Bryson Murguia 82 y o  male MRN [id number]  Encounter: [id number]  02/10/21 3:54 PM            Assessment: Bryson Murguia is a 82 y o  male who has an abnormal CT scan  Plan:   Diverticulosis  He has a specific area at the rectosigmoid junction that is narrowed on CT scan  I can only read the current CT, as the others are archived  We discussed his findings and his left abdominal pain  His daughter Gregor Roe brought him and a 2nd daughter Daja Juan was on the telephone  They are POAs  We reviewed the possible decision observation, the possible decision of colonoscopy to investigate to determine the finding and then later make a decision on treatment, the possibility of a need for colostomy or bowel resection to treat a finding if it is cancer or a stricture  They prefer to investigate with colonoscopy to determine the diagnosis of that we can move forward with planning as indicated  He has significant dementia but has been physically well  Colonoscopy risks, not limited to bleeding, perforated colon, need for surgery, and missed lesions were discussed  Alternatives were discussed  Questions were answered  He agreed to the procedure  Subjective     HPI    Bryson Murguia is a 82 y o  male who is referred today by Dr Chuy Pedroza for abnormal CT scan  He reports he feels well today  He denies episodes of rectal bleeding, change in bowel habits or nausea or vomiting  He has at least 1 daily soft and formed bowel movement  He had a CT scan on 12/24/20 showed:  -No acute inflammatory changes in the abdomen or pelvis  -Colonic diverticulosis without diverticulitis  -Chronic circumferential thickening of the distal sigmoid colon and rectum slightly more prominent than on the prior study  Follow-up with nonurgent colonoscopy if not performed recently  Patient's daughter does not know when his last colonoscopy was    He denies family history of colorectal cancer       Historical Information   Past Medical History:   Diagnosis Date    Allergic     Lactose: Shellfish    Asthma     Diabetes mellitus ([facility] )     Glaucoma      Past Surgical History:   Procedure Laterality Date    TESTICLE SURGERY         Meds/Allergies       Current Outpatient Medications:     atorvastatin (LIPITOR) 20 mg tablet, Take 1 tablet (20 mg total) by mouth daily, Disp: 90 tablet, Rfl: 2    glipiZIDE (GLUCOTROL XL) 5 mg 24 hr tablet, Take 1 tablet (5 mg total) by mouth 2 (two) times a day, Disp: 60 tablet, Rfl: 5    glucose blood (Contour Next Test) test strip, Testing BID  DX: E11 9   Contour Next blood glucose strips, Disp: 100 each, Rfl: 5    levothyroxine 112 mcg tablet, Take 1 tablet (112 mcg total) by mouth daily, Disp: 30 tablet, Rfl: 5    metFORMIN (GLUCOPHAGE-XR) 500 mg 24 hr tablet, Take 1 tablet (500 mg total) by mouth 2 (two) times a day with meals, Disp: 180 tablet, Rfl: 1    Microlet Lancets MISC, by Does not apply route 2 (two) times a day, Disp: 100 each, Rfl: 5    mupirocin (BACTROBAN) 2 % ointment, Apply topically 2 (two) times a day, Disp: 30 g, Rfl: 1    nystatin (MYCOSTATIN) cream, Apply topically 2 (two) times a day as needed (rash), Disp: 30 g, Rfl: 2    tamsulosin (FLOMAX) 0 4 mg, Take 1 capsule (0 4 mg total) by mouth daily with dinner, Disp: 30 capsule, Rfl: 5    Transparent Dressings (Tegaderm Film 4"x4-1/2") MISC, Use every 3 (three) days, Disp: 10 each, Rfl: 1    trospium chloride (SANCTURA) 20 mg tablet, Take 1 tablet (20 mg total) by mouth 2 (two) times a day, Disp: 60 tablet, Rfl: 3  Allergies   Allergen Reactions    Lactose     Shellfish-Derived Products Diarrhea       Social History   Social History     Substance and Sexual Activity   Drug Use Never     Social History     Tobacco Use   Smoking Status Never Smoker   Smokeless Tobacco Never Used         Family History   Problem Relation Age of Onset    No Known Problems Mother     No Known Problems Father     No Known Problems Sister     No Known Problems Brother     No Known Problems Daughter     No Known Problems Sister     No Known Problems Sister     No Known Problems Brother     No Known Problems Brother     No Known Problems Brother     No Known Problems Brother     No Known Problems Daughter     No Known Problems Daughter     Colon cancer Neg Hx          Review of Systems   Constitutional: Negative  Respiratory: Negative  Cardiovascular: Negative  Objective   Current Vitals:  Vitals:    02/10/21 1527   Height: 4' 8" (1 422 m)         Physical Exam  Constitutional:       Appearance: Normal appearance  Eyes:      Conjunctiva/sclera: Conjunctivae normal    Cardiovascular:      Rate and Rhythm: Normal rate and regular rhythm  Pulmonary:      Effort: Pulmonary effort is normal       Breath sounds: Normal breath sounds  Abdominal:      General: Abdomen is flat  There is no distension  Palpations: Abdomen is soft  There is no mass  Tenderness: There is no abdominal tenderness  There is no guarding  Hernia: No hernia is present  Neurological:      General: No focal deficit present  Mental Status: He is alert and oriented to person, place, and time

## 2021-02-08 ENCOUNTER — PATIENT MESSAGE (OUTPATIENT)
Dept: FAMILY MEDICINE CLINIC | Facility: CLINIC | Age: 86
End: 2021-02-08

## 2021-02-08 NOTE — TELEPHONE ENCOUNTER
From: Karina Huffman  To: Nolanbrenda Urias DO  Sent: 2021 4:05 PM EST  Subject: Non-Urgent [address] Mary Jo Marsh you had a nice weekend and staying warm  I wanted to know if my dad and your patient, Karina Huffman  1935, should fast for his blood test this Thursday  It will be done at home  Thanks for your help      Alvarado Rivera

## 2021-02-11 ENCOUNTER — LAB (OUTPATIENT)
Dept: LAB | Facility: HOSPITAL | Age: 86
End: 2021-02-11
Payer: COMMERCIAL

## 2021-02-11 DIAGNOSIS — E03.9 ACQUIRED HYPOTHYROIDISM: ICD-10-CM

## 2021-02-11 DIAGNOSIS — E11.9 TYPE 2 DIABETES MELLITUS WITHOUT COMPLICATION, WITHOUT LONG-TERM CURRENT USE OF INSULIN (HCC): ICD-10-CM

## 2021-02-11 DIAGNOSIS — F03.90 DEMENTIA WITHOUT BEHAVIORAL DISTURBANCE, UNSPECIFIED DEMENTIA TYPE (HCC): ICD-10-CM

## 2021-02-11 LAB
ALBUMIN SERPL BCP-MCNC: 3.5 G/DL (ref 3.5–5)
ALP SERPL-CCNC: 84 U/L (ref 46–116)
ALT SERPL W P-5'-P-CCNC: 17 U/L (ref 12–78)
ANION GAP SERPL CALCULATED.3IONS-SCNC: 4 MMOL/L (ref 4–13)
AST SERPL W P-5'-P-CCNC: 13 U/L (ref 5–45)
BILIRUB SERPL-MCNC: 0.65 MG/DL (ref 0.2–1)
BUN SERPL-MCNC: 14 MG/DL (ref 5–25)
CALCIUM SERPL-MCNC: 9.5 MG/DL (ref 8.3–10.1)
CHLORIDE SERPL-SCNC: 107 MMOL/L (ref 100–108)
CO2 SERPL-SCNC: 28 MMOL/L (ref 21–32)
CREAT SERPL-MCNC: 0.64 MG/DL (ref 0.6–1.3)
EST. AVERAGE GLUCOSE BLD GHB EST-MCNC: 120 MG/DL
FOLATE SERPL-MCNC: >20 NG/ML (ref 3.1–17.5)
GFR SERPL CREATININE-BSD FRML MDRD: 91 ML/MIN/1.73SQ M
GLUCOSE SERPL-MCNC: 80 MG/DL (ref 65–140)
HBA1C MFR BLD: 5.8 %
POTASSIUM SERPL-SCNC: 3.7 MMOL/L (ref 3.5–5.3)
PROT SERPL-MCNC: 7.5 G/DL (ref 6.4–8.2)
SODIUM SERPL-SCNC: 139 MMOL/L (ref 136–145)
TSH SERPL DL<=0.05 MIU/L-ACNC: 1.3 UIU/ML (ref 0.36–3.74)
VIT B12 SERPL-MCNC: 1432 PG/ML (ref 100–900)

## 2021-02-11 PROCEDURE — 84443 ASSAY THYROID STIM HORMONE: CPT

## 2021-02-11 PROCEDURE — 80053 COMPREHEN METABOLIC PANEL: CPT

## 2021-02-11 PROCEDURE — 36415 COLL VENOUS BLD VENIPUNCTURE: CPT

## 2021-02-11 PROCEDURE — 82746 ASSAY OF FOLIC ACID SERUM: CPT

## 2021-02-11 PROCEDURE — 82607 VITAMIN B-12: CPT

## 2021-02-11 PROCEDURE — 83036 HEMOGLOBIN GLYCOSYLATED A1C: CPT

## 2021-02-12 DIAGNOSIS — Z23 ENCOUNTER FOR IMMUNIZATION: ICD-10-CM

## 2021-02-16 ENCOUNTER — TELEMEDICINE (OUTPATIENT)
Dept: FAMILY MEDICINE CLINIC | Facility: CLINIC | Age: 86
End: 2021-02-16
Payer: COMMERCIAL

## 2021-02-16 VITALS
OXYGEN SATURATION: 97 % | BODY MASS INDEX: 24.94 KG/M2 | DIASTOLIC BLOOD PRESSURE: 60 MMHG | HEART RATE: 67 BPM | HEIGHT: 60 IN | WEIGHT: 127 LBS | SYSTOLIC BLOOD PRESSURE: 138 MMHG | TEMPERATURE: 98.4 F

## 2021-02-16 DIAGNOSIS — N40.1 BENIGN PROSTATIC HYPERPLASIA WITH WEAK URINARY STREAM: ICD-10-CM

## 2021-02-16 DIAGNOSIS — R39.12 BENIGN PROSTATIC HYPERPLASIA WITH WEAK URINARY STREAM: ICD-10-CM

## 2021-02-16 DIAGNOSIS — E78.2 MIXED HYPERLIPIDEMIA: ICD-10-CM

## 2021-02-16 DIAGNOSIS — E03.9 ACQUIRED HYPOTHYROIDISM: ICD-10-CM

## 2021-02-16 DIAGNOSIS — R26.2 AMBULATORY DYSFUNCTION: ICD-10-CM

## 2021-02-16 DIAGNOSIS — E11.649 TYPE 2 DIABETES MELLITUS WITH HYPOGLYCEMIA WITHOUT COMA, WITHOUT LONG-TERM CURRENT USE OF INSULIN (HCC): ICD-10-CM

## 2021-02-16 DIAGNOSIS — F03.90 DEMENTIA WITHOUT BEHAVIORAL DISTURBANCE, UNSPECIFIED DEMENTIA TYPE (HCC): ICD-10-CM

## 2021-02-16 DIAGNOSIS — R32 URINARY INCONTINENCE, UNSPECIFIED TYPE: ICD-10-CM

## 2021-02-16 DIAGNOSIS — M19.90 ARTHRITIS: Primary | ICD-10-CM

## 2021-02-16 DIAGNOSIS — Z86.73 HISTORY OF CVA (CEREBROVASCULAR ACCIDENT): ICD-10-CM

## 2021-02-16 PROCEDURE — 99215 OFFICE O/P EST HI 40 MIN: CPT | Performed by: FAMILY MEDICINE

## 2021-02-16 RX ORDER — ACETAMINOPHEN 160 MG/5ML
640 SUSPENSION ORAL 2 TIMES DAILY
Qty: 473 ML | Refills: 5 | Status: SHIPPED | OUTPATIENT
Start: 2021-02-16 | End: 2021-03-27 | Stop reason: HOSPADM

## 2021-02-16 RX ORDER — SENNOSIDES 8.6 MG
650 CAPSULE ORAL EVERY 8 HOURS PRN
Qty: 90 TABLET | Refills: 3 | Status: SHIPPED | OUTPATIENT
Start: 2021-02-16 | End: 2021-02-16

## 2021-02-16 NOTE — PROGRESS NOTES
Virtual Regular Visit    Assessment/Plan:  1  Arthritis  Reviewed "stiffness" is likely from arthritis and not muscle spasm as they feared  Recommend active and passive ROM  Start Tylenol  They wish to try liquid  Start with daily, can increase to twice a day  Can also try over the counter muscle rubs  Reviewed risks of muscle relaxer's and why these are not recommended  - acetaminophen (TYLENOL) 160 mg/5 mL liquid; Take 20 mL (640 mg total) by mouth 2 (two) times a day  Dispense: 473 mL; Refill: 5    2  Type 2 diabetes mellitus with hypoglycemia without coma, without long-term current use of insulin (HCC)  HbA1C now too low  Will d/c Glipizide  Recommend watch sugars, may be able to d/c glucophage also  Goal A1C around 7 - 8  Reviewed importance of avoiding hypoglycemia  They request a glucose monitor that does not require finger sticks  Will also change glucohage to once a day dosing to ease in giving him his meds  - metFORMIN (Glucophage) 1000 MG tablet; Take 1 tablet (1,000 mg total) by mouth daily at bedtime  Dispense: 90 tablet; Refill: 1  - Continuous Blood Gluc  (FreeStyle Shanda 14 Day [place]) JESSICA; Use 1 Units once for 1 dose check sugars twice a day  E11 649  Dispense: 1 Device; Refill: 0  - Continuous Blood Gluc Sensor (FreeStyle Shanda 14 Day Sensor) MISC; Use 1 Units every 14 (fourteen) days check sugars twice a day  E11 649  Dispense: 10 each; Refill: 11    3  Urinary incontinence, unspecified type  Using flomax  No changes, no concerns  4  Dementia without behavioral disturbance, unspecified dementia type ([facility] )  Stable  OT visit really helped daughter with how to help care for patient and how to move him, shower him, roll him, avoid ulcers  They also taught her passive ROM exercises  5  Benign prostatic hyperplasia with weak urinary stream  On flomax  Tolerating well  6  Acquired hypothyroidism  Well controlled  7  Mixed hyperlipidemia  Offered to d/c Lipitor   Family wishes to stay with this for now  8  History of CVA (cerebrovascular accident)  No changes  9  Ambulatory dysfunction  Doing better with some PT        5 minutes spent on chart prep, 35 minutes spent with patient counseling/educating on their diagnoses, tests completed and any new tests ordered, any referrals placed, treatment options, and documentation of above today  In prescribing new medications, or changing doses, we reviewed the risks and benefits and side effects of these medications along with other treatment options if appropriate  Reviewed goals of treatment with both dtrs in family conference  Reviewed pros and cons of his current meds and ways to simplify his med list  Reviewed pros and cons of his medications  He does not want to swallow pills  Will try to eliminate some medicine  Offered to stop Lipitor as reasonable at age 82, but they decline at this time  Return in about 13 weeks (around 5/18/2021) for virtual visit: CC 3:20 pm & labs (40min)   Reason for visit is   Chief Complaint   Patient presents with    Follow-up       Encounter provider Nyasia Mejía DO    Provider located at [address] [place] [address] [address] [phone]    Recent Visits  Date Type Provider Dept   02/16/21 Telemedicine Reji Castillo DO [address] [facility] recent visits within past 7 days and meeting all other requirements     Future Appointments  No visits were found meeting these conditions  Showing future appointments within next 150 days and meeting all other requirements        The patient was identified by name and date of birth  Ellis Karimi was informed that this is a telemedicine visit and that the visit is being conducted through [employer] and patient was informed that this is a secure, HIPAA-compliant platform  He agrees to proceed     My office door was closed  No one else was in the room    He acknowledged consent and understanding of privacy and security of the video platform  The patient has agreed to participate and understands they can discontinue the visit at any time  Patient is aware this is a billable service  Subjective  Donna Arreola is a 82 y o  male is being seen via Video Visit today due to the COVID-19 pandemic  Chief Complaint   Patient presents with    Follow-up       Today's concerns are:    With daughter Ariel Sever and Charles Morocho also on the call   At home PT finished a few weeks ago   OT helped a lot   Feels like she has the right tools   Sore all healed up   Feels he is stuff   - wondering about acupuncture, reflexology, decreased range of motion   OT was more helpful   Saw Dr Nancy Mueller and brennan planned   Some issues swallowing medications     Vitals:    02/16/21 1651   BP: 138/60   Pulse: 67   Temp: 98 4 °F (36 9 °C)   SpO2: 97%   Weight: 57 6 kg (127 lb)   Height: 4' 8" (1 422 m)     Wt Readings from Last 3 Encounters:   02/17/21 57 6 kg (127 lb)   02/16/21 57 6 kg (127 lb)   01/12/21 57 6 kg (127 lb)     BP Readings from Last 3 Encounters:   02/17/21 138/60   02/16/21 138/60   01/12/21 128/77       PHQ-9 Depression Screening    PHQ-9:   Frequency of the following problems over the past two weeks:      Little interest or pleasure in doing things: 0 - not at all  Feeling down, depressed, or hopeless: 0 - not at all         Past Medical History:   Diagnosis Date    Allergic     Lactose: Shellfish    Asthma     Diabetes mellitus ([facility] )     Glaucoma        Past Surgical History:   Procedure Laterality Date    TESTICLE SURGERY         Current Outpatient Medications   Medication Sig Dispense Refill    atorvastatin (LIPITOR) 20 mg tablet Take 1 tablet (20 mg total) by mouth daily 90 tablet 2    glucose blood (Contour Next Test) test strip Testing BID  DX: E11 9   Contour Next blood glucose strips 100 each 5    levothyroxine 112 mcg tablet Take 1 tablet (112 mcg total) by mouth daily 30 tablet 5    Microlet Lancets MISC by Does not apply route 2 (two) times a day 100 each 5    mupirocin (BACTROBAN) 2 % ointment Apply topically 2 (two) times a day 30 g 1    nystatin (MYCOSTATIN) cream Apply topically 2 (two) times a day as needed (rash) 30 g 2    tamsulosin (FLOMAX) 0 4 mg Take 1 capsule (0 4 mg total) by mouth daily with dinner 30 capsule 5    acetaminophen (TYLENOL) 160 mg/5 mL liquid Take 20 mL (640 mg total) by mouth 2 (two) times a day 473 mL 5    Continuous Blood Gluc  (FreeStyle Shanda 14 Day [place]) JESSICA Use 1 Units once for 1 dose check sugars twice a day  E11 649  1 Device 0    Continuous Blood Gluc Sensor (FreeStyle Shanda 14 Day Sensor) MISC Use 1 Units every 14 (fourteen) days check sugars twice a day  E11 649 10 each 11    metFORMIN (Glucophage) 1000 MG tablet Take 1 tablet (1,000 mg total) by mouth daily at bedtime 90 tablet 1     No current facility-administered medications for this visit  Allergies   Allergen Reactions    Lactose     Shellfish-Derived Products Diarrhea       Review of Systems  Unable due to dementia  Per family all others negative - no chest pain, SOB, normal and bowels incontinence but regular  no GERD  sleeping well  mood good  They note stiffness  Physical Exam   Video Exam Pt not examined in person - seen over FaceTime   Constitutional:  he appears well-developed and well-nourished  HENT: Head: Normocephalic  Right Ear: External ear normal    Left Ear: External ear normal    Nose: Nose normal    Eyes: Pupils are equal, round, and reactive to light  Right eye exhibits no discharge  Left eye exhibits no discharge  No scleral icterus  Neck: Normal range of motion  Pulmonary/Chest: Effort normal  No respiratory distress  Neurological: he is alert and not oriented to person, place, and time  Daughter Shannan Leon moves his arms through range of motion and very limited, unable to move left shoulder beyond 90 degrees     Skin: Skin is warm and dry on face - no rashes  Not pale  Not diaphoretic  Psychiatric: he  has a normal mood and affect  he behavior is normal  Thought content normal        As a result of this visit, I have not referred the patient for further respiratory evaluation  VIRTUAL VISIT DISCLAIMER    Jalen Lopez acknowledges that he has consented to an online visit or consultation  He understands that the online visit is based solely on information provided by him, and that, in the absence of a face-to-face physical evaluation by the physician, the diagnosis he receives is both limited and provisional in terms of accuracy and completeness  This is not intended to replace a full medical face-to-face evaluation by the physician  Jalen Lopez understands and accepts these terms

## 2021-02-17 ENCOUNTER — OFFICE VISIT (OUTPATIENT)
Dept: UROLOGY | Facility: AMBULATORY SURGERY CENTER | Age: 86
End: 2021-02-17
Payer: COMMERCIAL

## 2021-02-17 VITALS
WEIGHT: 127 LBS | BODY MASS INDEX: 24.94 KG/M2 | HEART RATE: 82 BPM | HEIGHT: 60 IN | DIASTOLIC BLOOD PRESSURE: 60 MMHG | SYSTOLIC BLOOD PRESSURE: 138 MMHG

## 2021-02-17 DIAGNOSIS — N40.1 BENIGN PROSTATIC HYPERPLASIA WITH WEAK URINARY STREAM: ICD-10-CM

## 2021-02-17 DIAGNOSIS — R39.12 BENIGN PROSTATIC HYPERPLASIA WITH WEAK URINARY STREAM: ICD-10-CM

## 2021-02-17 PROCEDURE — 1160F RVW MEDS BY RX/DR IN RCRD: CPT | Performed by: NURSE PRACTITIONER

## 2021-02-17 PROCEDURE — 99214 OFFICE O/P EST MOD 30 MIN: CPT | Performed by: NURSE PRACTITIONER

## 2021-02-17 PROCEDURE — 1036F TOBACCO NON-USER: CPT | Performed by: NURSE PRACTITIONER

## 2021-02-17 RX ORDER — FLASH GLUCOSE SENSOR
1 KIT MISCELLANEOUS
Qty: 10 EACH | Refills: 11 | Status: SHIPPED | OUTPATIENT
Start: 2021-02-17 | End: 2021-04-13

## 2021-02-17 RX ORDER — TAMSULOSIN HYDROCHLORIDE 0.4 MG/1
0.4 CAPSULE ORAL
Qty: 90 CAPSULE | Refills: 3 | Status: SHIPPED | OUTPATIENT
Start: 2021-02-17

## 2021-02-17 RX ORDER — FLASH GLUCOSE SCANNING READER
1 EACH MISCELLANEOUS ONCE
Qty: 1 DEVICE | Refills: 0 | Status: SHIPPED | OUTPATIENT
Start: 2021-02-17 | End: 2021-02-17

## 2021-02-17 NOTE — PROGRESS NOTES
2/17/2021      Chief Complaint   Patient presents with    Urinary Incontinence     Assessment and Plan    82 y o  male managed by Dr Betty Acuña    1  Urinary incontinence  · Mixed- functional/urge  ·  consider use of urinal to assist with inability to get to the bathroom  · Will continue to monitor off anticholinergic agents  ·  maintain adequate hydration upwards to 40 oz of water intake per day  ·  avoid bladder irritating foods and beverages  ·   Bladder scan PVR 47 mL  ·  will follow-up in the office in 1 year with bladder scan PVR  ·   Discussed plan with family he was present in the room as well as on speaker phone on a cellular phone    History of Present Illness  Miguel Morin is a 82 y o  male here for follow up evaluation of urinary incontinence  Patient with an extensive history of dementia and cognitive decline  As his dementia worsens he continues to have worsening episodes of urinary and fecal incontinence  Since his last office evaluation this urine mervin gist has discontinued trospium given the possible side effects for confusion  He is currently managed on tamsulosin 0 4 mg p o  daily for mild benign prostatic hyperplasia  At this point, there is been no worsening of his urinary symptoms since his previous office visit  He reports sensation of complete bladder emptying with urination  He denies dysuria and there is no hematuria  Review of Systems   Constitutional: Negative for chills and fever  Respiratory: Negative for cough and shortness of breath  Cardiovascular: Negative for chest pain  Gastrointestinal: Negative for abdominal distention, abdominal pain, blood in stool, nausea and vomiting  Genitourinary: Negative for difficulty urinating, dysuria, enuresis, flank pain, frequency, hematuria and urgency  Skin: Negative for rash       Past Medical History  Past Medical History:   Diagnosis Date    Allergic     Lactose: Shellfish    Asthma     Diabetes mellitus ([facility] )     Glaucoma        Past Social History  Past Surgical History:   Procedure Laterality Date    TESTICLE SURGERY       Social History     Tobacco Use   Smoking Status Never Smoker   Smokeless Tobacco Never Used       Past Family History  Family History   Problem Relation Age of Onset    No Known Problems Mother     No Known Problems Father     No Known Problems Sister     No Known Problems Brother     No Known Problems Daughter     No Known Problems Sister     No Known Problems Sister     No Known Problems Brother     No Known Problems Brother     No Known Problems Brother     No Known Problems Brother     No Known Problems Daughter     No Known Problems Daughter     Colon cancer Neg Hx        Past Social history  Social History     Socioeconomic History    Marital status:       Spouse name: Not on file    Number of children: 3    Years of education: Not on file    Highest education level: Not on file   Occupational History    Occupation: retired    Social Needs    Financial resource strain: Not on file    Food insecurity     Worry: Not on file     Inability: Not on file   [employer] needs     Medical: Not on file     Non-medical: Not on file   Tobacco Use    Smoking status: Never Smoker    Smokeless tobacco: Never Used   Substance and Sexual Activity    Alcohol use: Not Currently     Alcohol/week: 0 0 standard drinks    Drug use: Never    Sexual activity: Not Currently     Partners: Female     Birth control/protection: Female Sterilization   Lifestyle    Physical activity     Days per week: Not on file     Minutes per session: Not on file    Stress: Not on file   Relationships    Social connections     Talks on phone: Not on file     Gets together: Not on file     Attends Restorationism service: Not on file     Active member of club or organization: Not on file     Attends meetings of clubs or organizations: Not on file     Relationship status: Not on file    Intimate partner violence     Fear of current or ex partner: Not on file     Emotionally abused: Not on file     Physically abused: Not on file     Forced sexual activity: Not on file   Other Topics Concern    Not on file   Social History Narrative    Not on file       Current Medications  Current Outpatient Medications   Medication Sig Dispense Refill    acetaminophen (TYLENOL) 160 mg/5 mL liquid Take 20 mL (640 mg total) by mouth 2 (two) times a day 473 mL 5    atorvastatin (LIPITOR) 20 mg tablet Take 1 tablet (20 mg total) by mouth daily 90 tablet 2    glucose blood (Contour Next Test) test strip Testing BID  DX: E11 9  Contour Next blood glucose strips 100 each 5    levothyroxine 112 mcg tablet Take 1 tablet (112 mcg total) by mouth daily 30 tablet 5    metFORMIN (Glucophage) 1000 MG tablet Take 1 tablet (1,000 mg total) by mouth daily at bedtime 90 tablet 1    Microlet Lancets MISC by Does not apply route 2 (two) times a day 100 each 5    mupirocin (BACTROBAN) 2 % ointment Apply topically 2 (two) times a day 30 g 1    nystatin (MYCOSTATIN) cream Apply topically 2 (two) times a day as needed (rash) 30 g 2    tamsulosin (FLOMAX) 0 4 mg Take 1 capsule (0 4 mg total) by mouth daily with dinner 90 capsule 3    Continuous Blood Gluc  (FreeStyle Shanda 14 Day [place]) JESSICA Use 1 Units once for 1 dose check sugars twice a day  E11 649  1 Device 0    Continuous Blood Gluc Sensor (FreeStyle Shanda 14 Day Sensor) MISC Use 1 Units every 14 (fourteen) days check sugars twice a day  E11 649 10 each 11     No current facility-administered medications for this visit          Allergies  Allergies   Allergen Reactions    Lactose     Shellfish-Derived Products Diarrhea         The following portions of the patient's history were reviewed and updated as appropriate: allergies, current medications, past medical history, past social history, past surgical history and problem list       Vitals  Vitals:    02/17/21 1441   BP: 138/60 Pulse: 82   Weight: 57 6 kg (127 lb)   Height: 4' 8" (1 422 m)       Physical Exam  Physical Exam  Vitals signs reviewed  Constitutional:       General: He is not in acute distress  Appearance: Normal appearance  He is normal weight  HENT:      Head: Normocephalic  Eyes:      Pupils: Pupils are equal, round, and reactive to light  Cardiovascular:      Rate and Rhythm: Normal rate  Pulmonary:      Effort: No respiratory distress  Breath sounds: Normal breath sounds  Skin:     General: Skin is warm and dry  Neurological:      General: No focal deficit present  Mental Status: He is alert and oriented to person, place, and time  Psychiatric:         Mood and Affect: Mood normal          Behavior: Behavior normal        Results  No results found for this or any previous visit (from the past 1 hour(s))  ]  No results found for: PSA  Lab Results   Component Value Date    CALCIUM 9 5 02/11/2021    K 3 7 02/11/2021    CO2 28 02/11/2021     02/11/2021    BUN 14 02/11/2021    CREATININE 0 64 02/11/2021     Lab Results   Component Value Date    WBC 8 24 12/24/2020    HGB 12 5 12/24/2020    HCT 38 0 12/24/2020    MCV 94 12/24/2020     12/24/2020     Orders  No orders of the defined types were placed in this encounter        JESSICA Herrmann

## 2021-02-23 ENCOUNTER — ANESTHESIA EVENT (OUTPATIENT)
Dept: GASTROENTEROLOGY | Facility: HOSPITAL | Age: 86
End: 2021-02-23

## 2021-02-23 NOTE — ANESTHESIA PREPROCEDURE EVALUATION
Procedure:  COLONOSCOPY    ECG: ST otherwise normal  Hx of severe dementia, bowel/bladde incontence     Wheelchair bound at baseline  Has not used inhaler >1 year  Lives with daughter and POA    Relevant Problems   CARDIO   (+) Mixed hyperlipidemia      ENDO   (+) Acquired hypothyroidism   (+) Type 2 diabetes mellitus without complication, without long-term current use of insulin (HCC)      NEURO/PSYCH   (+) Dementia without behavioral disturbance (HCC)   (+) History of CVA (cerebrovascular accident)      PULMONARY   (+) Mild intermittent asthma without complication        Physical Exam    Airway    Mallampati score: III  TM Distance: >3 FB  Neck ROM: full     Dental       Cardiovascular      Pulmonary      Other Findings        Anesthesia Plan  ASA Score- 3     Anesthesia Type- IV sedation with anesthesia with ASA Monitors  Additional Monitors:   Airway Plan:           Plan Factors-Exercise tolerance (METS): >4 METS  Chart reviewed  EKG reviewed  Existing labs reviewed  Patient summary reviewed  Patient is not a current smoker  Induction-     Postoperative Plan-     Informed Consent- Anesthetic plan and risks discussed with patient  I personally reviewed this patient with the CRNA  Discussed and agreed on the Anesthesia Plan with the CRNA  Meseret Console

## 2021-02-24 ENCOUNTER — ANESTHESIA (OUTPATIENT)
Dept: GASTROENTEROLOGY | Facility: HOSPITAL | Age: 86
End: 2021-02-24

## 2021-02-24 ENCOUNTER — PATIENT MESSAGE (OUTPATIENT)
Dept: FAMILY MEDICINE CLINIC | Facility: CLINIC | Age: 86
End: 2021-02-24

## 2021-02-24 ENCOUNTER — HOSPITAL ENCOUNTER (OUTPATIENT)
Dept: GASTROENTEROLOGY | Facility: HOSPITAL | Age: 86
Setting detail: OUTPATIENT SURGERY
Discharge: HOME | End: 2021-02-24
Attending: COLON & RECTAL SURGERY
Payer: COMMERCIAL

## 2021-02-24 VITALS
TEMPERATURE: 97.7 F | WEIGHT: 127 LBS | BODY MASS INDEX: 24.94 KG/M2 | HEIGHT: 60 IN | DIASTOLIC BLOOD PRESSURE: 82 MMHG | SYSTOLIC BLOOD PRESSURE: 174 MMHG | RESPIRATION RATE: 18 BRPM | OXYGEN SATURATION: 99 % | HEART RATE: 57 BPM

## 2021-02-24 VITALS — HEART RATE: 51 BPM

## 2021-02-24 DIAGNOSIS — Z12.11 SCREENING FOR COLON CANCER: ICD-10-CM

## 2021-02-24 LAB — GLUCOSE SERPL-MCNC: 138 MG/DL (ref 65–140)

## 2021-02-24 PROCEDURE — 82948 REAGENT STRIP/BLOOD GLUCOSE: CPT

## 2021-02-24 PROCEDURE — G0121 COLON CA SCRN NOT HI RSK IND: HCPCS | Performed by: COLON & RECTAL SURGERY

## 2021-02-24 RX ORDER — LIDOCAINE HYDROCHLORIDE 10 MG/ML
INJECTION, SOLUTION EPIDURAL; INFILTRATION; INTRACAUDAL; PERINEURAL AS NEEDED
Status: DISCONTINUED | OUTPATIENT
Start: 2021-02-24 | End: 2021-02-24

## 2021-02-24 RX ORDER — SODIUM CHLORIDE 9 MG/ML
INJECTION, SOLUTION INTRAVENOUS CONTINUOUS PRN
Status: DISCONTINUED | OUTPATIENT
Start: 2021-02-24 | End: 2021-02-24

## 2021-02-24 RX ORDER — PROPOFOL 10 MG/ML
INJECTION, EMULSION INTRAVENOUS AS NEEDED
Status: DISCONTINUED | OUTPATIENT
Start: 2021-02-24 | End: 2021-02-24

## 2021-02-24 RX ADMIN — LIDOCAINE HYDROCHLORIDE 50 MG: 10 INJECTION, SOLUTION EPIDURAL; INFILTRATION; INTRACAUDAL; PERINEURAL at 12:10

## 2021-02-24 RX ADMIN — PROPOFOL 20 MG: 10 INJECTION, EMULSION INTRAVENOUS at 12:15

## 2021-02-24 RX ADMIN — PROPOFOL 20 MG: 10 INJECTION, EMULSION INTRAVENOUS at 12:12

## 2021-02-24 RX ADMIN — SODIUM CHLORIDE: 0.9 INJECTION, SOLUTION INTRAVENOUS at 12:01

## 2021-02-24 RX ADMIN — PROPOFOL 40 MG: 10 INJECTION, EMULSION INTRAVENOUS at 12:10

## 2021-02-24 NOTE — TELEPHONE ENCOUNTER
From: Ina Ramirez  To: Aj Covarrubias   Sent: 2021 3:32 PM EST  Subject: Non-Urgent Medical Question    Hi Dr Cecilia Howard,    My dad and dad your patient, Ina Ramirez  1935, had a colonoscopy today and results were fine  Nurses found a rash on his arms, chest and torso after the procedure (wasn't there before), so they had anesthesiologist look at it  He said it's not related to anesthesia but may have to do with hospital gown  They use strong detergent to get rid of germs  My dad has very sensitive skin  I was told to keep an eye out to see if rash worsens, he gets hives or has difficulty breathing (go to ER)  Do you recommend liquid Benadryl or something else? Please me know  Thanks for your help      Sylvia Alfred  [phone]

## 2021-02-24 NOTE — ANESTHESIA POSTPROCEDURE EVALUATION
Post-Op Assessment Note    CV Status:  Stable  Pain Score: 0    Pain management: adequate     Mental Status:  Awake, sleepy and arousable   Hydration Status:  Euvolemic   PONV Controlled:  Controlled   Airway Patency:  Patent      Post Op Vitals Reviewed: Yes      Staff: CRNA         No complications documented      /67 (02/24/21 1223)    Temp      Pulse 55 (02/24/21 1223)   Resp 18 (02/24/21 1223)    SpO2 100 % (02/24/21 1223)

## 2021-03-11 ENCOUNTER — PATIENT OUTREACH (OUTPATIENT)
Dept: FAMILY MEDICINE CLINIC | Facility: CLINIC | Age: 86
End: 2021-03-11

## 2021-03-11 NOTE — PROGRESS NOTES
Telephone call placed to patient's daughter, Vu Pugh to provide follow up communication  Vu Pugh informed me that patient is doing well and was admitted to [facility]  Patient goes weekly to the center for Physical Therapy  Vu Pugh reports that patient will have an aid coming to the house next week to assist him with bathing  Patient will be going to the center daily once they open up fully  She reports that patient is very social and will enjoy the interaction with others  Vu Pugh informed me that patient will be getting the Covid vaccine but is awaiting the [employer] one dose vaccine  Vu Pugh denies having any other needs at this time and agrees to call me if she requires any assistance in the future  I am closing the socially complex episode at this time as all goals are met

## 2021-03-24 ENCOUNTER — HOSPITAL ENCOUNTER (INPATIENT)
Facility: HOSPITAL | Age: 86
LOS: 2 days | Discharge: NURSING, REHAB OR LONG-TERM CARE | DRG: 316 | End: 2021-03-27
Attending: EMERGENCY MEDICINE | Admitting: GENERAL PRACTICE
Payer: MEDICARE

## 2021-03-24 ENCOUNTER — APPOINTMENT (EMERGENCY)
Dept: RADIOLOGY | Facility: HOSPITAL | Age: 86
DRG: 316 | End: 2021-03-24
Payer: MEDICARE

## 2021-03-24 ENCOUNTER — APPOINTMENT (OUTPATIENT)
Dept: RADIOLOGY | Facility: HOSPITAL | Age: 86
DRG: 316 | End: 2021-03-24
Payer: MEDICARE

## 2021-03-24 DIAGNOSIS — R55 SYNCOPE: Primary | ICD-10-CM

## 2021-03-24 DIAGNOSIS — M19.90 ARTHRITIS: ICD-10-CM

## 2021-03-24 DIAGNOSIS — I95.9 HYPOTENSION: ICD-10-CM

## 2021-03-24 LAB
ANION GAP SERPL CALCULATED.3IONS-SCNC: 5 MMOL/L (ref 4–13)
ATRIAL RATE: 64 BPM
ATRIAL RATE: 67 BPM
BASOPHILS # BLD AUTO: 0.05 THOUSANDS/ΜL (ref 0–0.1)
BASOPHILS NFR BLD AUTO: 1 % (ref 0–1)
BUN SERPL-MCNC: 22 MG/DL (ref 5–25)
CALCIUM SERPL-MCNC: 9.3 MG/DL (ref 8.3–10.1)
CHLORIDE SERPL-SCNC: 103 MMOL/L (ref 100–108)
CO2 SERPL-SCNC: 29 MMOL/L (ref 21–32)
CREAT SERPL-MCNC: 1.04 MG/DL (ref 0.6–1.3)
EOSINOPHIL # BLD AUTO: 1.55 THOUSAND/ΜL (ref 0–0.61)
EOSINOPHIL NFR BLD AUTO: 17 % (ref 0–6)
ERYTHROCYTE [DISTWIDTH] IN BLOOD BY AUTOMATED COUNT: 13.9 % (ref 11.6–15.1)
GFR SERPL CREATININE-BSD FRML MDRD: 66 ML/MIN/1.73SQ M
GLUCOSE SERPL-MCNC: 199 MG/DL (ref 65–140)
HCT VFR BLD AUTO: 37.7 % (ref 36.5–49.3)
HGB BLD-MCNC: 12.3 G/DL (ref 12–17)
IMM GRANULOCYTES # BLD AUTO: 0.02 THOUSAND/UL (ref 0–0.2)
IMM GRANULOCYTES NFR BLD AUTO: 0 % (ref 0–2)
LYMPHOCYTES # BLD AUTO: 2.73 THOUSANDS/ΜL (ref 0.6–4.47)
LYMPHOCYTES NFR BLD AUTO: 29 % (ref 14–44)
MCH RBC QN AUTO: 30.6 PG (ref 26.8–34.3)
MCHC RBC AUTO-ENTMCNC: 32.6 G/DL (ref 31.4–37.4)
MCV RBC AUTO: 94 FL (ref 82–98)
MONOCYTES # BLD AUTO: 0.72 THOUSAND/ΜL (ref 0.17–1.22)
MONOCYTES NFR BLD AUTO: 8 % (ref 4–12)
NEUTROPHILS # BLD AUTO: 4.3 THOUSANDS/ΜL (ref 1.85–7.62)
NEUTS SEG NFR BLD AUTO: 45 % (ref 43–75)
NRBC BLD AUTO-RTO: 0 /100 WBCS
P AXIS: 65 DEGREES
P AXIS: 67 DEGREES
PLATELET # BLD AUTO: 259 THOUSANDS/UL (ref 149–390)
PMV BLD AUTO: 10.2 FL (ref 8.9–12.7)
POTASSIUM SERPL-SCNC: 4.2 MMOL/L (ref 3.5–5.3)
PR INTERVAL: 180 MS
PR INTERVAL: 216 MS
QRS AXIS: 80 DEGREES
QRS AXIS: 82 DEGREES
QRSD INTERVAL: 84 MS
QRSD INTERVAL: 86 MS
QT INTERVAL: 408 MS
QT INTERVAL: 416 MS
QTC INTERVAL: 420 MS
QTC INTERVAL: 439 MS
RBC # BLD AUTO: 4.02 MILLION/UL (ref 3.88–5.62)
SODIUM SERPL-SCNC: 137 MMOL/L (ref 136–145)
T WAVE AXIS: 78 DEGREES
T WAVE AXIS: 80 DEGREES
TROPONIN I SERPL-MCNC: <0.02 NG/ML
VENTRICULAR RATE: 64 BPM
VENTRICULAR RATE: 67 BPM
WBC # BLD AUTO: 9.37 THOUSAND/UL (ref 4.31–10.16)

## 2021-03-24 PROCEDURE — 93005 ELECTROCARDIOGRAM TRACING: CPT

## 2021-03-24 PROCEDURE — 99285 EMERGENCY DEPT VISIT HI MDM: CPT

## 2021-03-24 PROCEDURE — 85025 COMPLETE CBC W/AUTO DIFF WBC: CPT | Performed by: EMERGENCY MEDICINE

## 2021-03-24 PROCEDURE — 36415 COLL VENOUS BLD VENIPUNCTURE: CPT | Performed by: EMERGENCY MEDICINE

## 2021-03-24 PROCEDURE — 80048 BASIC METABOLIC PNL TOTAL CA: CPT | Performed by: EMERGENCY MEDICINE

## 2021-03-24 PROCEDURE — 70450 CT HEAD/BRAIN W/O DYE: CPT

## 2021-03-24 PROCEDURE — 71045 X-RAY EXAM CHEST 1 VIEW: CPT

## 2021-03-24 PROCEDURE — G1004 CDSM NDSC: HCPCS

## 2021-03-24 PROCEDURE — 99285 EMERGENCY DEPT VISIT HI MDM: CPT | Performed by: EMERGENCY MEDICINE

## 2021-03-24 PROCEDURE — 84484 ASSAY OF TROPONIN QUANT: CPT | Performed by: EMERGENCY MEDICINE

## 2021-03-24 PROCEDURE — 99220 PR INITIAL OBSERVATION CARE/DAY 70 MINUTES: CPT | Performed by: INTERNAL MEDICINE

## 2021-03-24 PROCEDURE — 93010 ELECTROCARDIOGRAM REPORT: CPT | Performed by: INTERNAL MEDICINE

## 2021-03-24 RX ORDER — ACETAMINOPHEN 325 MG/1
650 TABLET ORAL EVERY 6 HOURS PRN
Status: DISCONTINUED | OUTPATIENT
Start: 2021-03-24 | End: 2021-03-27 | Stop reason: HOSPADM

## 2021-03-24 RX ORDER — LEVOTHYROXINE SODIUM 112 UG/1
112 TABLET ORAL DAILY
Status: DISCONTINUED | OUTPATIENT
Start: 2021-03-25 | End: 2021-03-27 | Stop reason: HOSPADM

## 2021-03-24 RX ORDER — TAMSULOSIN HYDROCHLORIDE 0.4 MG/1
0.4 CAPSULE ORAL
Status: DISCONTINUED | OUTPATIENT
Start: 2021-03-25 | End: 2021-03-27 | Stop reason: HOSPADM

## 2021-03-24 RX ORDER — SODIUM CHLORIDE 9 MG/ML
3 INJECTION INTRAVENOUS
Status: DISCONTINUED | OUTPATIENT
Start: 2021-03-24 | End: 2021-03-27 | Stop reason: HOSPADM

## 2021-03-24 RX ORDER — ONDANSETRON 2 MG/ML
4 INJECTION INTRAMUSCULAR; INTRAVENOUS EVERY 6 HOURS PRN
Status: DISCONTINUED | OUTPATIENT
Start: 2021-03-24 | End: 2021-03-27 | Stop reason: HOSPADM

## 2021-03-24 RX ORDER — LISINOPRIL AND HYDROCHLOROTHIAZIDE 12.5; 1 MG/1; MG/1
1 TABLET ORAL DAILY
COMMUNITY
End: 2021-03-27 | Stop reason: HOSPADM

## 2021-03-24 RX ORDER — ATORVASTATIN CALCIUM 20 MG/1
20 TABLET, FILM COATED ORAL DAILY
Status: DISCONTINUED | OUTPATIENT
Start: 2021-03-25 | End: 2021-03-27 | Stop reason: HOSPADM

## 2021-03-24 NOTE — ED ATTENDING ATTESTATION
3/24/2021  ITaj MD, saw and evaluated the patient  I have discussed the patient with the resident/non-physician practitioner and agree with the resident's/non-physician practitioner's findings, Plan of Care, and MDM as documented in the resident's/non-physician practitioner's note, except where noted  All available labs and Radiology studies were reviewed  I was present for key portions of any procedure(s) performed by the resident/non-physician practitioner and I was immediately available to provide assistance  At this point I agree with the current assessment done in the Emergency Department  I have conducted an independent evaluation of this patient a history and physical is as follows:    ED Course     29-year-old male, history of dementia, unable to provide any history secondary to underlying dementia, review of systems is not reliable secondary to underlying dementia, presenting to the emergency department for a witnessed syncopal episode that occurred while the patient was seated in a wheelchair  Patient reportedly became unresponsive and was difficult to arouse  Initial blood pressure was hypotensive  Patient is without complaints currently  Elderly male sitting upright in the stretcher no acute distress  Head is normocephalic atraumatic  Eyelids lashes normal   No conjunctival erythema, no scleral icterus  No JVD, trachea is midline  Lungs are clear to auscultation bilaterally with no wheezes rales or rhonchi  Heart is regular rate rhythm with no murmurs rubs or gallops  Abdomen is nondistended, soft and nontender  Extremities are unremarkable  Motor is 5/5 bilateral upper lower extremities  Single episode in a patient who was found initially to be hypotensive  Unclear etiology  Given patient's age and comorbidities, plan to admit for observation      Labs Reviewed   CBC AND DIFFERENTIAL - Abnormal       Result Value Ref Range Status    WBC 9 37  4 31 - 10 16 Thousand/uL Final    RBC 4 02  3 88 - 5 62 Million/uL Final    Hemoglobin 12 3  12 0 - 17 0 g/dL Final    Hematocrit 37 7  36 5 - 49 3 % Final    MCV 94  82 - 98 fL Final    MCH 30 6  26 8 - 34 3 pg Final    MCHC 32 6  31 4 - 37 4 g/dL Final    RDW 13 9  11 6 - 15 1 % Final    MPV 10 2  8 9 - 12 7 fL Final    Platelets 724  402 - 390 Thousands/uL Final    nRBC 0  /100 WBCs Final    Neutrophils Relative 45  43 - 75 % Final    Immat GRANS % 0  0 - 2 % Final    Lymphocytes Relative 29  14 - 44 % Final    Monocytes Relative 8  4 - 12 % Final    Eosinophils Relative 17 (*) 0 - 6 % Final    Basophils Relative 1  0 - 1 % Final    Neutrophils Absolute 4 30  1 85 - 7 62 Thousands/µL Final    Immature Grans Absolute 0 02  0 00 - 0 20 Thousand/uL Final    Lymphocytes Absolute 2 73  0 60 - 4 47 Thousands/µL Final    Monocytes Absolute 0 72  0 17 - 1 22 Thousand/µL Final    Eosinophils Absolute 1 55 (*) 0 00 - 0 61 Thousand/µL Final    Basophils Absolute 0 05  0 00 - 0 10 Thousands/µL Final   BASIC METABOLIC PANEL - Abnormal    Sodium 137  136 - 145 mmol/L Final    Potassium 4 2  3 5 - 5 3 mmol/L Final    Comment: Slightly Hemolyzed; Results May be Affected    Chloride 103  100 - 108 mmol/L Final    CO2 29  21 - 32 mmol/L Final    ANION GAP 5  4 - 13 mmol/L Final    BUN 22  5 - 25 mg/dL Final    Creatinine 1 04  0 60 - 1 30 mg/dL Final    Comment: Standardized to IDMS reference method    Glucose 199 (*) 65 - 140 mg/dL Final    Comment: If the patient is fasting, the ADA then defines impaired fasting glucose as > 100 mg/dL and diabetes as > or equal to 123 mg/dL  Specimen collection should occur prior to Sulfasalazine administration due to the potential for falsely depressed results  Specimen collection should occur prior to Sulfapyridine administration due to the potential for falsely elevated results      Calcium 9 3  8 3 - 10 1 mg/dL Final    eGFR 66  ml/min/1 73sq m Final    Narrative:     [place] guidelines for Chronic Kidney Disease (CKD):     Stage 1 with normal or high GFR (GFR > 90 mL/min/1 73 square meters)    Stage 2 Mild CKD (GFR = 60-89 mL/min/1 73 square meters)    Stage 3A Moderate CKD (GFR = 45-59 mL/min/1 73 square meters)    Stage 3B Moderate CKD (GFR = 30-44 mL/min/1 73 square meters)    Stage 4 Severe CKD (GFR = 15-29 mL/min/1 73 square meters)    Stage 5 End Stage CKD (GFR <15 mL/min/1 73 square meters)  Note: GFR calculation is accurate only with a steady state creatinine   TROPONIN I - Normal    Troponin I <0 02  <=0 04 ng/mL Final    Comment: Siemens Chemistry analyzer 99% cutoff is > 0 04 ng/mL in network labs     o cTnI 99% cutoff is useful only when applied to patients in the clinical setting of myocardial ischemia   o cTnI 99% cutoff should be interpreted in the context of clinical history, ECG findings and possibly cardiac imaging to establish correct diagnosis  o cTnI 99% cutoff may be suggestive but clearly not indicative of a coronary event without the clinical setting of myocardial ischemia  X-ray chest 1 view portable   ED Interpretation   No acute cardiopulmonary disease      Final Result   No acute cardiopulmonary disease        Findings are stable            Workstation performed: [id number]                 Critical Care Time  Procedures

## 2021-03-24 NOTE — ED PROVIDER NOTES
History  Chief Complaint   Patient presents with    Syncope     [facility] a [facility]opal episode observed by staff  pt has dementia  hypotensive for EMS  recently started taking lisinopril       82-year-old male with history of diabetes, hypothyroidism, and dementia presents for possible syncopal episode  Patient is a poor historian secondary to dementia  Per EMS report, patient was at [employer] when staff noticed him slumped over in his wheelchair  On EMS arrival he was hypotensive to SBP in high 80s and diaphoretic  He returned to baseline en route  He does not endorse any complaints at this time  Prior to Admission Medications   Prescriptions Last Dose Informant Patient Reported? Taking? Continuous Blood Gluc Sensor (FreeStyle Shanda 14 Day Sensor) MISC   No No   Sig: Use 1 Units every 14 (fourteen) days check sugars twice a day  E11 649   GLIPIZIDE PO   Yes No   Sig: Take by mouth   Microlet Lancets MISC  Self No No   Sig: by Does not apply route 2 (two) times a day   acetaminophen (TYLENOL) 160 mg/5 mL liquid  Self No No   Sig: Take 20 mL (640 mg total) by mouth 2 (two) times a day   atorvastatin (LIPITOR) 20 mg tablet  Self No No   Sig: Take 1 tablet (20 mg total) by mouth daily   glucose blood (Contour Next Test) test strip  Self No No   Sig: Testing BID  DX: E11 9   Contour Next blood glucose strips   levothyroxine 112 mcg tablet  Self No No   Sig: Take 1 tablet (112 mcg total) by mouth daily   lisinopril-hydrochlorothiazide (PRINZIDE,ZESTORETIC) 10-12 5 MG per tablet   Yes Yes   Sig: Take 1 tablet by mouth daily   metFORMIN (Glucophage) 1000 MG tablet  Self No No   Sig: Take 1 tablet (1,000 mg total) by mouth daily at bedtime   mupirocin (BACTROBAN) 2 % ointment  Self No No   Sig: Apply topically 2 (two) times a day   nystatin (MYCOSTATIN) cream  Self No No   Sig: Apply topically 2 (two) times a day as needed (rash)   tamsulosin (FLOMAX) 0 4 mg   No No   Sig: Take 1 capsule (0 4 mg total) by mouth daily with dinner      Facility-Administered Medications: None       Past Medical History:   Diagnosis Date    Allergic     Lactose: Shellfish    Asthma     Dementia ([facility] [address] )     Diabetes mellitus ([facility] )     Glaucoma        Past Surgical History:   Procedure Laterality Date    TESTICLE SURGERY         Family History   Problem Relation Age of Onset    No Known Problems Mother     No Known Problems Father     No Known Problems Sister     No Known Problems Brother     No Known Problems Daughter     No Known Problems Sister     No Known Problems Sister     No Known Problems Brother     No Known Problems Brother     No Known Problems Brother     No Known Problems Brother     No Known Problems Daughter     No Known Problems Daughter     Colon cancer Neg Hx      I have reviewed and agree with the history as documented  E-Cigarette/Vaping    E-Cigarette Use Never User      E-Cigarette/Vaping Substances    Nicotine No     THC No     CBD No     Flavoring No     Other No     Unknown No      Social History     Tobacco Use    Smoking status: Never Smoker    Smokeless tobacco: Never Used   Substance Use Topics    Alcohol use: Not Currently     Alcohol/week: 0 0 standard drinks    Drug use: Never        Review of Systems   Unable to perform ROS: Dementia       Physical Exam  ED Triage Vitals   Temp Pulse Respirations Blood Pressure SpO2   -- 03/24/21 1357 03/24/21 1357 03/24/21 1357 03/24/21 1357    68 16 124/58 98 %      Temp src Heart Rate Source Patient Position - Orthostatic VS BP Location FiO2 (%)   -- 03/24/21 1357 03/24/21 1430 03/24/21 1430 --    Monitor Lying Right arm       Pain Score       --                    Orthostatic Vital Signs  Vitals:    03/24/21 1357 03/24/21 1430   BP: 124/58 113/56   Pulse: 68 62   Patient Position - Orthostatic VS:  Lying       Physical Exam  Vitals signs and nursing note reviewed     Constitutional:       General: He is not in acute distress  Appearance: He is well-developed  HENT:      Head: Normocephalic and atraumatic  Mouth/Throat:      Mouth: Mucous membranes are moist    Eyes:      Pupils: Pupils are equal, round, and reactive to light  Neck:      Musculoskeletal: Normal range of motion and neck supple  Cardiovascular:      Rate and Rhythm: Normal rate and regular rhythm  Heart sounds: Normal heart sounds  No murmur  No friction rub  No gallop  Pulmonary:      Effort: Pulmonary effort is normal  No respiratory distress  Breath sounds: Normal breath sounds  No stridor  No wheezing, rhonchi or rales  Abdominal:      Palpations: Abdomen is soft  Tenderness: There is no abdominal tenderness  There is no guarding or rebound  Musculoskeletal: Normal range of motion  General: No swelling or tenderness  Right lower leg: No edema  Left lower leg: No edema  Skin:     General: Skin is warm and dry  Capillary Refill: Capillary refill takes less than 2 seconds  Neurological:      General: No focal deficit present  Mental Status: He is alert  Comments: Able to tell me his name, unable to tell me year or place           ED Medications  Medications   sodium chloride (PF) 0 9 % injection 3 mL (has no administration in time range)       Diagnostic Studies  Results Reviewed     Procedure Component Value Units Date/Time    Troponin I [[id number]]  (Normal) Collected: 03/24/21 1424    Lab Status: Final result Specimen: Blood from Arm, Left Updated: 03/24/21 1522     Troponin I <0 02 ng/mL     Basic metabolic panel [[id number]]  (Abnormal) Collected: 03/24/21 1424    Lab Status: Final result Specimen: Blood from Arm, Left Updated: 03/24/21 1518     Sodium 137 mmol/L      Potassium 4 2 mmol/L      Chloride 103 mmol/L      CO2 29 mmol/L      ANION GAP 5 mmol/L      BUN 22 mg/dL      Creatinine 1 04 mg/dL      Glucose 199 mg/dL      Calcium 9 3 mg/dL      eGFR 66 ml/min/1 73sq m     Narrative: National Kid[employer] guidelines for Chronic Kidney Disease (CKD):     Stage 1 with normal or high GFR (GFR > 90 mL/min/1 73 square meters)    Stage 2 Mild CKD (GFR = 60-89 mL/min/1 73 square meters)    Stage 3A Moderate CKD (GFR = 45-59 mL/min/1 73 square meters)    Stage 3B Moderate CKD (GFR = 30-44 mL/min/1 73 square meters)    Stage 4 Severe CKD (GFR = 15-29 mL/min/1 73 square meters)    Stage 5 End Stage CKD (GFR <15 mL/min/1 73 square meters)  Note: GFR calculation is accurate only with a steady state creatinine    CBC and differential [[id number]]  (Abnormal) Collected: 03/24/21 1424    Lab Status: Final result Specimen: Blood from Arm, Left Updated: 03/24/21 1500     WBC 9 37 Thousand/uL      RBC 4 02 Million/uL      Hemoglobin 12 3 g/dL      Hematocrit 37 7 %      MCV 94 fL      MCH 30 6 pg      MCHC 32 6 g/dL      RDW 13 9 %      MPV 10 2 fL      Platelets 803 Thousands/uL      nRBC 0 /100 WBCs      Neutrophils Relative 45 %      Immat GRANS % 0 %      Lymphocytes Relative 29 %      Monocytes Relative 8 %      Eosinophils Relative 17 %      Basophils Relative 1 %      Neutrophils Absolute 4 30 Thousands/µL      Immature Grans Absolute 0 02 Thousand/uL      Lymphocytes Absolute 2 73 Thousands/µL      Monocytes Absolute 0 72 Thousand/µL      Eosinophils Absolute 1 55 Thousand/µL      Basophils Absolute 0 05 Thousands/µL                  X-ray chest 1 view portable   ED Interpretation by Olivia Smith MD (03/24 1522)   No acute cardiopulmonary disease      Final Result by Pako Garcia MD (03/24 1525)   No acute cardiopulmonary disease        Findings are stable            Workstation performed: [id number]         CT head w contrast    (Results Pending)         Procedures  Procedures      ED Course  ED Course as of Mar 24 1723   Wed Mar 24, 2021   1540 Procedure Note: EKG  Date/Time: 03/24/21 3:40 PM   Interpreted by: Tawana Saldana  Indications / Diagnosis: CP  ECG reviewed by me, the ED Provider: yes   The EKG demonstrates:  Rate: 68  Rhythm: normal sinus  Intervals: normal intervals  Axis: normal axis  QRS/Blocks: normal QRS  ST Changes: No acute ST Changes, no STD/ONESIMO  SBIRT 22yo+      Most Recent Value   SBIRT (22 yo +)   In order to provide better care to our patients, we are screening all of our patients for alcohol and drug use  Would it be okay to ask you these screening questions? No Filed at: 03/24/2021 3102                MDM  Number of Diagnoses or Management Options  Hypotension:   Syncope:   Diagnosis management comments:  53-year-old male with history of diabetes, hypothyroidism, and dementia presents for possible syncopal episode  Within ddx consider vasovagal, orthostatic, cardiogenic, metabolic etiologies  No focal deficits to suggest acute central process  Will obtain cardiac panel to evaluate  Vitals are currently stable and patient overall well appearing  Final assessment: Workup reassuring but given story and risk factors will admit for further monitoring and evaluation  Discussed with admitting physician who agrees to accept patient for further management  Patient remains stable throughout ED course  Disposition  Final diagnoses:   Syncope   Hypotension     Time reflects when diagnosis was documented in both MDM as applicable and the Disposition within this note     Time User Action Codes Description Comment    3/24/2021  3:56 PM Tawana Saldana Add [R55] Syncope     3/24/2021  3:56 PM Tawana Saldana Add [I95 9] Hypotension       ED Disposition     ED Disposition Condition Date/Time Comment    Admit Stable Wed Mar 24, 2021  3:56 PM Case was discussed with MARLENY and the patient's admission status was agreed to be Admission Status: observation status to the service of [facility]   Follow-up Information    None         Patient's Medications   Discharge Prescriptions    No medications on file     No discharge procedures on file      PDMP Review     None           ED Provider  Attending physically available and evaluated Neida Herbert I managed the patient along with the ED Attending      Electronically Signed by         Apolinar Brandt MD  03/24/21 0870

## 2021-03-24 NOTE — H&P
[address] [facility]  H&P- Radha Goode 1935, 82 y o  male MRN: [id number]  Unit/Bed#: ED 15 Encounter: [id number]  Primary Care Provider: Johann Severin, DO   Date and time admitted to hospital: 3/24/2021  1:55 PM    Dementia without behavioral disturbance (HCC)  Assessment & Plan  Maintain sleep wake cycle, avoid sedatives, frequent reorientation    Benign prostatic hyperplasia with weak urinary stream  Assessment & Plan  Continue flomax    Mixed hyperlipidemia  Assessment & Plan   Continue Lipitor    Type 2 diabetes mellitus without complication, without long-term current use of insulin ([facility] [address] )  Assessment & Plan  Lab Results   Component Value Date    HGBA1C 5 8 (H) 02/11/2021       No results for input(s): POCGLU in the last 72 hours  Blood Sugar Average: Last 72 hrs:   hold oral hypoglycemics   SSI, Accu-Cheks, diabetic diet    Acquired hypothyroidism  Assessment & Plan   Continue levothyroxine    Ambulatory dysfunction  Assessment & Plan   Daughter states that patient has been an assist of 1   PT OT consulted    * Syncope  Assessment & Plan  Likely orthostatic due over treatment with antiHTN meds  orthostats ordered  Hold antiHTN meds  Monitor on telemetry  Outpatient Echo    VTE Prophylaxis: Enoxaparin (Lovenox)  / sequential compression device   Code Status: FULL  POLST: There is no POLST form on file for this patient (pre-hospital)  Discussion with family:  Discussed plan with patient's daughter    Anticipated Length of Stay:  Patient will be admitted on an Observation basis with an anticipated length of stay of   Less than 2 midnights  Justification for Hospital Stay:  Syncope workup    Total Time for Visit, including Counseling / Coordination of Care: 1 hour  Greater than 50% of this total time spent on direct patient counseling and coordination of care      Chief Complaint:    Passed out    History of Present Illness:    Radha Goode is a 82 y o  male who presents after syncopal episode  Patient's daughter provides much of the history as patient is only Serbian speaking and has dementia  She states that he you was added appointment when they patient care aide noticed that he slumped over and lost consciousness for less than a minute  He woke up immediately after and did not have any disorientation  He did not have urine or stool incontinence, though daughter states he has incontinence at baseline  She states that at the scene his blood pressure was 70s over 50s then 80s over 50s then  Upon arriving to the ED his blood pressure was 100s over 60s  She states his blood pressure has started running low after being recently started on a blood pressure medication  She states prior his blood pressure systolic was 877G to 086A over 70s  Otherwise patient has no complaints  Review of Systems:    Review of Systems   Constitutional: Negative for activity change, appetite change, chills, diaphoresis, fatigue and fever  HENT: Negative for congestion, rhinorrhea, sinus pressure, sinus pain and sore throat  Eyes: Negative  Respiratory: Negative for cough, chest tightness and shortness of breath  Cardiovascular: Negative for chest pain, palpitations and leg swelling  Gastrointestinal: Negative for abdominal distention, abdominal pain, constipation, diarrhea, nausea and vomiting  Endocrine: Negative  Genitourinary: Negative for difficulty urinating, dysuria, flank pain, frequency, hematuria and urgency  Musculoskeletal: Negative for back pain, gait problem and neck pain  Skin: Negative  Allergic/Immunologic: Negative  Neurological: Positive for syncope and light-headedness  Negative for dizziness, speech difficulty, weakness and headaches  Hematological: Negative  Psychiatric/Behavioral: Negative  All other systems reviewed and are negative        Past Medical and Surgical History:     Past Medical History:   Diagnosis Date    Allergic Lactose: Shellfish    Asthma     Dementia ([facility] [address] )     Diabetes mellitus ([facility] [address] )     Glaucoma        Past Surgical History:   Procedure Laterality Date    TESTICLE SURGERY         Meds/Allergies:    Prior to Admission medications    Medication Sig Start Date End Date Taking? Authorizing Provider   lisinopril-hydrochlorothiazide (PRINZIDE,ZESTORETIC) 10-12 5 MG per tablet Take 1 tablet by mouth daily   Yes Historical Provider, MD   acetaminophen (TYLENOL) 160 mg/5 mL liquid Take 20 mL (640 mg total) by mouth 2 (two) times a day 2/16/21   Nargis Melara DO   atorvastatin (LIPITOR) 20 mg tablet Take 1 tablet (20 mg total) by mouth daily 2/5/21   Nargis Melara DO   Continuous Blood Gluc Sensor (FreeStyle Shanda 14 Day Sensor) MISC Use 1 Units every 14 (fourteen) days check sugars twice a day  R76 004 2/17/21   Nargis Melara DO   GLIPIZIDE PO Take by mouth    Historical Provider, MD   glucose blood (Contour Next Test) test strip Testing BID  DX: E11 9  Contour Next blood glucose strips 2/1/21   Nargis Melara DO   levothyroxine 112 mcg tablet Take 1 tablet (112 mcg total) by mouth daily 1/12/21   Nargis Melara DO   metFORMIN (Glucophage) 1000 MG tablet Take 1 tablet (1,000 mg total) by mouth daily at bedtime 2/16/21   Nargis Melara DO   Microlet Lancets MISC by Does not apply route 2 (two) times a day 11/9/20   Nargis Melara DO   mupirocin [facility]) 2 % ointment Apply topically 2 (two) times a day 12/21/20   Nargis Melara DO   nystatin (MYCOSTATIN) cream Apply topically 2 (two) times a day as needed (rash) 7/9/20   Nargis Melara DO   tamsulosin [facility]) 0 4 mg Take 1 capsule (0 4 mg total) by mouth daily with dinner 2/17/21   JESSICA Pacheco     I have reviewed home medications with patient family member  Allergies: Allergies   Allergen Reactions    Lactose     Shellfish-Derived Products Diarrhea       Social History:     Marital Status:     Occupation:   Does not work  Patient Pre-hospital Living Situation:  With daughter  Patient Pre-hospital Level of Mobility:  walker  Patient Pre-hospital Diet Restrictions:  diabetic  Substance Use History:   Social History     Substance and Sexual Activity   Alcohol Use Not Currently    Alcohol/week: 0 0 standard drinks     Social History     Tobacco Use   Smoking Status Never Smoker   Smokeless Tobacco Never Used     Social History     Substance and Sexual Activity   Drug Use Never       Family History:    Family History   Problem Relation Age of Onset    No Known Problems Mother     No Known Problems Father     No Known Problems Sister     No Known Problems Brother     No Known Problems Daughter     No Known Problems Sister     No Known Problems Sister     No Known Problems Brother     No Known Problems Brother     No Known Problems Brother     No Known Problems Brother     No Known Problems Daughter     No Known Problems Daughter     Colon cancer Neg Hx        Physical Exam:     Vitals:   Blood Pressure: 134/61 (03/24/21 1730)  Pulse: 60 (03/24/21 1730)  Respirations: 16 (03/24/21 1730)  Weight - Scale: 57 6 kg (127 lb) (03/24/21 1357)  SpO2: 95 % (03/24/21 1730)    Physical Exam  Vitals signs and nursing note reviewed  Constitutional:       Appearance: Normal appearance  He is normal weight  HENT:      Head: Normocephalic and atraumatic  Right Ear: External ear normal       Left Ear: External ear normal       Nose: Nose normal       Mouth/Throat:      Mouth: Mucous membranes are moist       Pharynx: Oropharynx is clear  Eyes:      Conjunctiva/sclera: Conjunctivae normal       Pupils: Pupils are equal, round, and reactive to light  Neck:      Musculoskeletal: Neck supple  No muscular tenderness  Cardiovascular:      Rate and Rhythm: Normal rate and regular rhythm  Pulses: Normal pulses  Heart sounds: Normal heart sounds     Pulmonary:      Effort: Pulmonary effort is normal       Breath sounds: Normal breath sounds  Abdominal:      General: Abdomen is flat  Bowel sounds are normal       Palpations: Abdomen is soft  Musculoskeletal:         General: No swelling or tenderness  Skin:     General: Skin is warm and dry  Capillary Refill: Capillary refill takes less than 2 seconds  Neurological:      General: No focal deficit present  Mental Status: He is alert and oriented to person, place, and time  Mental status is at baseline  Psychiatric:         Mood and Affect: Mood normal          Behavior: Behavior normal          Thought Content: Thought content normal          Judgment: Judgment normal        Additional Data:     Lab Results: I have personally reviewed pertinent reports  Results from last 7 days   Lab Units 03/24/21  1424   WBC Thousand/uL 9 37   HEMOGLOBIN g/dL 12 3   HEMATOCRIT % 37 7   PLATELETS Thousands/uL 259   NEUTROS PCT % 45   LYMPHS PCT % 29   MONOS PCT % 8   EOS PCT % 17*     Results from last 7 days   Lab Units 03/24/21  1424   SODIUM mmol/L 137   POTASSIUM mmol/L 4 2   CHLORIDE mmol/L 103   CO2 mmol/L 29   BUN mg/dL 22   CREATININE mg/dL 1 04   ANION GAP mmol/L 5   CALCIUM mg/dL 9 3   GLUCOSE RANDOM mg/dL 199*                       Imaging: I have personally reviewed pertinent reports  X-ray chest 1 view portable   ED Interpretation by Yobany Jefferson MD (03/24 1522)   No acute cardiopulmonary disease      Final Result by Scott Wooten MD (03/24 1525)   No acute cardiopulmonary disease  Findings are stable            Workstation performed: [id number]         CT head wo contrast    (Results Pending)       EKG, Pathology, and Other Studies Reviewed on Admission:   · EKG:  Normal sinus rhythm    Allscripts / Epic Records Reviewed: Yes     ** Please Note: This note has been constructed using a voice recognition system   **

## 2021-03-24 NOTE — ASSESSMENT & PLAN NOTE
Likely orthostatic due over treatment with antiHTN meds  orthostats ordered  Hold antiHTN meds  Monitor on telemetry  Outpatient Echo

## 2021-03-24 NOTE — ASSESSMENT & PLAN NOTE
Lab Results   Component Value Date    HGBA1C 5 8 (H) 02/11/2021       No results for input(s): POCGLU in the last 72 hours      Blood Sugar Average: Last 72 hrs:   hold oral hypoglycemics   SSI, Accu-Cheks, diabetic diet

## 2021-03-25 LAB
ANION GAP SERPL CALCULATED.3IONS-SCNC: 7 MMOL/L (ref 4–13)
BUN SERPL-MCNC: 18 MG/DL (ref 5–25)
CALCIUM SERPL-MCNC: 8.9 MG/DL (ref 8.3–10.1)
CHLORIDE SERPL-SCNC: 103 MMOL/L (ref 100–108)
CO2 SERPL-SCNC: 25 MMOL/L (ref 21–32)
CREAT SERPL-MCNC: 0.74 MG/DL (ref 0.6–1.3)
ERYTHROCYTE [DISTWIDTH] IN BLOOD BY AUTOMATED COUNT: 13.7 % (ref 11.6–15.1)
GFR SERPL CREATININE-BSD FRML MDRD: 85 ML/MIN/1.73SQ M
GLUCOSE SERPL-MCNC: 117 MG/DL (ref 65–140)
GLUCOSE SERPL-MCNC: 125 MG/DL (ref 65–140)
GLUCOSE SERPL-MCNC: 126 MG/DL (ref 65–140)
GLUCOSE SERPL-MCNC: 158 MG/DL (ref 65–140)
GLUCOSE SERPL-MCNC: 183 MG/DL (ref 65–140)
HCT VFR BLD AUTO: 37.1 % (ref 36.5–49.3)
HGB BLD-MCNC: 12.5 G/DL (ref 12–17)
MCH RBC QN AUTO: 30.9 PG (ref 26.8–34.3)
MCHC RBC AUTO-ENTMCNC: 33.7 G/DL (ref 31.4–37.4)
MCV RBC AUTO: 92 FL (ref 82–98)
PLATELET # BLD AUTO: 254 THOUSANDS/UL (ref 149–390)
PMV BLD AUTO: 9.5 FL (ref 8.9–12.7)
POTASSIUM SERPL-SCNC: 5.3 MMOL/L (ref 3.5–5.3)
RBC # BLD AUTO: 4.04 MILLION/UL (ref 3.88–5.62)
SODIUM SERPL-SCNC: 135 MMOL/L (ref 136–145)
WBC # BLD AUTO: 8.55 THOUSAND/UL (ref 4.31–10.16)

## 2021-03-25 PROCEDURE — 82948 REAGENT STRIP/BLOOD GLUCOSE: CPT

## 2021-03-25 PROCEDURE — 80048 BASIC METABOLIC PNL TOTAL CA: CPT | Performed by: INTERNAL MEDICINE

## 2021-03-25 PROCEDURE — 97167 OT EVAL HIGH COMPLEX 60 MIN: CPT

## 2021-03-25 PROCEDURE — 85027 COMPLETE CBC AUTOMATED: CPT | Performed by: INTERNAL MEDICINE

## 2021-03-25 PROCEDURE — 99232 SBSQ HOSP IP/OBS MODERATE 35: CPT | Performed by: PHYSICIAN ASSISTANT

## 2021-03-25 PROCEDURE — 97163 PT EVAL HIGH COMPLEX 45 MIN: CPT

## 2021-03-25 RX ADMIN — LEVOTHYROXINE SODIUM 112 MCG: 112 TABLET ORAL at 08:16

## 2021-03-25 RX ADMIN — ENOXAPARIN SODIUM 40 MG: 40 INJECTION SUBCUTANEOUS at 08:16

## 2021-03-25 RX ADMIN — INSULIN LISPRO 1 UNITS: 100 INJECTION, SOLUTION INTRAVENOUS; SUBCUTANEOUS at 17:13

## 2021-03-25 RX ADMIN — TAMSULOSIN HYDROCHLORIDE 0.4 MG: 0.4 CAPSULE ORAL at 17:12

## 2021-03-25 RX ADMIN — ATORVASTATIN CALCIUM 20 MG: 20 TABLET, FILM COATED ORAL at 08:16

## 2021-03-25 NOTE — PLAN OF CARE
Problem: Prexisting or High Potential for Compromised Skin Integrity  Goal: Skin integrity is maintained or improved  Description: INTERVENTIONS:  - Identify patients at risk for skin breakdown  - Assess and monitor skin integrity  - Assess and monitor nutrition and hydration status  - Monitor labs   - Assess for incontinence   - Turn and reposition patient  - Assist with mobility/ambulation  - Relieve pressure over bony prominences  - Avoid friction and shearing  - Provide appropriate hygiene as needed including keeping skin clean and dry  - Evaluate need for skin moisturizer/barrier cream  - Collaborate with interdisciplinary team   - Patient/family teaching  - Consider wound care consult   Outcome: Progressing     Problem: Potential for Falls  Goal: Patient will remain free of falls  Description: INTERVENTIONS:  - Assess patient frequently for physical needs  -  Identify cognitive and physical deficits and behaviors that affect risk of falls    -  [place] fall precautions as indicated by assessment   - Educate patient/family on patient safety including physical limitations  - Instruct patient to call for assistance with activity based on assessment  - Modify environment to reduce risk of injury  - Consider OT/PT consult to assist with strengthening/mobility  Outcome: Progressing     Problem: PAIN - ADULT  Goal: Verbalizes/displays adequate comfort level or baseline comfort level  Description: Interventions:  - Encourage patient to monitor pain and request assistance  - Assess pain using appropriate pain scale  - Administer analgesics based on type and severity of pain and evaluate response  - Implement non-pharmacological measures as appropriate and evaluate response  - Consider cultural and social influences on pain and pain management  - Notify physician/advanced practitioner if interventions unsuccessful or patient reports new pain  Outcome: Progressing     Problem: SAFETY ADULT  Goal: Patient will remain free of falls  Description: INTERVENTIONS:  - Assess patient frequently for physical needs  -  Identify cognitive and physical deficits and behaviors that affect risk of falls    -  [place] fall precautions as indicated by assessment   - Educate patient/family on patient safety including physical limitations  - Instruct patient to call for assistance with activity based on assessment  - Modify environment to reduce risk of injury  - Consider OT/PT consult to assist with strengthening/mobility  Outcome: Progressing

## 2021-03-25 NOTE — PLAN OF CARE
Problem: PHYSICAL THERAPY ADULT  Goal: Performs mobility at highest level of function for planned discharge setting  See evaluation for individualized goals  Description: Treatment/Interventions: ADL retraining, Functional transfer training, LE strengthening/ROM, Therapeutic exercise, Endurance training, Cognitive reorientation, Equipment eval/education, Patient/family training, Bed mobility, Gait training, Spoke to nursing, Spoke to case management, OT  Equipment Recommended: (TBD when more info obtained about PLOF)       See flowsheet documentation for full assessment, interventions and recommendations  Note: Prognosis: Fair  Problem List: Decreased strength, Decreased endurance, Impaired balance, Decreased mobility, Decreased coordination, Decreased cognition, Impaired judgement, Decreased safety awareness           PT Discharge Recommendation: Home with skilled therapy, Post-Acute Rehabilitation Services(home vs rehab pending progress and level of assist available)          See flowsheet documentation for full assessment

## 2021-03-25 NOTE — PLAN OF CARE
Problem: Prexisting or High Potential for Compromised Skin Integrity  Goal: Skin integrity is maintained or improved  Description: INTERVENTIONS:  - Identify patients at risk for skin breakdown  - Assess and monitor skin integrity  - Assess and monitor nutrition and hydration status  - Monitor labs   - Assess for incontinence   - Turn and reposition patient  - Assist with mobility/ambulation  - Relieve pressure over bony prominences  - Avoid friction and shearing  - Provide appropriate hygiene as needed including keeping skin clean and dry  - Evaluate need for skin moisturizer/barrier cream  - Collaborate with interdisciplinary team   - Patient/family teaching  - Consider wound care consult   Outcome: Progressing     Problem: Potential for Falls  Goal: Patient will remain free of falls  Description: INTERVENTIONS:  - Assess patient frequently for physical needs  -  Identify cognitive and physical deficits and behaviors that affect risk of falls    -  [place] fall precautions as indicated by assessment   - Educate patient/family on patient safety including physical limitations  - Instruct patient to call for assistance with activity based on assessment  - Modify environment to reduce risk of injury  - Consider OT/PT consult to assist with strengthening/mobility  Outcome: Progressing     Problem: PAIN - ADULT  Goal: Verbalizes/displays adequate comfort level or baseline comfort level  Description: Interventions:  - Encourage patient to monitor pain and request assistance  - Assess pain using appropriate pain scale  - Administer analgesics based on type and severity of pain and evaluate response  - Implement non-pharmacological measures as appropriate and evaluate response  - Consider cultural and social influences on pain and pain management  - Notify physician/advanced practitioner if interventions unsuccessful or patient reports new pain  Outcome: Progressing     Problem: INFECTION - ADULT  Goal: Absence or prevention of progression during hospitalization  Description: INTERVENTIONS:  - Assess and monitor for signs and symptoms of infection  - Monitor lab/diagnostic results  - Monitor all insertion sites, i e  indwelling lines, tubes, and drains  - Monitor endotracheal if appropriate and nasal secretions for changes in amount and color  - [place] appropriate cooling/warming therapies per order  - Administer medications as ordered  - Instruct and encourage patient and family to use good hand hygiene technique  - Identify and instruct in appropriate isolation precautions for identified infection/condition  Outcome: Progressing  Goal: Absence of fever/infection during neutropenic period  Description: INTERVENTIONS:  - Monitor WBC    Outcome: Progressing     Problem: SAFETY ADULT  Goal: Patient will remain free of falls  Description: INTERVENTIONS:  - Assess patient frequently for physical needs  -  Identify cognitive and physical deficits and behaviors that affect risk of falls    -  [place] fall precautions as indicated by assessment   - Educate patient/family on patient safety including physical limitations  - Instruct patient to call for assistance with activity based on assessment  - Modify environment to reduce risk of injury  - Consider OT/PT consult to assist with strengthening/mobility  Outcome: Progressing  Goal: Maintain or return to baseline ADL function  Description: INTERVENTIONS:  -  Assess patient's ability to carry out ADLs; assess patient's baseline for ADL function and identify physical deficits which impact ability to perform ADLs (bathing, care of mouth/teeth, toileting, grooming, dressing, etc )  - Assess/evaluate cause of self-care deficits   - Assess range of motion  - Assess patient's mobility; develop plan if impaired  - Assess patient's need for assistive devices and provide as appropriate  - Encourage maximum independence but intervene and supervise when necessary  - Involve family in performance of ADLs  - Assess for home care needs following discharge   - Consider OT consult to assist with ADL evaluation and planning for discharge  - Provide patient education as appropriate  Outcome: Progressing  Goal: Maintain or return mobility status to optimal level  Description: INTERVENTIONS:  - Assess patient's baseline mobility status (ambulation, transfers, stairs, etc )    - Identify cognitive and physical deficits and behaviors that affect mobility  - Identify mobility aids required to assist with transfers and/or ambulation (gait belt, sit-to-stand, lift, walker, cane, etc )  - [place] fall precautions as indicated by assessment  - Record patient progress and toleration of activity level on Mobility SBAR; progress patient to next Phase/Stage  - Instruct patient to call for assistance with activity based on assessment  - Consider rehabilitation consult to assist with strengthening/weightbearing, etc   Outcome: Progressing

## 2021-03-25 NOTE — ASSESSMENT & PLAN NOTE
· Likely secondary to hypotension due over treatment with antiHTN meds  · Orthostats BPs: negative  · Hold antiHTN meds  Like no need to restart    · Outpatient ECHO

## 2021-03-25 NOTE — OCCUPATIONAL THERAPY NOTE
Occupational Therapy Evaluation     Patient Name: Toma GOMEZ Date: 3/25/2021  Problem List  Principal Problem:    Syncope  Active Problems:    Ambulatory dysfunction    Acquired hypothyroidism    Type 2 diabetes mellitus without complication, without long-term current use of insulin (HCC)    Benign prostatic hyperplasia with weak urinary stream    Dementia without behavioral disturbance [facility])    Past Medical History  Past Medical History:   Diagnosis Date    Allergic     Lactose: Shellfish    Asthma     [facility]     Diabetes mellitus ([facility]     Glaucoma      Past Surgical History  Past Surgical History:   Procedure Laterality Date    TESTICLE SURGERY               03/25/21 0908   OT Last Visit   OT Visit Date 03/25/21   Note Type   Note type Evaluation   Restrictions/Precautions   Weight Bearing Precautions Per Order No   Other Precautions Cognitive; Bed Alarm; Fall Risk  (dementia, Khmer speaking )   Pain Assessment   Pain Assessment Tool 0-10   Pain Score No Pain   Home Living   Type of [address] [place] [address] Two level;Stairs to enter without rails  (7STE)   Home Equipment Walker; Wheelchair-manual   Additional Comments Pt poor historian - information obtained from CM per dtr   Prior Function   Level of [place] Needs assistance with IADLs; Needs assistance with ADLs and functional mobility   Lives With Daughter   Receives Help From Personal care attendant   ADL Assistance Needs assistance   IADLs Needs assistance   Comments [employer] caregivers come in the mornings to assist with bathing and dressing, pt typically able to ambulate short distances  WC for comminity mobility   Pt poor historian - information obtained from CM per dtr   Lifestyle   Autonomy PTA pt requires A for ADLs/IADLs   Reciprocal Relationships dtr, caregivers   Intrinsic Gratification will continue to assess   Psychosocial   Psychosocial (WDL) WDL   ADL   Eating Assistance 5  Supervision/Setup   Grooming Assistance 5  Supervision/Setup   UB Bathing Assistance 4  Minimal Assistance   LB Bathing Assistance 2  Maximal Assistance   UB Dressing Assistance 4  Minimal Assistance   LB Dressing Assistance 2  Maximal [address]  2  Maximal Assistance   Bed Mobility   Supine to Sit 3  Moderate assistance   Additional items Assist x 1; Increased time required;LE management;Verbal cues   Sit to Supine 3  Moderate assistance   Additional items Assist x 1; Increased time required;LE management;Verbal cues   Transfers   Sit to Stand 2  Maximal assistance   Additional items Assist x 2   Stand to Sit 2  Maximal assistance   Additional items Assist x 2   Additional Comments Attempted STS x 2 trials with RW - pt with poor cooperation, unable to achieve full stand -   Balance   Static Sitting Fair   Dynamic Sitting Fair -   Activity Tolerance   Activity Tolerance   (cognition/language)   Medical Staff Made Aware PT Irvin Nunez   Nurse Made Aware RN clearance for session    RUE Assessment   RUE Assessment WFL   LUE Assessment   LUE Assessment WFL   Hand Function   Gross Motor Coordination Functional   Fine Motor Coordination Functional   Vision - Complex Assessment   Ocular Range of Motion WFL   Cognition   Overall Cognitive Status Impaired   Arousal/Participation Alert   Attention DILEEP   Orientation Level Oriented to person   Memory Unable to assess   Following Commands Follows one step commands inconsistently   Comments Pt pleasant t/o session however, Pt unable to follow directions, given in Guyanese  Assessment   Limitation Decreased ADL status; Decreased Safe judgement during ADL;Decreased cognition;Decreased endurance;Decreased self-care trans;Decreased high-level ADLs   Prognosis Fair   Assessment Pt is a 82 y o  male [facility] w/ Syncope  has a past medical history of Allergic, Asthma, Dementia ([facility] [address] [address] Diabetes mellitus ([facility] ), and Glaucoma  Pt with active OT orders   Pt poor hisotrian and limited verbalizations - infromation obtained from CM who spoke with pt's dtr  Pt lives with dtr in [place], [place]  [employer] caregivers come in the mornings to assist pt with dressing and bathing, pt typically able to ambulated [facility] in home with RWASHLEE for community mobility  Upon evaluation, pt currently requires MOD A x 1 sup <> sit  Attempted STS x 2 with MAX A x 2 - unable to achieve full stand, pt very stiff and actively resisting - pt with difficulty understanding and following directions (given in Palauan)  Pt currently  requires MIN A UB ADLs, MAX A LB ADLs, and MAX A toileting  Pt is limited at this time 2*: endurance, activity tolerance, functional mobility, balance, functional standing tolerance, decreased I w/ ADLS/IADLS, ROM, cognitive impairments and decreased safety awareness  The following Occupational Performance Areas to address include: eating, grooming, bathing/shower, toilet hygiene, functional mobility, community mobility and clothing management  Pt to benefit from immediate acute skilled OT to address above deficits, improve overall functional independence, maximize fnxl mobility and reduce caregiver burden  From OT standpoint, recommendation at time of d/c would be STR vs home with skilled therapy - pending progress and level of assist available at home  Pt was left in bed after session with all current needs met  The patient's raw score on the AM-PAC Daily Activity inpatient short form is 15, standardized score is 34 69, less than 39 4  Patients at this level are likely to benefit from discharge to post-acute rehabilitation services  Please refer to the recommendation of the Occupational Therapist for safe discharge planning  Goals   Patient Goals none stated   Plan   Treatment Interventions ADL retraining;Functional transfer training;UE strengthening/ROM; Endurance training;Cognitive reorientation;Patient/family training;Equipment evaluation/education; Compensatory technique education;Continued evaluation; Energy conservation; Activityengagement   Goal Expiration Date 04/08/21   OT Frequency 2-3x/wk   Recommendation   OT Discharge Recommendation Post-Acute Rehabilitation Services  (vs home with skilled therapy )   OT - OK to Discharge Yes  (to rehab when medically stable )   Additional Comments  pending progress and level of assist available at home    AM-PAC Daily Activity Inpatient   Lower Body Dressing 2   Bathing 2   Toileting 2   Upper Body Dressing 3   Grooming 3   Eating 3   Daily Activity Raw Score 15   Daily Activity Standardized Score (Calc for Raw Score >=11) 34 69   AM-PAC Applied Cognition Inpatient   Following a Speech/Presentation 1   Understanding Ordinary Conversation 2   Taking Medications 1   Remembering Where Things Are Placed or Put Away 1   Remembering List of 4-5 Errands 1   Taking Care of Complicated Tasks 1   Applied Cognition Raw Score 7   Applied Cognition Standardized Score 15 17     GOALS    1) Pt will increase activity tolerance to G for 30 min txment sessions    2) Pt will complete UB dressing/self care w/ S using adaptive device and DME as needed    3) Pt will complete LB dressing/self care with MIN A using adaptive device and DME as needed    4) Pt will complete bathing w/ MIN A w/ use of AE and DME as needed    5) Pt will complete toileting w/ S w/ G hygiene/thoroughness using DME as needed    6) Pt will improve functional transfers to S on/off all surfaces using DME as needed w/ G balance/safety     7) Pt will improve functional mobility during ADL/IADL/leisure tasks to S using DME as needed w/ G balance/safety     8) Pt will engage in ongoing cognitive assessment w/ G participation to assist w/ safe d/c planning/recommendations      Mai Johnson MS, OTR/L

## 2021-03-25 NOTE — PLAN OF CARE
Problem: OCCUPATIONAL THERAPY ADULT  Goal: Performs self-care activities at highest level of function for planned discharge setting  See evaluation for individualized goals  Description: Treatment Interventions: ADL retraining, Functional transfer training, UE strengthening/ROM, Endurance training, Cognitive reorientation, Patient/family training, Equipment evaluation/education, Compensatory technique education, Continued evaluation, Energy conservation, Activityengagement          See flowsheet documentation for full assessment, interventions and recommendations  Note: Limitation: Decreased ADL status, Decreased Safe judgement during ADL, Decreased cognition, Decreased endurance, Decreased self-care trans, Decreased high-level ADLs  Prognosis: Fair  Assessment: Pt is a 82 y o  male [facility]"" on 3/24/2021 w/ Syncope  has a past medical history of Allergic, Asthma, Dementia ([facility] [address] ), Diabetes mellitus ([facility] [facility] ), and Glaucoma  Pt with active OT orders  Pt poor hisotrian and limited verbalizations - infromation obtained from CM who spoke with pt's dtr  Pt lives with dtr in [place], [place]  [employer] caregivers come in the mornings to assist pt with dressing and bathing, pt typically able to ambulated [facility] in home with RW, WC for community mobility  Upon evaluation, pt currently requires MOD A x 1 sup <> sit  Attempted STS x 2 with MAX A x 2 - unable to achieve full stand, pt very stiff and actively resisting - pt with difficulty understanding and following directions (given in Montenegrin)  Pt currently  requires MIN A UB ADLs, MAX A LB ADLs, and MAX A toileting  Pt is limited at this time 2*: endurance, activity tolerance, functional mobility, balance, functional standing tolerance, decreased I w/ ADLS/IADLS, ROM, cognitive impairments and decreased safety awareness  The following Occupational Performance Areas to address include: eating, grooming, bathing/shower, toilet hygiene, functional mobility, community mobility and clothing management  Pt to benefit from immediate acute skilled OT to address above deficits, improve overall functional independence, maximize fnxl mobility and reduce caregiver burden  From OT standpoint, recommendation at time of d/c would be STR vs home with skilled therapy - pending progress and level of assist available at home  Pt was left in bed after session with all current needs met  The patient's raw score on the AM-PAC Daily Activity inpatient short form is 15, standardized score is 34 69, less than 39 4  Patients at this level are likely to benefit from discharge to post-acute rehabilitation services  Please refer to the recommendation of the Occupational Therapist for safe discharge planning        OT Discharge Recommendation: Post-Acute Rehabilitation Services(vs home with skilled therapy )  OT - OK to Discharge: Yes(to rehab when medically stable )

## 2021-03-25 NOTE — ASSESSMENT & PLAN NOTE
Lab Results   Component Value Date    HGBA1C 5 8 (H) 02/11/2021     Recent Labs     03/25/21  0627   POCGLU 126     Blood Sugar Average: Last 72 hrs:  (P) 126   · Well controlled as evidenced by A1c  · Hold oral hypoglycemics (metformin, glipizide) during hospitalization  Resume at discharge    · Continue accu-checks, SSI and diabetic diet

## 2021-03-25 NOTE — UTILIZATION REVIEW
Initial Clinical Review    WAS OBSERVATION 03/24/2021 @ 2839, CONVERTED TO INPATIENT ADMISSION 03/25/2021 @ 1622, DUE TO CONTINUED STAY REQUIRED TO CARE FOR PATIENT WITH   PT recommends rehab - waiting for family decision  Admission: Date/Time/Statement:   03/25/21 1623  Inpatient Admission Once     Question Answer Comment   Level of Care Med Surg    Estimated length of stay More than 2 Midnights    Certification I certify that inpatient services are medically necessary for this patient for a duration of greater than two midnights  See H&P and MD Progress Notes for additional information about the patient's course of treatment  ED Arrival Information     Expected Arrival Acuity Means of Arrival Escorted By Service Admission Type    - 3/24/2021 13:55 Urgent Ambulance [facility] Urgent    Arrival Complaint    syncope        Chief Complaint   Patient presents with    Syncope     comes to ed from [facility] after a syncopal episode observed by staff  pt has dementia  hypotensive for EMS  recently started taking lisinopril      Assessment/Plan: 82year old male, presented to the ED @ [facility] from [facility] via EMS  Admitted as Observation due to Syncope  [facility] when staff noticed him slumped over in his wheelchair  On EMS arrival he was hypotensive to SBP in high 80s and diaphoretic  He returned to baseline en route  Orthostatics  Hold antiHTN medication  Monitor on telemetry  VTE Prophylaxis: Enoxaparin (Lovenox)  / sequential compression device     03/25/2021  Progress Note:   Orthostatic BP - negative  Hold antiHTN meds - no need to restart  Monitor BP  PT/OT - PT recommend rehab - family not sure what they want, awaiting decision        ED Triage Vitals   Temperature Pulse Respirations Blood Pressure SpO2   03/24/21 2300 03/24/21 1357 03/24/21 1357 03/24/21 1357 03/24/21 1357   97 6 °F (36 4 °C) 68 16 124/58 98 %      Temp src Heart Rate Source Patient Position - Orthostatic VS BP Location FiO2 (%)   -- 21 1357 21 1430 21 1430 --    Monitor Lying Right arm       Pain Score       --                 Wt Readings from Last 1 Encounters:   21 57 6 kg (127 lb)     Additional Vital Signs:   Date/Time  Temp  Pulse  Resp  BP  MAP (mmHg)  SpO2  O2 Device  Patient Position - Orthostatic VS   21 12:43:07  --  65  --  144/64  91  96 %  --  --   21 10:59:56  --  57  16  131/63  86  97 %  --  --   21 07:39:02  --  58  --  140/60  87  96 %  --  --   21 01:51:10  --  77  --  141/74  96  96 %  --  --   21 01:47:12  --  71  --  130/73  92  96 %  --  --   21 0146  --  65  --  130/73  92  97 %  --  --   21 01:32:31  --  56  --  121/53  76  95 %  --  --   21 23:00:41  97 6 °F (36 4 °C)  67  --  137/71  93  95 %  None (Room air)  --   21 19:38:03  --  --  --  144/88  107  --  --  --   21 1730  --  60  16  134/61  88  95 %  None (Room air)  Lying   21 1430  --  62  14  113/56  80  100 %  None (Room air)  Lying     Date and Time Eye Opening Best Verbal Response Best Motor Response [place] Coma Scale Score   21 2300 4 5 6 15   21 1408 4 4 6 14     2021 @ 0707  CT head:  No acute intracranial abnormality   There is mild to moderate microangiopathic change, progressed somewhat compared with 2019   An old lacune in the left thalamus appears unchanged   There is generalized atrophy  Paranasal sinus disease, as described above, worse compared with 2019   Clinical correlation regarding the possibility of acute on chronic sinusitis is recommended  2021 @ 1524  Chest X:  No acute cardiopulmonary disease       2021 @ 1408  EC, NSR, ST elevation, consider early repolarization, pericarditis, or injury    2021 @ 1630  EC, Sinus rhythm with 1st degree A-V block    Pertinent Labs/Diagnostic Test Results:     Results from last 7 days Lab Units 03/25/21  0829 03/24/21  1424   WBC Thousand/uL 8 55 9 37   HEMOGLOBIN g/dL 12 5 12 3   HEMATOCRIT % 37 1 37 7   PLATELETS Thousands/uL 254 259   NEUTROS ABS Thousands/µL  --  4 30     Results from last 7 days   Lab Units 03/25/21  0543 03/24/21  1424   SODIUM mmol/L 135* 137   POTASSIUM mmol/L 5 3 4 2   CHLORIDE mmol/L 103 103   CO2 mmol/L 25 29   ANION GAP mmol/L 7 5   BUN mg/dL 18 22   CREATININE mg/dL 0 74 1 04   EGFR ml/min/1 73sq m 85 66   CALCIUM mg/dL 8 9 9 3     Results from last 7 days   Lab Units 03/25/21  0627   POC GLUCOSE mg/dl 126     Results from last 7 days   Lab Units 03/25/21  0543 03/24/21  1424   GLUCOSE RANDOM mg/dL 117 199*     Results from last 7 days   Lab Units 03/24/21  1424   TROPONIN I ng/mL <0 02     ED Treatment:   Medication Administration from 03/24/2021 1355 to 03/24/2021 1925     None        Past Medical History:   Diagnosis Date    Allergic     Lactose: Shellfish    Asthma     [facility] [address] [facility]     Diabetes mellitus ([facility]     Glaucoma      Present on Admission:   Ambulatory dysfunction   Acquired hypothyroidism   Type 2 diabetes mellitus without complication, without long-term current use of insulin ([facility])   Mixed hyperlipidemia   Benign prostatic hyperplasia with weak urinary stream   Dementia without behavioral disturbance ([facility])      Admitting Diagnosis: Syncope [R55]  Hypotension [I95 9]  Age/Sex: 82 y o  male  Admission Orders:  Scheduled Medications:  atorvastatin, 20 mg, Oral, Daily  enoxaparin, 40 mg, Subcutaneous, Daily  insulin lispro, 1-5 Units, Subcutaneous, TID AC  levothyroxine, 112 mcg, Oral, Daily  tamsulosin, 0 4 mg, Oral, Daily With Dinner      Continuous IV Infusions:     PRN Meds:  acetaminophen, 650 mg, Oral, Q6H PRN  ondansetron, 4 mg, Intravenous, Q6H PRN  sodium chloride (PF), 3 mL, Intravenous, Q1H PRN      Telemetry  Consult PT/OT  [place] SCDs      Network Utilization Review Department  ATTENTION: Please call with any questions or concerns to [phone] and carefully listen to the prompts so that you are directed to the right person  All voicemails are confidential   [place] all requests for admission clinical reviews, approved or denied determinations and any other requests to dedicated fax number below belonging to the campus where the patient is receiving treatment   List of dedicated fax numbers for the Facilities:  [address] DENIALS (Administrative/Medical Necessity) [phone]   [address] (Maternity/NICU/Pediatrics) [phone]   [address] Keeley Duran 4405 (  Graciela Alfaro "[place]" 103) [address] [place] [address] Steven Enriquez [address] [place] [address] [phone]

## 2021-03-25 NOTE — CASE MANAGEMENT
Pt is not a <30 day readmission or a bundle  Risk of unplanned readmission score is unavailable at this time  Pt admitted due to syncope  Pt documented as alert and oriented x 1  CM called and spoke with pt's daughter, Juana Bae ([phone]) to introduce CM role and begin discharge planning  Pt lives with his daughter Susie Robles in a  in a 2 story house that has 7 ONESIMO in the front without railing  Pt requires assistance with ADLs PTA, pt's daughter assists and pt recently signed on with [employer] who provide caregivers in the mornings  Pt has a rolling walker that he uses for short distances, a wheelchair for longer distances, and has a shower chair and bedside commode  Pt has a reported history of VNA however pt's daughter unsure of agency  History of STR at [facility]  Pt has no reported history of inpatient MH treatment or D/A treatment  CM received call from Sangeeta Whitley with [employer] (P: [phone]) who informed that she is the  following pt's case  CM requested she fax pt's [employer] card to [employer] [facility] as it is not on file, pt's chart still indicates that pt has [facility]  CM left voicemail for financial department to inform of same  CM spoke with pt's daughter regarding STR recommendation, pt's daughter in agreement  She requested a referral to [facility] as pt has been there in the past and they contract with [employer]  Referral placed via [email or web address], [employer] card copy attached in referral     CM reviewed d/c planning process including the following: identifying help at home, patient preference for d/c planning needs, Discharge Lounge, [facility] Meds to Bed program, availability of treatment team to discuss questions or concerns patient and/or family may have regarding understanding medications and recognizing signs and symptoms once discharged  CM also encouraged patient to follow up with all recommended appointments after discharge   Patient advised of importance for patient and family to participate in managing patients medical well being

## 2021-03-25 NOTE — PHYSICAL THERAPY NOTE
Physical Therapy Evaluation    Patient's Name: Carley Bowen    Admitting Diagnosis  Syncope [R55]  Hypotension [I95 9]    Problem List  Patient Active Problem List   Diagnosis    Ambulatory dysfunction    Acquired hypothyroidism    History of CVA (cerebrovascular accident)    Type 2 diabetes mellitus without complication, without long-term current use of insulin ([facility] )    Mixed hyperlipidemia    Benign prostatic hyperplasia with weak urinary stream    Mild intermittent asthma without complication    Allergic reaction    Diverticulosis    Dementia without behavioral disturbance ([facility] )    Urinary incontinence    Fecal incontinence    Encounter for medication review    Syncope       Past Medical History  Past Medical History:   Diagnosis Date    Allergic     Lactose: Shellfish    Asthma     Dementia ([facility] )     Diabetes mellitus ([facility] )     Glaucoma        Past Surgical History  Past Surgical History:   Procedure Laterality Date    TESTICLE SURGERY         Recent Imaging  CT head wo contrast   Final Result by Rachelle Lane MD (03/25 0006)      No acute intracranial abnormality  There is mild to moderate microangiopathic change, progressed somewhat compared with 2019  An old lacune in the left thalamus appears unchanged  There is generalized atrophy  Paranasal sinus disease, as described above, worse compared with December 11, 2019  Clinical correlation regarding the possibility of acute on chronic sinusitis is recommended  Workstation performed: [id number]         X-ray chest 1 view portable   ED Interpretation by Lázaro Taylor MD (03/24 1522)   No acute cardiopulmonary disease      Final Result by Edilia Nair MD (03/24 1525)   No acute cardiopulmonary disease        Findings are stable            Workstation performed: [id number]             Recent Vital Signs  Vitals:    03/25/21 0151 03/25/21 0739 03/25/21 1059 03/25/21 1243   BP: 141/74 140/60 131/63 144/64   Pulse: 77 58 57 65   Resp:   16    Temp:       SpO2: 96% 96% 97% 96%   Weight:              03/25/21 0855   PT Last Visit   PT Visit Date 03/25/21   Note Type   Note type Evaluation   Pain Assessment   Pain Assessment Tool Pain Assessment not indicated - pt denies pain   Home Living   Type of [address] [place] [address] Two level;Stairs to enter with rails;Bed/bath upstairs  (7 ONESIMO with HR; FF to 2nd floor)   Bathroom Accessibility Accessible via walker   [address] [facility],[address]   Additional Comments ambulates short distances with RW at baseline   Prior Function   Level of [place] Needs assistance with IADLs; Needs assistance with ADLs and functional mobility   Lives With Daughter  (senior life aids)   Receives Help From Personal care attendant   ADL Assistance Needs assistance   IADLs Needs assistance   Comments pt lives with DTR who provides assist along with senior life HHA; she reports he is able to ambulate with RW at home with Ax1   Restrictions/Precautions   Weight Bearing Precautions Per Order No   Other Precautions Cognitive; Chair Alarm; Bed Alarm; Fall Risk  (Maltese speaking with dementia)   General   Family/Caregiver Present No   Cognition   Overall Cognitive Status Impaired   Arousal/Participation Cooperative   Orientation Level Oriented to person   Memory Unable to assess   Following Commands Follows one step commands inconsistently   Comments pt is not following commands in Maltese   RLE Assessment   RLE Assessment WFL   LLE Assessment   LLE Assessment WFL   Coordination   Movements are Fluid and Coordinated 0   Coordination and Movement Description very stiff resisting attempts at mobilization   Bed Mobility   Supine to Sit 3  Moderate assistance   Additional items Assist x 1; Increased time required;Verbal cues;LE management   Sit to Supine 3  Moderate assistance   Additional items Assist x 1; Increased time required;Verbal cues;LE management   Transfers   Additional Comments attempted sit to stand transfers with RW however poor cooperation from pt not following commands and very stiff/resisting attempts to assist with mobilization; able to sit EOB    Balance   Static Sitting Fair   Dynamic Sitting Fair -   Activity Tolerance   Activity Tolerance   (limited by poor cog/command following)   Medical Staff Made Aware spoke to OT   Nurse Made Aware spoke to RN   Assessment   Prognosis Fair   Problem List Decreased strength;Decreased endurance; Impaired balance;Decreased mobility; Decreased coordination;Decreased cognition; Impaired judgement;Decreased safety awareness   Goals   Patient Goals pt not stating goals   STG Expiration Date 04/04/21   Short Term Goal #1 Pt will complete bed mobility min A x1, pt will complete transfers with RW min A x1, pt will ambulate 50ft with RW and min Ax1, will negotiate 7 steps with HR and min A to return home  Plan   Treatment/Interventions ADL retraining;Functional transfer training;LE strengthening/ROM; Therapeutic exercise; Endurance training;Cognitive reorientation;Equipment eval/education;Patient/family training;Bed mobility;Gait training;Spoke to nursing;Spoke to case management;OT   PT Frequency   (3-5x/wk)   Recommendation   PT Discharge Recommendation Home with skilled therapy;Post-Acute Rehabilitation Services  (home vs rehab pending progress and level of assist available)   Equipment Recommended   (TBD when more info obtained about PLOF)   AM-PAC Basic Mobility Inpatient   Turning in Bed Without Bedrails 3   Lying on Back to Sitting on Edge of Flat Bed 2   Moving Bed to Chair 2   Standing Up From Chair 2   Walk in Room 1   Climb 3-5 Stairs 1   Basic Mobility Inpatient Raw Score 11   Basic Mobility Standardized Score 30 25         ASSESSMENT                                                                                                                     Liam Rivas is a 82 y o  male admitted to [facility] on 3/24/2021 for Syncope   Pt  has a past medical history of Allergic, Asthma, Dementia ([facility] ), Diabetes mellitus ([facility] ), and Glaucoma    PT was consulted and pt was seen on 3/25/2021 for mobility assessment and d/c planning  Pt presents supine in bed confused and oriented to self only, struggles with following commands likely due to dementia as pt was spoken to in [address]  with limited success  Pt is not very cooperative with assessment of mobility not following commands in Slovenian and resisting most attempts at assisting him with functional mobility likely 2* to dementia and unfamiliar environment as he was reporting no pain and did not appear to be in pain  Per CM who spoke with DTR pt is able to ambulate short distance at home with Ax1 using RW  Pt may perform better with dtr present in room to provide directions and improve pt cooperation  If able to have pt cooperate better may demonstrate mobility at level well enough to go home  Pt is currently functioning at a moderate assistance x1 level for bed mobility unable to complete transfers or ambulation at this time due to reasons mentioned above  The patient's AM-PAC Basic Mobility Inpatient Short Form Raw Score is 11, Standardized Score is 30 25  A standardized score less than 42 9 suggests the patient may benefit from discharge to post-acute rehabilitation services  Please also refer to the recommendation of the Physical Therapist for safe discharge planning  Recommendations                                                                                                              Pt will benefit from continued skilled IP PT to address the above mentioned impairments in order to maximize recovery and increase functional independence when completing mobility and ADLs  See flow sheet for goals and POC       DME:  rolling walker    Discharge Disposition:  Home with Home Physical Therapy and Post Acute Rehab Services      Marysol Koch PT, DPT

## 2021-03-25 NOTE — PROGRESS NOTES
[address]  Progress Note - Jalen Clamp 1935, 82 y o  male MRN: [id number]  Unit/Bed#: CW2 211-01 Encounter: [id number]  Primary Care Provider: Kira Gonzalez DO   Date and time admitted to hospital: 3/24/2021  1:55 PM    * Syncope  Assessment & Plan  · Likely secondary to hypotension due over treatment with antiHTN meds  · Orthostats BPs: negative  · Hold antiHTN meds  Like no need to restart  · Outpatient ECHO    Dementia without behavioral disturbance ([facility] )  Assessment & Plan  · Maintain sleep wake cycle, avoid sedatives, frequent reorientation    Type 2 diabetes mellitus without complication, without long-term current use of insulin [facility])  Assessment & Plan  Lab Results   Component Value Date    HGBA1C 5 8 (H) 02/11/2021     Recent Labs     03/25/21  0627   POCGLU 126     Blood Sugar Average: Last 72 hrs:  (P) 126   · Well controlled as evidenced by A1c  · Hold oral hypoglycemics (metformin, glipizide) during hospitalization  Resume at discharge  · Continue accu-checks, SSI and diabetic diet    Acquired hypothyroidism  Assessment & Plan  · Continue levothyroxine    Ambulatory dysfunction  Assessment & Plan  · Daughter states that patient has been an assist of 1  · PT/OT evaluations: pending      VTE Pharmacologic Prophylaxis:   Pharmacologic: Enoxaparin (Lovenox)  Mechanical: Mechanical VTE prophylaxis in place  Patient Centered Rounds: I have performed bedside rounds with nursing staff today  Discussions with Specialists or Other Care Team Provider: Case management  Education and Discussions with Family / Patient: Called and spoke with patient's daughter twice to determine what discharge plan would be  She would like to discuss with her sister  Time Spent for Care: 30 minutes  More than 50% of total time spent on counseling and coordination of care as described above      Current Length of Stay: 0 day(s)  Current Patient Status: Observation Certification Statement: The patient, admitted on an observation basis, will now require > 2 midnight hospital stay due to safe discharge plan  PT recommending rehab but family unsure of whether they want to take him home or let him go to rehab  Discharge Plan: Patient is medically stable for discharge; however, PT recommends rehab and family not sure they want that  Awaiting their decision  Code Status: Level 1 - Full Code    Subjective:   Patient is Malay-speaking only and has dementia   was difficult for the patient to use so no useful information could be obtained  Per nursing, patient has been pleasant and cooperative and appears to have no issues  Objective:   Vitals:   Temp (24hrs), Av 2 °F (36 8 °C), Min:97 6 °F (36 4 °C), Max:98 7 °F (37 1 °C)    Temp:  [97 6 °F (36 4 °C)-98 7 °F (37 1 °C)] 98 7 °F (37 1 °C)  HR:  [56-77] 57  Resp:  [16-18] 18  BP: (121-144)/(53-88) 135/66  SpO2:  [95 %-97 %] 96 %  Body mass index is 28 47 kg/m²  Input and Output Summary (last 24 hours):     No intake or output data in the 24 hours ending 21 1554    Physical Exam:     Physical Exam  Vitals signs reviewed  HENT:      Head: Normocephalic and atraumatic  Eyes:      General: No scleral icterus  Cardiovascular:      Rate and Rhythm: Normal rate and regular rhythm  Heart sounds: No murmur  Pulmonary:      Breath sounds: Normal breath sounds  No wheezing or rales  Chest:      Chest wall: No tenderness  Abdominal:      General: Bowel sounds are normal  There is no distension  Palpations: Abdomen is soft  Tenderness: There is no abdominal tenderness  Musculoskeletal: Normal range of motion  Skin:     General: Skin is warm and dry  Findings: No rash         Additional Data:   Labs:  Results from last 7 days   Lab Units 21  0829 21  1424   WBC Thousand/uL 8 55 9 37   HEMOGLOBIN g/dL 12 5 12 3   HEMATOCRIT % 37 1 37 7   PLATELETS Thousands/uL 254 259 NEUTROS PCT %  --  45   LYMPHS PCT %  --  29   MONOS PCT %  --  8   EOS PCT %  --  17*     Results from last 7 days   Lab Units 03/25/21  0543   POTASSIUM mmol/L 5 3   CHLORIDE mmol/L 103   CO2 mmol/L 25   BUN mg/dL 18   CREATININE mg/dL 0 74   CALCIUM mg/dL 8 9           * I Have Reviewed All Lab Data Listed Above  * Additional Pertinent Lab Tests Reviewed: All Labs Within Last 24 Hours Reviewed    Imaging:    Imaging Reports Reviewed Today Include: None new    Cultures:   Blood Culture:   Lab Results   Component Value Date    BLOODCX No Growth After 5 Days  12/12/2019    BLOODCX No Growth After 5 Days  12/12/2019     Urine Culture:   Lab Results   Component Value Date    URINECX >100,000 cfu/ml Escherichia coli (A) 12/12/2019     Sputum Culture: No components found for: SPUTUMCX  Wound Culture: No results found for: WOUNDCULT    Last 24 Hours Medication List:   Current Facility-Administered Medications   Medication Dose Route Frequency Provider Last Rate    acetaminophen  650 mg Oral Q6H PRN Matthew Recinos MD      atorvastatin  20 mg Oral Daily Matthew Recinos MD      enoxaparin  40 mg Subcutaneous Daily Matthew Recinos MD      insulin lispro  1-5 Units Subcutaneous TID [facility] Matthew Recinos MD      levothyroxine  112 mcg Oral Daily Matthew Recinos MD      ondansetron  4 mg Intravenous Q6H PRN Matthew Recinos MD      sodium chloride (PF)  3 mL Intravenous Q1H PRN Matthew Recinos MD      tamsulosin  0 4 mg Oral Daily With Amber Arita MD          Today, Patient Was Seen By: Hyacinth Coto PA-C    ** Please Note: Dragon 360 Dictation voice to text software may have been used in the creation of this document   ** DISPLAY PLAN FREE TEXT

## 2021-03-26 LAB
FLUAV RNA RESP QL NAA+PROBE: NEGATIVE
FLUBV RNA RESP QL NAA+PROBE: NEGATIVE
GLUCOSE SERPL-MCNC: 114 MG/DL (ref 65–140)
GLUCOSE SERPL-MCNC: 128 MG/DL (ref 65–140)
GLUCOSE SERPL-MCNC: 206 MG/DL (ref 65–140)
GLUCOSE SERPL-MCNC: 267 MG/DL (ref 65–140)
RSV RNA RESP QL NAA+PROBE: NEGATIVE
SARS-COV-2 RNA RESP QL NAA+PROBE: NEGATIVE

## 2021-03-26 PROCEDURE — 99232 SBSQ HOSP IP/OBS MODERATE 35: CPT | Performed by: GENERAL PRACTICE

## 2021-03-26 PROCEDURE — 82948 REAGENT STRIP/BLOOD GLUCOSE: CPT

## 2021-03-26 PROCEDURE — 0241U HB NFCT DS VIR RESP RNA 4 TRGT: CPT | Performed by: GENERAL PRACTICE

## 2021-03-26 RX ADMIN — LEVOTHYROXINE SODIUM 112 MCG: 112 TABLET ORAL at 10:19

## 2021-03-26 RX ADMIN — INSULIN LISPRO 2 UNITS: 100 INJECTION, SOLUTION INTRAVENOUS; SUBCUTANEOUS at 17:58

## 2021-03-26 RX ADMIN — TAMSULOSIN HYDROCHLORIDE 0.4 MG: 0.4 CAPSULE ORAL at 17:58

## 2021-03-26 RX ADMIN — ENOXAPARIN SODIUM 40 MG: 40 INJECTION SUBCUTANEOUS at 10:21

## 2021-03-26 RX ADMIN — ATORVASTATIN CALCIUM 20 MG: 20 TABLET, FILM COATED ORAL at 10:19

## 2021-03-26 NOTE — NURSING NOTE
Notified by Catalina Cali of [facility] that patient does not need nighttime insulin coverage  Patient's blood sugar is well-controlled, under 200      M Jerry 03/25/2021

## 2021-03-26 NOTE — CASE MANAGEMENT
CM spoke with [facility] liaison who informed that they are able to accept pt at [facility] pending negative covid results  CM requested Dr Guevara Degree place order for Covid test     CM spoke with pt's daughter who continues to request STR, she does not wish for pt to return home at discharge and be evaluated by [facility] for additional in home resources  CM called Branda Dancer from [employer] ([phone]) and left a voicemail updating on discharge plan and requested a return call  Branda Dancer from [employer] called and confirmed that she is in agreement with STR at [facility]  She requested that CM contact her with a transportation time  CM department to follow

## 2021-03-26 NOTE — PROGRESS NOTES
[facility]  Progress Note - Keya Hippo 1935, 82 y o  male MRN: [id number]  Unit/Bed#: CW2 211-01 Encounter: [id number]  Primary Care Provider: Damaris Hampton DO   Date and time admitted to hospital: 3/24/2021  1:55 PM    * Syncope  Assessment & Plan  · Likely secondary to hypotension due over treatment with antiHTN meds  · Orthostats BPs: negative  · Hold antiHTN meds  no need to restart  · Outpatient ECHO    Dementia without behavioral disturbance ([facility] )  Assessment & Plan  · Maintain sleep wake cycle, avoid sedatives, frequent reorientation    Benign prostatic hyperplasia with weak urinary stream  Assessment & Plan  Continue flomax    Type 2 diabetes mellitus without complication, without long-term current use of insulin [facility])  Assessment & Plan  Lab Results   Component Value Date    HGBA1C 5 8 (H) 02/11/2021     Recent Labs     03/25/21  2113 03/26/21  0635 03/26/21  1023 03/26/21  1632   POCGLU 183* 128 114 267*     Blood Sugar Average: Last 72 hrs:  (P) 157 [id number]   · Well controlled as evidenced by A1c  · Hold oral hypoglycemics (metformin, glipizide) during hospitalization  Resume at discharge  · Continue accu-checks, SSI and diabetic diet    Acquired hypothyroidism  Assessment & Plan  · Continue levothyroxine    Ambulatory dysfunction  Assessment & Plan  · Daughter states that patient has been an assist of 1  · PT/OT evaluations: rehab vs home w/ services  Dr Spicer Brothers of [facility] called me today and said that [facility] was able to provide the services needed for pt  However, dtr prefers pt be placed in rehab  Will d/c to rehab tomorrow    VTE Pharmacologic Prophylaxis:   Pharmacologic: Enoxaparin (Lovenox)  Mechanical VTE Prophylaxis in Place: Yes    Patient Centered Rounds: I have performed bedside rounds with nursing staff today      Discussions with Specialists or Other Care Team Provider: no    Education and Discussions with Family / Patient: Dtr    Time Spent for Care: 30 minutes  More than 50% of total time spent on counseling and coordination of care as described above  Current Length of Stay: 1 day(s)    Current Patient Status: Inpatient   Certification Statement: The patient will continue to require additional inpatient hospital stay due to need for rehab placement    [facility] Plan[facility] AM    Code Status: Level 1 - Full Code      Subjective:   No acute complaints    Objective:     Vitals:   Temp (24hrs), Av 3 °F (36 8 °C), Min:97 3 °F (36 3 °C), Max:98 9 °F (37 2 °C)    Temp:  [97 3 °F (36 3 °C)-98 9 °F (37 2 °C)] 98 9 °F (37 2 °C)  HR:  [57-62] 57  Resp:  [16-19] 19  BP: (118-146)/(65-86) 118/78  SpO2:  [96 %-100 %] 96 %  Body mass index is 28 47 kg/m²  Input and Output Summary (last 24 hours): Intake/Output Summary (Last 24 hours) at 3/26/2021 192  Last data filed at 3/26/2021 0224  Gross per 24 hour   Intake --   Output 250 ml   Net -250 ml       Physical Exam:     Physical Exam  HENT:      Head: Normocephalic and atraumatic  Nose: Nose normal    Eyes:      Extraocular Movements: Extraocular movements intact  Conjunctiva/sclera: Conjunctivae normal    Neck:      Musculoskeletal: Normal range of motion and neck supple  Cardiovascular:      Rate and Rhythm: Normal rate and regular rhythm  Pulmonary:      Effort: Pulmonary effort is normal       Breath sounds: Normal breath sounds  No wheezing or rales  Abdominal:      General: Bowel sounds are normal  There is no distension  Palpations: Abdomen is soft  Tenderness: There is no abdominal tenderness  Musculoskeletal: Normal range of motion  Right lower leg: No edema  Left lower leg: No edema  Skin:     General: Skin is warm and dry  Neurological:      Mental Status: He is alert  He is disoriented           Additional Data:     Labs:    Results from last 7 days   Lab Units 21  0829 21  1424   WBC Thousand/uL 8  55 9 37   HEMOGLOBIN g/dL 12 5 12 3   HEMATOCRIT % 37 1 37 7   PLATELETS Thousands/uL 254 259   NEUTROS PCT %  --  45   LYMPHS PCT %  --  29   MONOS PCT %  --  8   EOS PCT %  --  17*     Results from last 7 days   Lab Units 03/25/21  0543   POTASSIUM mmol/L 5 3   CHLORIDE mmol/L 103   CO2 mmol/L 25   BUN mg/dL 18   CREATININE mg/dL 0 74   CALCIUM mg/dL 8 9           * I Have Reviewed All Lab Data Listed Above  * Additional Pertinent Lab Tests Reviewed: [facility] [address] Admission Reviewed        Recent Cultures (last 7 days):           Last 24 Hours Medication List:   Current Facility-Administered Medications   Medication Dose Route Frequency Provider Last Rate    acetaminophen  650 mg Oral Q6H PRN Kiara Benjamin MD      atorvastatin  20 mg Oral Daily Kiara Benjamin MD      enoxaparin  40 mg Subcutaneous Daily Kiara Benjamin MD      insulin lispro  1-5 Units Subcutaneous TID Ronak Marmolejo MD      levothyroxine  112 mcg Oral Daily Kiara Benjamin MD      ondansetron  4 mg Intravenous Q6H PRN Kiara Benjamin MD      sodium chloride (PF)  3 mL Intravenous Q1H PRN Kiara Benjamin MD      tamsulosin  0 4 mg Oral Daily With Susanna Norwood MD          Today, Patient Was Seen By: Angelique Mcgregor DO    ** Please Note: Dictation voice to text software may have been used in the creation of this document   **

## 2021-03-26 NOTE — CASE MANAGEMENT
CM confirmed [facility] transport for tomorrow at 11:30am with dispatcher Pranay Lira from [place] [address]  Cm TC to pt's family and informed them of the cost of transport prior to arranging, family is agreeable to the cost     RN, Buster Merino and facility all made aware of the same

## 2021-03-26 NOTE — ASSESSMENT & PLAN NOTE
· Likely secondary to hypotension due over treatment with antiHTN meds  · Orthostats BPs: negative  · Hold antiHTN meds  no need to restart    · Outpatient ECHO

## 2021-03-26 NOTE — UTILIZATION REVIEW
Notification of Inpatient Admission/Inpatient Authorization Request   This is a Notification of Inpatient Admission for [address]  Be advised that this patient was admitted to our facility under Inpatient Status  Contact Lashawn Santos at [phone] for additional admission information  Aldair Reis  DEPT  DEDICATED [phone]  Patient Name:   Jose Pormariela   YOB: 1935       [address]   [address]:   [address]  Tax ID: [id number]  NPI: [id number] Attending Provider/NPI:  Phone:  Address: Charlee Salazar [[id number]]  [phone]  Same as Facility   Place of Service Code: 24 Place of Service Name:  [address] [place] [address]   Start Date: 3/25/21 1622 Discharge Date & Time: No discharge date for patient encounter  Type of Admission: Inpatient Status Discharge Disposition (if discharged): Home/Self Care   Patient Diagnoses: Syncope [R55]  Hypotension [I95 9]     Orders: Admission Orders (From admission, onward)     Ordered        03/25/21 1622  Inpatient Admission  Once         03/24/21 1549  Place in Observation  Once                    Assigned Utilization Review Contact: Lashawn Santos  Utilization   Network Utilization Review Department  Phone: [phone]; Fax [phone]  Email: [email or web address]   ATTENTION PAYERS: Please call the assigned Utilization  directly with any questions or concerns ALL voicemails in the department are confidential  Send all requests for admission clinical reviews, approved or denied determinations and any other requests to dedicated fax number belonging to the campus where the patient is receiving treatment

## 2021-03-26 NOTE — ASSESSMENT & PLAN NOTE
· Daughter states that patient has been an assist of 1  · PT/OT evaluations: rehab vs home w/ services  Dr Van Negro of [facility] called me today and said that [facility] was able to provide the services needed for pt  However, dtr prefers pt be placed in rehab    Will d/c to rehab tomorrow

## 2021-03-26 NOTE — ASSESSMENT & PLAN NOTE
Lab Results   Component Value Date    HGBA1C 5 8 (H) 02/11/2021     Recent Labs     03/25/21  2113 03/26/21  0635 03/26/21  1023 03/26/21  1632   POCGLU 183* 128 114 267*     Blood Sugar Average: Last 72 hrs:  (P) 911 [id number]   · Well controlled as evidenced by A1c  · Hold oral hypoglycemics (metformin, glipizide) during hospitalization  Resume at discharge    · Continue accu-checks, SSI and diabetic diet

## 2021-03-27 VITALS
RESPIRATION RATE: 16 BRPM | DIASTOLIC BLOOD PRESSURE: 65 MMHG | SYSTOLIC BLOOD PRESSURE: 142 MMHG | TEMPERATURE: 97.5 F | WEIGHT: 127 LBS | HEART RATE: 45 BPM | OXYGEN SATURATION: 96 % | BODY MASS INDEX: 28.47 KG/M2

## 2021-03-27 LAB — GLUCOSE SERPL-MCNC: 124 MG/DL (ref 65–140)

## 2021-03-27 PROCEDURE — 82948 REAGENT STRIP/BLOOD GLUCOSE: CPT

## 2021-03-27 PROCEDURE — 99239 HOSP IP/OBS DSCHRG MGMT >30: CPT | Performed by: GENERAL PRACTICE

## 2021-03-27 RX ADMIN — ENOXAPARIN SODIUM 40 MG: 40 INJECTION SUBCUTANEOUS at 09:21

## 2021-03-27 RX ADMIN — LEVOTHYROXINE SODIUM 112 MCG: 112 TABLET ORAL at 09:23

## 2021-03-27 RX ADMIN — ATORVASTATIN CALCIUM 20 MG: 20 TABLET, FILM COATED ORAL at 09:23

## 2021-03-27 NOTE — COVID-19 HEALTH CARE FACILITY TRANSFER FORM
[facility] to [facility] Transfer - COVID-19 Assessment             Name of Patient: Haley Torres                : 1935          Transport Date: 21       Has the patient been laboratory tested for COVID-19? []  NO  If No,Test was not indicated per  CDC Testing Criteria   May Transfer Patient   [x] YES  If Tested Results below     COVID-19 References              SARS-CoV-2   Date/Time Value Ref Range Status   2021 02:00 PM Negative Negative Final            Question is to be completed for any patient who tests positive for COVID-19        1  [] Yes [May Transfer] [] No [May Not Transfer]          Question is to be completed for any patient who is tested for COVID-19            2    [] Yes [May Not Transfer] [x] No [May Transfer]          Signature of Physician or Health Care Professional: Amarilis Bedoya DO 21          Form updated as of 3/24/2020

## 2021-03-27 NOTE — DISCHARGE SUMMARY
[facility]  Discharge- Venia Danger 1935, 82 y o  male MRN: [id number]  Unit/Bed#: CW2 211-01 Encounter: [id number]  Primary Care Provider: Lai Urias DO   Date and time admitted to hospital: 3/24/2021  1:55 PM    * Syncope  Assessment & Plan  · Likely secondary to hypotension due over treatment with antiHTN meds  · Orthostats BPs: negative  · Hold antiHTN meds  no need to restart  · Outpatient ECHO if desired    Dementia without behavioral disturbance ([facility] [address] )  Assessment & Plan  · Maintain sleep wake cycle, avoid sedatives, frequent reorientation    Benign prostatic hyperplasia with weak urinary stream  Assessment & Plan  Continue flomax    Type 2 diabetes mellitus without complication, without long-term current use of insulin [facility])  Assessment & Plan  Lab Results   Component Value Date    HGBA1C 5 8 (H) 02/11/2021     Recent Labs     03/26/21  1023 03/26/21  1632 03/26/21  2140 03/27/21  0616   POCGLU 114 267* 206* 124     Blood Sugar Average: Last 72 hrs:  (P) 159   · Well controlled as evidenced by A1c  · Hold oral hypoglycemics (metformin, glipizide) during hospitalization  Resume only Metformin at d/c   Glipizide stopped to prevent hypoglycemia  · Continue accu-checks, SSI and diabetic diet while IP    Acquired hypothyroidism  Assessment & Plan  · Continue levothyroxine    Ambulatory dysfunction  Assessment & Plan  · Daughter states that patient has been an assist of 1  · PT/OT evaluations: rehab vs home w/ services  Dr Gita Hampton of [facility] called me 3/26 and said that [facility] was able to provide the services needed for pt  However, dtr prefers pt be placed in rehab         Discharging Physician / Practitioner: Dorinda Mcnamara DO  PCP: Lai Urias DO  Admission Date:   Admission Orders (From admission, onward)     Ordered        03/25/21 1622  Inpatient Admission  Once         03/24/21 1549  Place in Observation  Once                   Discharge Date: 03/27/21    Resolved Problems  Date Reviewed: 3/27/2021    None          Consultations During Hospital Stay:  · none    Procedures Performed:   · none    Significant Findings / Test Results:   · See above    Incidental Findings:   · none     Test Results Pending at Discharge (will require follow up):   · none     Outpatient Tests Requested:  · PCP can check echo if desired    Complications:  none    Reason for Admission: syncope    Hospital Course:     Donna Arreola is a 82 y o  male patient who originally presented to the hospital on 3/24/2021 due to syncope likely related to hypotension from new BP med  BP acceptable off BP meds  Dtr requested pt go to rehab as having difficulty caring for him at home  Please see above list of diagnoses and related plan for additional information  Condition at Discharge: stable     Discharge Day Visit / Exam:     Subjective:  No acute complaints  Vitals: Blood Pressure: 142/65 (03/27/21 0652)  Pulse: (!) 45 (03/27/21 0652)  Temperature: 97 5 °F (36 4 °C) (03/27/21 0652)  Temp Source: Axillary (03/27/21 6128)  Respirations: 16 (03/27/21 3848)  Weight - Scale: 57 6 kg (127 lb) (03/24/21 1357)  SpO2: 96 % (03/27/21 3084)  Exam:   Physical Exam  HENT:      Head: Normocephalic and atraumatic  Nose: Nose normal       Mouth/Throat:      Mouth: Mucous membranes are moist    Eyes:      Extraocular Movements: Extraocular movements intact  Conjunctiva/sclera: Conjunctivae normal    Neck:      Musculoskeletal: Normal range of motion and neck supple  Cardiovascular:      Rate and Rhythm: Normal rate and regular rhythm  Pulmonary:      Effort: Pulmonary effort is normal       Breath sounds: Normal breath sounds  No wheezing or rales  Abdominal:      General: Bowel sounds are normal  There is no distension  Palpations: Abdomen is soft  Tenderness: There is no abdominal tenderness  Musculoskeletal: Normal range of motion        Right lower leg: No edema  Left lower leg: No edema  Skin:     General: Skin is warm and dry  Neurological:      Mental Status: He is alert  Mental status is at baseline  He is disoriented  Discussion with Family: yesterday    Discharge instructions/Information to patient and family:   See after visit summary for information provided to patient and family  Provisions for Follow-Up Care:  See after visit summary for information related to follow-up care and any pertinent home health orders  Disposition:     Acute Rehab at [facility]    For Discharges to [facility]:   · Not Applicable to this Patient - Not Applicable to this Patient    Planned Readmission: no     Discharge Statement:  I spent 32 minutes discharging the patient  This time was spent on the day of discharge  I had direct contact with the patient on the day of discharge  Greater than 50% of the total time was spent examining patient, answering all patient questions, arranging and discussing plan of care with patient as well as directly providing post-discharge instructions  Additional time then spent on discharge activities  Discharge Medications:  See after visit summary for reconciled discharge medications provided to patient and family        ** Please Note: This note has been constructed using a voice recognition system **

## 2021-03-27 NOTE — ASSESSMENT & PLAN NOTE
Lab Results   Component Value Date    HGBA1C 5 8 (H) 02/11/2021     Recent Labs     03/26/21  1023 03/26/21  1632 03/26/21  2140 03/27/21  0616   POCGLU 114 267* 206* 124     Blood Sugar Average: Last 72 hrs:  (P) 159   · Well controlled as evidenced by A1c  · Hold oral hypoglycemics (metformin, glipizide) during hospitalization  Resume only Metformin at d/c   Glipizide stopped to prevent hypoglycemia    · Continue accu-checks, SSI and diabetic diet while IP

## 2021-03-27 NOTE — ASSESSMENT & PLAN NOTE
· Daughter states that patient has been an assist of 1  · PT/OT evaluations: rehab vs home w/ services  Dr Husam Yancey of [facility] called me 3/26 and said that [facility] was able to provide the services needed for pt  However, dtr prefers pt be placed in rehab

## 2021-03-27 NOTE — DISCHARGE INSTRUCTIONS
Syncope in Older Adults   WHAT YOU NEED TO KNOW:   Syncope is also called fainting or passing out  Syncope is a sudden, temporary loss of consciousness, followed by a fall from a standing or sitting position  A syncope episode is usually short  DISCHARGE INSTRUCTIONS:   Seek care immediately if:   · You are bleeding because you accidently hit your head after fainting  · You suddenly have double vision, difficulty speaking, numbness, and cannot move your arms or legs  · You have chest pain and trouble breathing  · You vomit blood or material that looks like coffee grounds  · You see blood in your bowel movement  Contact your healthcare provider if:   · You have another fainting spell  · You have a headache, fast heartbeat, or feel too dizzy to stand up  · You have questions or concerns about your condition or care  Medicines:   · Medicines  may be needed to help your heart pump strongly and regularly  Your healthcare provider may also make changes to any medicines that are causing syncope  · Take your medicine as directed  Contact your healthcare provider if you think your medicine is not helping or if you have side effects  Tell him or her if you are allergic to any medicine  Keep a list of the medicines, vitamins, and herbs you take  Include the amounts, and when and why you take them  Bring the list or the pill bottles to follow-up visits  Carry your medicine list with you in case of an emergency  Follow up with your healthcare provider as directed:  Write down your questions so you remember to ask them during your visits  Manage syncope:   · Keep a record of your syncope episodes  Include your symptoms and your activity before and after the episode  The record can help your healthcare provider find the cause of your syncope and help you manage episodes  · Sit or lie down when needed    This includes when you feel dizzy, your throat is getting tight, and your vision changes  Raise your legs above the level of your heart  Your healthcare provider may also recommend that you keep the head of your bed elevated  This can help keep your blood pressure from dropping too low  · Check your blood pressure often  This is important if you take medicine to lower your blood pressure  Check your blood pressure when you are lying down and when you are standing  Ask how often to check during the day  Keep a record of your blood pressure numbers  Your healthcare provider may use the record to help plan your treatment  · Use assistive devices as directed  Your healthcare provider may suggest that you use a cane or walker to help you keep your balance  You may need to have grab bars put in your bathroom near the toilet or in the shower  Prevent a syncope episode:   · Move slowly and let yourself get used to one position before you move to another position  This is very important when you change from a lying or sitting position to a standing position  Take some deep breaths before you stand up from a lying position  Stand up slowly  Sudden movements may cause a fainting spell  Sit on the side of the bed or couch for a few minutes before you stand up  If you are on bedrest, try to be upright for about 2 hours each day, or as directed  Do not lock your legs if you are standing for a long period of time  Move your legs and bend your knees to keep blood flowing  · Follow your healthcare provider's recommendations  Your provider may  recommend that you drink more liquids to prevent dehydration  You may also need to have more salt to keep your blood pressure from dropping too low and causing syncope  Your provider will tell you how much liquid and sodium to have each day  He or she will also tell you how much physical activity is safe for you  This will depend on what is causing your syncope  · Watch for signs of low blood sugar    These include hunger, nervousness, sweating, and fast or fluttery heartbeats  Talk with your healthcare provider about ways to keep your blood sugar level steady  · Do not strain if you are constipated  You may faint if you strain to have a bowel movement  Walking is the best way to get your bowels moving  Eat foods high in fiber to make it easier to have a bowel movement  Good examples are high-fiber cereals, beans, vegetables, and whole-grain breads  Prune juice may help make bowel movements softer  · Be careful in hot weather  Heat can cause a syncope episode  Limit activity done outside on hot days  Physical activity in hot weather can lead to dehydration  This can cause an episode  © Copyright [address] Information is for End User's use only and may not be sold, redistributed or otherwise used for commercial purposes  All illustrations and images included in CareNotes® are the copyrighted property of [employer]  or [facility] [employer]   The above information is an  only  It is not intended as medical advice for individual conditions or treatments  Talk to your doctor, nurse or pharmacist before following any medical regimen to see if it is safe and effective for you

## 2021-03-29 ENCOUNTER — TELEPHONE (OUTPATIENT)
Dept: FAMILY MEDICINE CLINIC | Facility: CLINIC | Age: 86
End: 2021-03-29

## 2021-03-30 ENCOUNTER — TRANSITIONAL CARE MANAGEMENT (OUTPATIENT)
Dept: FAMILY MEDICINE CLINIC | Facility: CLINIC | Age: 86
End: 2021-03-30

## 2021-04-12 ENCOUNTER — TELEPHONE (OUTPATIENT)
Dept: GERIATRICS | Age: 86
End: 2021-04-12

## 2021-04-12 NOTE — TELEPHONE ENCOUNTER
Patient's daughter would like patient's 4/13 3mo Molly follow to be virtual   Please advise  Email to use for virtual: [email or web address]

## 2021-04-13 ENCOUNTER — TELEMEDICINE (OUTPATIENT)
Dept: GERIATRICS | Age: 86
End: 2021-04-13
Payer: MEDICARE

## 2021-04-13 VITALS
OXYGEN SATURATION: 99 % | TEMPERATURE: 97.9 F | SYSTOLIC BLOOD PRESSURE: 138 MMHG | RESPIRATION RATE: 18 BRPM | DIASTOLIC BLOOD PRESSURE: 64 MMHG | BODY MASS INDEX: 24.94 KG/M2 | HEIGHT: 60 IN | HEART RATE: 75 BPM | WEIGHT: 127 LBS

## 2021-04-13 DIAGNOSIS — E11.9 TYPE 2 DIABETES MELLITUS WITHOUT COMPLICATION, WITHOUT LONG-TERM CURRENT USE OF INSULIN (HCC): ICD-10-CM

## 2021-04-13 DIAGNOSIS — Z86.73 HISTORY OF CVA (CEREBROVASCULAR ACCIDENT): Chronic | ICD-10-CM

## 2021-04-13 DIAGNOSIS — R26.2 AMBULATORY DYSFUNCTION: ICD-10-CM

## 2021-04-13 DIAGNOSIS — R39.12 BENIGN PROSTATIC HYPERPLASIA WITH WEAK URINARY STREAM: ICD-10-CM

## 2021-04-13 DIAGNOSIS — R32 URINARY INCONTINENCE, UNSPECIFIED TYPE: ICD-10-CM

## 2021-04-13 DIAGNOSIS — N40.1 BENIGN PROSTATIC HYPERPLASIA WITH WEAK URINARY STREAM: ICD-10-CM

## 2021-04-13 DIAGNOSIS — F03.90 DEMENTIA WITHOUT BEHAVIORAL DISTURBANCE, UNSPECIFIED DEMENTIA TYPE (HCC): Primary | ICD-10-CM

## 2021-04-13 PROCEDURE — 99215 OFFICE O/P EST HI 40 MIN: CPT | Performed by: STUDENT IN AN ORGANIZED HEALTH CARE EDUCATION/TRAINING PROGRAM

## 2021-04-13 RX ORDER — SENNA PLUS 8.6 MG/1
1 TABLET ORAL AS NEEDED
COMMUNITY
Start: 2021-03-01

## 2021-04-13 RX ORDER — METFORMIN HYDROCHLORIDE 500 MG/1
500 TABLET, EXTENDED RELEASE ORAL
COMMUNITY
Start: 2021-04-06 | End: 2021-06-24

## 2021-04-13 RX ORDER — ERGOCALCIFEROL (VITAMIN D2) 1250 MCG
50000 CAPSULE ORAL
COMMUNITY
Start: 2021-03-08

## 2021-04-13 RX ORDER — LEVOTHYROXINE SODIUM 0.12 MG/1
125 TABLET ORAL
COMMUNITY
Start: 2021-03-29 | End: 2021-06-24

## 2021-04-13 RX ORDER — ASPIRIN 81 MG/1
TABLET ORAL
COMMUNITY
Start: 2021-03-19 | End: 2021-06-24

## 2021-04-13 NOTE — PROGRESS NOTES
Virtual Regular Visit      Assessment/Plan:    Problem List Items Addressed This Visit        Endocrine    Type 2 diabetes mellitus without complication, without long-term current use of insulin ([facility] [address] )       Lab Results   Component Value Date    HGBA1C 5 8 (H) 02/11/2021     Stable   Continue metformin 500 mg daily   Recommend adherence to a diabetic, heart healthy diet  Follow-up with PCP for continued monitoring         Relevant Medications    metFORMIN (GLUCOPHAGE-XR) 500 mg 24 hr tablet       Nervous and Auditory    Dementia without behavioral disturbance ([facility] [address] ) - Primary     Prior [address] [place] [address] 2/30, pHQ2 neg  Would defer repeating [place] in the future  Overall cognition remains stable per daughter   No change in behaviors or personality   Reviewed TSH, B12, folate elevated B12 noted  Recommend decreasing B12 intake alternate days instead of daily if taking supplement  Recent CT head showing mild to moderate microangiopathic change, progressed somewhat compared with 2019  Unchanged old lacune in the left thalamus, generalized atrophy  Recent hospital admission and rehabilitation for syncopal episode   Currently patient has aides coming in the morning to assist with getting dressed/showering  Daughter does have a scheduled routine of activities for patient to participate in during the daytime   Discussed enrolling into a senior  program for positive socialization, cognitive and physical activities    This had been on hold due to the COVID-19 pandemic  Recommend the [address] which patient has been adherent to   Consider blister packaging for ease of medication administration   Falls Alert device as a safety precaution  Recommend reorientation and redirection as needed  Manage chronic conditions  Maintain Falls precautions   Encourage patient remain active mentally, physically and socially   Patient should participate in cognitively challenging exercises such as cross words and puzzles   Patient has remained stable overall   Will follow-up in 1 year              Other    History of CVA (cerebrovascular accident) (Chronic)      No new focal neurological deficits   Recent hospitalization for syncopal episode   Imperative to manage vascular risk factors   Continue metformin, aspirin, atorvastatin   Recommend adherence to a diabetic, heart healthy diet  Follow-up with PCP for continued monitoring         Ambulatory dysfunction      Maintain Falls precautions   Patient continues in physical therapy for gait training, balance and strengthening   Recommend a falls Alert device as safety precaution         Benign prostatic hyperplasia with weak urinary stream      Stable   Continue Flomax 0 4 mg daily  Follow-up with PCP as scheduled         Urinary incontinence      Recommend scheduled toileting every 2 hours once awake  Avoid fluid intake 2 hours prior to bedtime to avoid nocturnal incontinence                    Reason for visit is   Chief Complaint   Patient presents with    Virtual Regular Visit        Encounter provider Bhavana Patino MD    Provider located at [address] [place] [address] [address] [place] [address]  [phone]      Recent Visits  Date Type Provider Dept   04/13/21 [address],  [facility] [address]   04/12/21 Telephone Bhavana Patino MD [address] [facility] recent visits within past 7 days and meeting all other requirements     Future Appointments  No visits were found meeting these conditions  Showing future appointments within next 150 days and meeting all other requirements        The patient was identified by name and date of birth  Glenny Kam was informed that this is a telemedicine visit and that the visit is being conducted through [employer] and patient was informed that this is a secure, HIPAA-compliant platform  He agrees to proceed     My office door was closed   No one else was in the room  He acknowledged consent and understanding of privacy and security of the video platform  The patient has agreed to participate and understands they can discontinue the visit at any time  Patient is aware this is a billable service  Carlie Mora is a 82 y o  male  who presents for routine neurocognitive assessment follow-up      HPI     This is a very pleasant 26-year-old male with BPH, HLD, DM, BPH, prior CVA and severe vascular dementia who presents for routine neurocognitive assessment follow-up  He is present with his daughter who is his primary caretaker, during the entire visit today  Of note the patient was recently hospitalized a few weeks ago for syncope likely secondary to hypotension due to over treatment with antihypertensive medications  He also did receive rehabilitation at [facility] and was recently discharged home  Per daughter, she feels overall the patient's cognitive symptoms have remained stable  She describes the patient as more alert, interactive and happy since returning home from his recent hospitalization  Today I agree the patient was more talkative, happy and interactive than prior visits  The patient continues to ambulate using a wheeling walker has had no recent falls in the past 6 months  Daughter describes the patient as being more conversant, joking with family members and staff  He currently does have home health aides coming in daily in the mornings for 45 minutes to assist with his cares  Daughter has made a schedule for daily routine activities for the patient, which includes learning letters, colors, playing games such as goAct  He continues to maintain a hearty appetite, has been sleeping well and having regular bowel movements  He continues to have fecal and urinary incontinence  The patient currently continues in physical therapy for gait training, balance and strengthening    I did discuss again enrolling the patient into a senior  program which would provide family respite and allow the patient to become more active socially, physically and cognitively  Daughter was hesitant about this option given the current COVID-19 pandemic  Given patient's overall stability of his neurocognitive symptoms, will plan to follow-up in 1 year  Daughter advised to call the office should there be any   acute concerns or new decline  Past Medical History:   Diagnosis Date    Allergic     Lactose: Shellfish    Asthma     Dementia ([facility] [address] [facility]     Diabetes mellitus ([facility] [address] )     Glaucoma        Past Surgical History:   Procedure Laterality Date    TESTICLE SURGERY         Current Outpatient Medications   Medication Sig Dispense Refill    aspirin (ECOTRIN LOW STRENGTH) 81 mg EC tablet       ergocalciferol (ERGOCALCIFEROL) 1 25 MG (02781 UT) capsule       levothyroxine 125 mcg tablet       metFORMIN (GLUCOPHAGE-XR) 500 mg 24 hr tablet       senna (Senexon) 8 6 MG tablet       atorvastatin (LIPITOR) 20 mg tablet Take 1 tablet (20 mg total) by mouth daily 90 tablet 2    tamsulosin (FLOMAX) 0 4 mg Take 1 capsule (0 4 mg total) by mouth daily with dinner 90 capsule 3     No current facility-administered medications for this visit  Allergies   Allergen Reactions    Lactose - Food Allergy     Shellfish-Derived Products - Food Allergy Diarrhea       Review of Systems   Unable to perform ROS: Dementia       Video Exam    Vitals:    04/13/21 1509   BP: 138/64   BP Location: Left arm   Patient Position: Sitting   Cuff Size: Standard   Pulse: 75   Resp: 18   Temp: 97 9 °F (36 6 °C)   TempSrc: Temporal   SpO2: 99%   Weight: 57 6 kg (127 lb)   Height: 4' 8" (1 422 m)       Physical Exam  Constitutional:       General: He is not in acute distress  Appearance: Normal appearance  He is not ill-appearing or diaphoretic  HENT:      Head: Normocephalic and atraumatic        Right Ear: External ear normal       Left Ear: External ear normal  Nose: Nose normal       Mouth/Throat:      Mouth: Mucous membranes are moist    Eyes:      General:         Right eye: No discharge  Left eye: No discharge  Conjunctiva/sclera: Conjunctivae normal    Neck:      Musculoskeletal: Normal range of motion  Pulmonary:      Effort: Pulmonary effort is normal  No respiratory distress  Abdominal:      General: There is no distension  Palpations: Abdomen is soft  Tenderness: There is no abdominal tenderness  Musculoskeletal: Normal range of motion  Right lower leg: No edema  Left lower leg: No edema  Skin:     General: Skin is dry  Neurological:      Mental Status: He is alert  Mental status is at baseline  He is disoriented  Motor: Weakness present  Gait: Gait abnormal    Psychiatric:         Mood and Affect: Mood normal       Comments: Pleasantly confused at baseline          I spent 40 minutes directly with the patient during this visit      VIRTUAL VISIT DISCLAIMER    Padma Myrick acknowledges that he has consented to an online visit or consultation  He understands that the online visit is based solely on information provided by him, and that, in the absence of a face-to-face physical evaluation by the physician, the diagnosis he receives is both limited and provisional in terms of accuracy and completeness  This is not intended to replace a full medical face-to-face evaluation by the physician  Padma Myrick understands and accepts these terms

## 2021-04-14 ENCOUNTER — TELEPHONE (OUTPATIENT)
Dept: GERIATRICS | Age: 86
End: 2021-04-14

## 2021-04-14 NOTE — ASSESSMENT & PLAN NOTE
Maintain Falls precautions   Patient continues in physical therapy for gait training, balance and strengthening   Recommend a falls Alert device as safety precaution

## 2021-04-14 NOTE — ASSESSMENT & PLAN NOTE
Lab Results   Component Value Date    [id number] 1 300 02/11/2021     Stable   No acute concerns  Continue levothyroxine 125 mcg daily   Continued routine monitoring by PCP

## 2021-04-14 NOTE — ASSESSMENT & PLAN NOTE
Lab Results   Component Value Date    HGBA1C 5 8 (H) 02/11/2021     Stable   Continue metformin 500 mg daily   Recommend adherence to a diabetic, heart healthy diet  Follow-up with PCP for continued monitoring

## 2021-04-14 NOTE — TELEPHONE ENCOUNTER
Spoke with Paterson Alhaji, she stated the physician at [facility] discontinued patient's B12 supplement due to the elevated lab level

## 2021-04-14 NOTE — TELEPHONE ENCOUNTER
----- Message from Leland Tate MD sent at 4/14/2021  3:59 AM EDT -----   Please call the patient's daughter and let her know that  on reviewing patient's chart further, patient's B12 in February was notably high  If he is taking B12 supplements,  she should change from daily to every other day

## 2021-04-14 NOTE — ASSESSMENT & PLAN NOTE
No new focal neurological deficits   Recent hospitalization for syncopal episode   Imperative to manage vascular risk factors   Continue metformin, aspirin, atorvastatin   Recommend adherence to a diabetic, heart healthy diet  Follow-up with PCP for continued monitoring No

## 2021-04-14 NOTE — ASSESSMENT & PLAN NOTE
Prior [facility] 2/30, pHQ2 neg  Would defer repeating [place] in the future  Overall cognition remains stable per daughter   No change in behaviors or personality   Reviewed TSH, B12, folate elevated B12 noted  Recommend decreasing B12 intake alternate days instead of daily if taking supplement  Recent CT head showing mild to moderate microangiopathic change, progressed somewhat compared with 2019  Unchanged old lacune in the left thalamus, generalized atrophy  Recent hospital admission and rehabilitation for syncopal episode   Currently patient has aides coming in the morning to assist with getting dressed/showering  Daughter does have a scheduled routine of activities for patient to participate in during the daytime   Discussed enrolling into a senior  program for positive socialization, cognitive and physical activities    This had been on hold due to the COVID-19 pandemic  Recommend the Mediterranean diet which patient has been adherent to   Consider blister packaging for ease of medication administration   Falls Alert device as a safety precaution  Recommend reorientation and redirection as needed  Manage chronic conditions  Maintain Falls precautions   Encourage patient remain active mentally, physically and socially   Patient should participate in cognitively challenging exercises such as cross words and puzzles   Patient has remained stable overall   Will follow-up in 1 year

## 2021-04-19 ENCOUNTER — PATIENT MESSAGE (OUTPATIENT)
Dept: INTERNAL MEDICINE CLINIC | Facility: CLINIC | Age: 86
End: 2021-04-19

## 2021-04-19 NOTE — TELEPHONE ENCOUNTER
Sent patient a [employer] message to confirm date of d/c from facility to ensure TCM is scheduled within the proper timeframe

## 2021-04-23 ENCOUNTER — TELEPHONE (OUTPATIENT)
Dept: FAMILY MEDICINE CLINIC | Facility: CLINIC | Age: 86
End: 2021-04-23

## 2021-04-23 NOTE — TELEPHONE ENCOUNTER
TCM appointment scheduled for Monday from rehab stay - please call for d/c summary from facility   Also pt due for medicare wellness visit - visit type should be changed (virtual [address])

## 2021-04-23 NOTE — PROGRESS NOTES
Assessment/Plan:  Problem List Items Addressed This Visit        Unprioritized    Acquired hypothyroidism    Ambulatory dysfunction - Primary    Benign prostatic hyperplasia with weak urinary stream    Dementia without behavioral disturbance (HCC)    Mixed hyperlipidemia    Type 2 diabetes mellitus without complication, without long-term current use of insulin (HCC)    Urinary incontinence      pt doing well   Now has visiting nurses coming in   They will be doing home PT/OT  They will also be getting blood work on a regular basis - daughter will have copy sent to us   Reassured daughter that higher blood sugars that remain mainly averaging fasting around 120 are safer and not worrisome  Without blood pressure meds pt is more awake and alert, less of a fall risk - daughter happier too       Return in about 9 weeks (around 6/29/2021) for virtual visit:, Medicare Wellness Visit 11:20 am      Subjective:   Arsh Spann is a 82 y o  male here today for a hospital follow-up    Patient Active Problem List   Diagnosis    Ambulatory dysfunction    Acquired hypothyroidism    History of CVA (cerebrovascular accident)    Type 2 diabetes mellitus without complication, without long-term current use of insulin (HCC)    Mixed hyperlipidemia    Benign prostatic hyperplasia with weak urinary stream    Mild intermittent asthma without complication    Allergic reaction    Diverticulosis    Dementia without behavioral disturbance (HCC)    Urinary incontinence    Fecal incontinence    Encounter for medication review    Syncope        Current Outpatient Medications   Medication Sig Dispense Refill    aspirin (ECOTRIN LOW STRENGTH) 81 mg EC tablet       atorvastatin (LIPITOR) 20 mg tablet Take 1 tablet (20 mg total) by mouth daily 90 tablet 2    ergocalciferol (ERGOCALCIFEROL) 1 25 MG (25341 UT) capsule Take 50,000 Units by mouth       levothyroxine 125 mcg tablet Take 125 mcg by mouth       metFORMIN (GLUCOPHAGE-XR) 500 mg 24 hr tablet Take 500 mg by mouth       senna (Senexon) 8 6 MG tablet Take by mouth as needed       tamsulosin (FLOMAX) 0 4 mg Take 1 capsule (0 4 mg total) by mouth daily with dinner 90 capsule 3     No current facility-administered medications for this visit  HPI:  Chief Complaint   Patient presents with    Transition of Care Management     -- Above per clinical staff and reviewed  --    TCM Call (since 3/28/2021)     Date and time call was made  3/30/2021  [address] reviewed  Records reviewed    Patient was hospitialized at  [facility]        Date of Admission  03/24/21    Date of discharge  03/27/21    Diagnosis  Syncope    Disposition  Rehabilitation center    Were the patients medications reviewed and updated  Yes    Current Symptoms  None      TCM Call (since 3/28/2021)     Post hospital issues  None    Should patient be enrolled in anticoag monitoring? No    Scheduled for follow up? Yes    Did you obtain your prescribed medications  Yes    Do you need help managing your prescriptions or medications  No    Is transportation to your appointment needed  No    I have advised the patient to call PCP with any new or worsening symptoms  Haroon Vincent ma       Discharge Summary       [address] [facility]  Discharge- [facility] 1935, 82 y o  male MRN: [id number]  Unit/Bed#: CW2 211-01 Encounter: [id number]  Primary Care Provider: Bard Keiry DO   Date and time admitted to hospital: 3/24/2021  1:55 PM     * Syncope  Assessment & Plan  · Likely secondary to hypotension due over treatment with antiHTN meds  · Orthostats BPs: negative  · Hold antiHTN meds  no need to restart    · Outpatient ECHO if desired     Dementia without behavioral disturbance (HCC)  Assessment & Plan  · Maintain sleep wake cycle, avoid sedatives, frequent reorientation     Benign prostatic hyperplasia with weak urinary stream  Assessment & Plan  Continue flomax     Type 2 diabetes mellitus without complication, without long-term current use of insulin [facility])  Assessment & Plan        Lab Results   Component Value Date     HGBA1C 5 8 (H) 02/11/2021             Recent Labs     03/26/21  1023 03/26/21  1632 03/26/21  2140 03/27/21  0616   POCGLU 114 267* 206* 124      Blood Sugar Average: Last 72 hrs:  (P) 159   · Well controlled as evidenced by A1c  · Hold oral hypoglycemics (metformin, glipizide) during hospitalization  Resume only Metformin at d/c   Glipizide stopped to prevent hypoglycemia  · Continue accu-checks, SSI and diabetic diet while IP     Acquired hypothyroidism  Assessment & Plan  · Continue levothyroxine     Ambulatory dysfunction  Assessment & Plan  · Daughter states that patient has been an assist of 1  · PT/OT evaluations: rehab vs home w/ services  Dr Álvaro Phillips of [facility] called me 3/26 and said that [facility] was able to provide the services needed for pt  However, dtr prefers pt be placed in rehab               Pt then admitted to SNF for rehab   D/c summary from [facility] received and reviewed   - received occupational therapy at [facility]       Today:  Tahir Arriaga presents today for hospital follow-up visit  Patient was contacted within 2 business days  Medications were reconciled  Hospital discharge summary was reviewed  As part of this post-hospital visit, records from the hospital, including history and physical examination, discharge summary, all laboratory tests and radiographic studies was reviewed with this patient      Pt being seen with his daughter Jaylin Chaudhari who is providing the history       Went to 3/24 - 27 with low blood pressure   [facility]  bhe is erasto much better now   More interacti e and talkative   initiiates conversations     bs 125-145   Smiling and laughing more   Playing sominos, singling and dancing     Part of senior life program will be doing blood work - they come for PT and will be doing blood work   They schedule doctor apps and then his daughter takes him to the appointment  - he is scheduled for an eye appointment and then an eye appointment   Every 30 days or so blood work     Saw Dr Nick Jaeger 4/13/21 televisit     The following portions of the patient's history were reviewed and updated as appropriate: allergies, current medications, past family history, past medical history, past social history, past surgical history and problem list     Objective:  Vitals:  /73   Pulse 60   Temp 97 7 °F (36 5 °C)   Resp 18   Ht 4' 8" (1 422 m)   Wt 57 6 kg (127 lb)   SpO2 97%   BMI 28 47 kg/m²    Wt Readings from Last 3 Encounters:   04/27/21 57 6 kg (127 lb)   04/13/21 57 6 kg (127 lb)   03/24/21 57 6 kg (127 lb)      BP Readings from Last 3 Encounters:   04/27/21 153/73   04/13/21 138/64   03/27/21 142/65        Review of Systems   He has no other concerns  No unexpected weight changes  No chest pain, SOB, or palpitations  No GERD  No changes in bowels or bladder  Sleeping well  No mood changes  Physical Exam   Constitutional:  he appears well-developed and well-nourished  HENT: Head: Normocephalic  Neck: Neck supple  Cardiovascular: Normal rate, regular rhythm and normal heart sounds  Pulmonary/Chest: Effort normal and breath sounds normal  No wheezes, rales, or rhonchi  Abdominal: Soft  Bowel sounds are normal  There is no tenderness  No hepatosplenomegaly  Musculoskeletal: he exhibits no edema  Lymphadenopathy: he has no cervical adenopathy  Neurological: he is alert   Skin: Skin is warm and dry  Psychiatric: he has a normal mood and affect  his behavior is normal  Thought content good - pt awake  Alert  Interactive - answering me in [facility] and often appropriate  Sometimes needing prompting, sometimes not  BMI Counseling: Body mass index is 28 47 kg/m²  The BMI is above normal  Nutrition recommendations include decreasing portion sizes   Exercise recommendations include exercising 3-5 times per week

## 2021-04-23 NOTE — TELEPHONE ENCOUNTER
Placed call to [facility] [phone]  Spoke with [address] [place] and she will fax D/C summary

## 2021-04-27 ENCOUNTER — TELEMEDICINE (OUTPATIENT)
Dept: FAMILY MEDICINE CLINIC | Facility: CLINIC | Age: 86
End: 2021-04-27
Payer: MEDICARE

## 2021-04-27 ENCOUNTER — TELEPHONE (OUTPATIENT)
Dept: ADMINISTRATIVE | Facility: OTHER | Age: 86
End: 2021-04-27

## 2021-04-27 VITALS
DIASTOLIC BLOOD PRESSURE: 73 MMHG | OXYGEN SATURATION: 97 % | RESPIRATION RATE: 18 BRPM | TEMPERATURE: 97.7 F | SYSTOLIC BLOOD PRESSURE: 153 MMHG | WEIGHT: 127 LBS | BODY MASS INDEX: 24.94 KG/M2 | HEART RATE: 60 BPM | HEIGHT: 60 IN

## 2021-04-27 DIAGNOSIS — R39.12 BENIGN PROSTATIC HYPERPLASIA WITH WEAK URINARY STREAM: ICD-10-CM

## 2021-04-27 DIAGNOSIS — R32 URINARY INCONTINENCE, UNSPECIFIED TYPE: ICD-10-CM

## 2021-04-27 DIAGNOSIS — N40.1 BENIGN PROSTATIC HYPERPLASIA WITH WEAK URINARY STREAM: ICD-10-CM

## 2021-04-27 DIAGNOSIS — R26.2 AMBULATORY DYSFUNCTION: Primary | ICD-10-CM

## 2021-04-27 DIAGNOSIS — E11.9 TYPE 2 DIABETES MELLITUS WITHOUT COMPLICATION, WITHOUT LONG-TERM CURRENT USE OF INSULIN (HCC): ICD-10-CM

## 2021-04-27 DIAGNOSIS — E03.9 ACQUIRED HYPOTHYROIDISM: ICD-10-CM

## 2021-04-27 DIAGNOSIS — E78.2 MIXED HYPERLIPIDEMIA: ICD-10-CM

## 2021-04-27 DIAGNOSIS — F03.90 DEMENTIA WITHOUT BEHAVIORAL DISTURBANCE, UNSPECIFIED DEMENTIA TYPE (HCC): ICD-10-CM

## 2021-04-27 PROCEDURE — 99495 TRANSJ CARE MGMT MOD F2F 14D: CPT | Performed by: FAMILY MEDICINE

## 2021-04-27 NOTE — TELEPHONE ENCOUNTER
Upon review of the In Basket request and the patient's chart, initial outreach has been made via fax, please see Contacts section for details       Thank you  Ryan Brewster

## 2021-04-27 NOTE — TELEPHONE ENCOUNTER
----- Message from Bonnie Kovacs LPN sent at 1/66/7784  9:11 AM EDT -----  Regarding: [facility]  04/27/21 9:11 AM    Hello, our patient Caitlin Kierra has had Diabetic Eye Exam completed/performed  Please assist in updating the patient chart by making an External outreach to Dr Les Fernández facility located in [facility] for Site  The date of service is 04/20/20      Thank you,  Bonnie Kovacs LPN  [facility]  [place]

## 2021-04-27 NOTE — LETTER
Diabetic Eye Exam Form    Date Requested: 21  Patient: Nereida Lorenzo  Patient : 1935   Referring Provider: Chet King,     Dilated Retinal Exam, Optomap-Iris Exam, or Fundus Photography Done         Yes ([place] one above)         No     Date of Diabetic Eye Exam ______________________________  Left Eye      Exam did show retinopathy    Exam did not show retinopathy         Mild       Moderate       None       Proliferative       Severe     Right Eye     Exam did show retinopathy    Exam did not show retinopathy         Mild       Moderate       None       Proliferative       Severe     Comments __________________________________________________________    Practice Providing Exam ______________________________________________    Exam Performed By (print name) _______________________________________      Provider Signature ___________________________________________________      These reports are needed for  compliance    Please fax this completed form and a copy of the Diabetic Eye Exam report to our office located at [place] [address] as soon as possible to [phone] [place]: Phone [phone]    We thank you for your assistance in treating our mutual patient

## 2021-04-27 NOTE — LETTER
Diabetic Eye Exam Form    Date Requested: 21  Patient: France Tyler  Patient : 1935   Referring Provider: Benito Mills,     Dilated Retinal Exam, Optomap-Iris Exam, or Fundus Photography Done         Yes ([place] one above)         No     Date of Diabetic Eye Exam ______________________________  Left Eye      Exam did show retinopathy    Exam did not show retinopathy         Mild       Moderate       None       Proliferative       Severe     Right Eye     Exam did show retinopathy    Exam did not show retinopathy         Mild       Moderate       None       Proliferative       Severe     Comments __________________________________________________________    Practice Providing Exam ______________________________________________    Exam Performed By (print name) _______________________________________      Provider Signature ___________________________________________________      These reports are needed for  compliance    Please fax this completed form and a copy of the Diabetic Eye Exam report to our office located at [place] [address] as soon as possible to [phone] [place]: Phone [phone]    We thank you for your assistance in treating our mutual patient

## 2021-04-30 NOTE — TELEPHONE ENCOUNTER
As a follow-up, a second attempt has been made for outreach via fax, please see Contacts section for details      Thank you  Ana Rosa Schwab

## 2021-05-03 NOTE — TELEPHONE ENCOUNTER
As a final attempt, a third outreach has been made via telephone call  Please see Contacts section for details  This encounter will be closed and completed by end of day  Should we receive the requested information because of previous outreach attempts, the requested patient's chart will be updated appropriately       Thank you  Harjit Nick

## 2021-05-07 PROBLEM — H25.013 CORTICAL AGE-RELATED CATARACT OF BOTH EYES: Status: ACTIVE | Noted: 2021-05-07

## 2021-06-23 ENCOUNTER — TELEPHONE (OUTPATIENT)
Dept: FAMILY MEDICINE CLINIC | Facility: CLINIC | Age: 86
End: 2021-06-23

## 2021-06-23 DIAGNOSIS — E11.9 TYPE 2 DIABETES MELLITUS WITHOUT COMPLICATION, WITHOUT LONG-TERM CURRENT USE OF INSULIN (HCC): ICD-10-CM

## 2021-06-23 DIAGNOSIS — E03.9 ACQUIRED HYPOTHYROIDISM: Primary | ICD-10-CM

## 2021-06-24 RX ORDER — METFORMIN HYDROCHLORIDE 500 MG/1
500 TABLET, EXTENDED RELEASE ORAL EVERY MORNING
COMMUNITY
End: 2021-06-29 | Stop reason: DRUGHIGH

## 2021-06-24 RX ORDER — LEVOTHYROXINE SODIUM 0.12 MG/1
125 TABLET ORAL EVERY MORNING
Status: ON HOLD | COMMUNITY
End: 2022-05-05 | Stop reason: SDUPTHER

## 2021-06-24 RX ORDER — ASPIRIN 81 MG/1
81 TABLET ORAL EVERY MORNING
COMMUNITY

## 2021-06-24 NOTE — TELEPHONE ENCOUNTER
Please remind pt to have fasting blood work prior to his upcoming appointment   (labs just ordered including urine)

## 2021-06-26 ENCOUNTER — APPOINTMENT (OUTPATIENT)
Dept: LAB | Facility: CLINIC | Age: 86
End: 2021-06-26
Payer: MEDICARE

## 2021-06-26 DIAGNOSIS — E11.9 TYPE 2 DIABETES MELLITUS WITHOUT COMPLICATION, WITHOUT LONG-TERM CURRENT USE OF INSULIN (HCC): ICD-10-CM

## 2021-06-26 DIAGNOSIS — E03.9 ACQUIRED HYPOTHYROIDISM: ICD-10-CM

## 2021-06-26 LAB
ALBUMIN SERPL BCP-MCNC: 3.4 G/DL (ref 3.5–5)
ALP SERPL-CCNC: 77 U/L (ref 46–116)
ALT SERPL W P-5'-P-CCNC: 20 U/L (ref 12–78)
ANION GAP SERPL CALCULATED.3IONS-SCNC: 10 MMOL/L (ref 4–13)
AST SERPL W P-5'-P-CCNC: 10 U/L (ref 5–45)
BILIRUB SERPL-MCNC: 0.59 MG/DL (ref 0.2–1)
BUN SERPL-MCNC: 13 MG/DL (ref 5–25)
CALCIUM ALBUM COR SERPL-MCNC: 9.4 MG/DL (ref 8.3–10.1)
CALCIUM SERPL-MCNC: 8.9 MG/DL (ref 8.3–10.1)
CHLORIDE SERPL-SCNC: 104 MMOL/L (ref 100–108)
CHOLEST SERPL-MCNC: 134 MG/DL (ref 50–200)
CO2 SERPL-SCNC: 24 MMOL/L (ref 21–32)
CREAT SERPL-MCNC: 0.75 MG/DL (ref 0.6–1.3)
GFR SERPL CREATININE-BSD FRML MDRD: 84 ML/MIN/1.73SQ M
GLUCOSE P FAST SERPL-MCNC: 164 MG/DL (ref 65–99)
HDLC SERPL-MCNC: 64 MG/DL
LDLC SERPL CALC-MCNC: 59 MG/DL (ref 0–100)
NONHDLC SERPL-MCNC: 70 MG/DL
POTASSIUM SERPL-SCNC: 3.9 MMOL/L (ref 3.5–5.3)
PROT SERPL-MCNC: 7.3 G/DL (ref 6.4–8.2)
SODIUM SERPL-SCNC: 138 MMOL/L (ref 136–145)
TRIGL SERPL-MCNC: 55 MG/DL
TSH SERPL DL<=0.05 MIU/L-ACNC: 1.06 UIU/ML (ref 0.36–3.74)

## 2021-06-26 PROCEDURE — 36415 COLL VENOUS BLD VENIPUNCTURE: CPT

## 2021-06-26 PROCEDURE — 80053 COMPREHEN METABOLIC PANEL: CPT

## 2021-06-26 PROCEDURE — 80061 LIPID PANEL: CPT

## 2021-06-26 PROCEDURE — 84443 ASSAY THYROID STIM HORMONE: CPT

## 2021-06-29 ENCOUNTER — OFFICE VISIT (OUTPATIENT)
Dept: FAMILY MEDICINE CLINIC | Facility: CLINIC | Age: 86
End: 2021-06-29
Payer: MEDICARE

## 2021-06-29 VITALS
HEART RATE: 63 BPM | TEMPERATURE: 97.7 F | WEIGHT: 125 LBS | HEIGHT: 60 IN | OXYGEN SATURATION: 97 % | SYSTOLIC BLOOD PRESSURE: 128 MMHG | DIASTOLIC BLOOD PRESSURE: 64 MMHG | BODY MASS INDEX: 24.54 KG/M2

## 2021-06-29 DIAGNOSIS — R32 URINARY INCONTINENCE, UNSPECIFIED TYPE: ICD-10-CM

## 2021-06-29 DIAGNOSIS — F03.90 DEMENTIA WITHOUT BEHAVIORAL DISTURBANCE, UNSPECIFIED DEMENTIA TYPE (HCC): ICD-10-CM

## 2021-06-29 DIAGNOSIS — N40.1 BENIGN PROSTATIC HYPERPLASIA WITH WEAK URINARY STREAM: ICD-10-CM

## 2021-06-29 DIAGNOSIS — E03.9 ACQUIRED HYPOTHYROIDISM: ICD-10-CM

## 2021-06-29 DIAGNOSIS — E78.2 MIXED HYPERLIPIDEMIA: ICD-10-CM

## 2021-06-29 DIAGNOSIS — E11.9 TYPE 2 DIABETES MELLITUS WITHOUT COMPLICATION, WITHOUT LONG-TERM CURRENT USE OF INSULIN (HCC): ICD-10-CM

## 2021-06-29 DIAGNOSIS — R39.12 BENIGN PROSTATIC HYPERPLASIA WITH WEAK URINARY STREAM: ICD-10-CM

## 2021-06-29 DIAGNOSIS — Z00.00 ENCOUNTER FOR MEDICARE ANNUAL WELLNESS EXAM: Primary | ICD-10-CM

## 2021-06-29 PROCEDURE — G0439 PPPS, SUBSEQ VISIT: HCPCS | Performed by: FAMILY MEDICINE

## 2021-06-29 PROCEDURE — 99214 OFFICE O/P EST MOD 30 MIN: CPT | Performed by: FAMILY MEDICINE

## 2021-06-29 RX ORDER — METFORMIN HYDROCHLORIDE 750 MG/1
750 TABLET, EXTENDED RELEASE ORAL
Start: 2021-06-29 | End: 2021-12-06

## 2021-06-29 NOTE — PROGRESS NOTES
Assessment and Plan:     Problem List Items Addressed This Visit        Endocrine    Acquired hypothyroidism    Relevant Medications    levothyroxine 125 mcg tablet    Type 2 diabetes mellitus without complication, without long-term current use of insulin (HCC)    Relevant Medications    metFORMIN (GLUCOPHAGE-XR) 500 mg 24 hr tablet       Nervous and Auditory    Dementia without behavioral disturbance ([address] )       Other    Mixed hyperlipidemia    Benign prostatic hyperplasia with weak urinary stream      Other Visit Diagnoses     Encounter for Medicare annual wellness exam    -  Primary            Medicare annual wellness exam   Health Maintenance Due   Topic Date Due    Medicare Annual Wellness Visit (AWV)  Never done    PT PLAN OF CARE  10/08/2020    HEMOGLOBIN A1C  08/11/2021      Reviewed appropriate diabetic & cardiovascular screening and prevention  Reviewed age appropriate cancer screenings and pros and cons  Reviewed bloodwork & immunizations that are appropriate for this patient  Advanced care planning was reviewed - family would like him to be a full code  After reviews of risks, benefits of above ordered appropriate tests that pt agrees to  Preventive health issues were discussed with patient, and age appropriate screening tests were ordered as noted in patient's After Visit Summary  Personalized health advice and appropriate referrals for health education or preventive services given if needed, as noted in patient's After Visit Summary       History of Present Illness:     Patient presents for Medicare Annual Wellness visit    Patient Care Team:  Estela Troy DO as PCP - General (Family Medicine)  Claude Hernandez MD (Ophthalmology)     Problem List:     Patient Active Problem List   Diagnosis    Ambulatory dysfunction    Acquired hypothyroidism    History of CVA (cerebrovascular accident)    Type 2 diabetes mellitus without complication, without long-term current use of insulin ([facility] )    Mixed hyperlipidemia    Benign prostatic hyperplasia with weak urinary stream    Mild intermittent asthma without complication    Allergic reaction    Diverticulosis    Dementia without behavioral disturbance (HCC)    Urinary incontinence    Fecal incontinence    Encounter for medication review    Syncope    Cortical age-related cataract of both eyes      Past Medical and Surgical History:     Past Medical History:   Diagnosis Date    Allergic     Lactose: Shellfish    Asthma     Dementia ([facility] )     Diabetes mellitus ([facility] )     Diverticulitis of colon     Glaucoma     Memory loss      Past Surgical History:   Procedure Laterality Date    EYE SURGERY      TESTICLE SURGERY        Family History:     Family History   Problem Relation Age of Onset    No Known Problems Mother     No Known Problems Father     No Known Problems Sister     No Known Problems Brother     No Known Problems Daughter     No Known Problems Sister     No Known Problems Sister     No Known Problems Brother     No Known Problems Brother     No Known Problems Brother     No Known Problems Brother     No Known Problems Daughter     No Known Problems Daughter     Colon cancer Neg Hx       Social History:     Social History     Socioeconomic History    Marital status:       Spouse name: None    Number of children: 3    Years of education: None    Highest education level: None   Occupational History    Occupation: retired    Tobacco Use    Smoking status: Never Smoker    Smokeless tobacco: Never Used   Vaping Use    Vaping Use: Never used   Substance and Sexual Activity    Alcohol use: Not Currently     Alcohol/week: 0 0 standard drinks    Drug use: Never    Sexual activity: Not Currently     Partners: Female     Birth control/protection: Female Sterilization   Other Topics Concern    None   Social History Narrative    None     Social Determinants of Health     Financial Resource Strain:     Difficulty of Paying Living Expenses:    Food Insecurity:     Worried About [facility] in the Last Year:     [facility] in the Last Year:    Transportation Needs:     Lack of Transportation (Medical):  Lack of Transportation (Non-Medical):    Physical Activity:     Days of Exercise per Week:     Minutes of Exercise per Session:    Stress:     Feeling of Stress :    Social Connections:     Frequency of Communication with Friends and Family:     Frequency of Social Gatherings with Friends and Family:     Attends [employer] Services:     Active Member of Clubs or Organizations:     Attends Club or Organization Meetings:     Marital Status:    Intimate Partner Violence:     Fear of Current or Ex-Partner:     Emotionally Abused:     Physically Abused:     Sexually Abused:       Medications and Allergies:     Current Outpatient Medications   Medication Sig Dispense Refill    aspirin (ECOTRIN LOW STRENGTH) 81 mg EC tablet Take 81 mg by mouth every morning      atorvastatin (LIPITOR) 20 mg tablet Take 1 tablet (20 mg total) by mouth daily 90 tablet 2    ergocalciferol (ERGOCALCIFEROL) 1 25 MG (20978 UT) capsule Take 50,000 Units by mouth every 30 (thirty) days Every 15th      levothyroxine 125 mcg tablet Take 125 mcg by mouth every morning      metFORMIN (GLUCOPHAGE-XR) 500 mg 24 hr tablet Take 500 mg by mouth every morning      senna (Senexon) 8 6 MG tablet Take 1 tablet by mouth as needed       tamsulosin (FLOMAX) 0 4 mg Take 1 capsule (0 4 mg total) by mouth daily with dinner 90 capsule 3     No current facility-administered medications for this visit       Allergies   Allergen Reactions    Lactose - Food Allergy     Shellfish-Derived Products - Food Allergy Diarrhea      Immunizations:     Immunization History   Administered Date(s) Administered    Influenza, high dose seasonal 0 7 mL 12/12/2019, 12/21/2020    Influenza, seasonal, injectable 12/08/2016    Pneumococcal Conjugate 13-Valent 12/08/2016, 12/19/2016    Pneumococcal Polysaccharide PPV23 12/19/2019    Tdap 11/28/2016      Health Maintenance: There are no preventive care reminders to display for this patient  There are no preventive care reminders to display for this patient  Medicare Health Risk Assessment:     /64   Pulse 63   Temp 97 7 °F (36 5 °C)   Ht 4' 8" (1 422 m)   Wt 56 7 kg (125 lb)   SpO2 97%   BMI 28 02 kg/m²      Joanne Diaz is here for his Subsequent Wellness visit  Health Risk Assessment:   Patient rates overall health as very good  Patient feels that their physical health rating is much better  Patient is very satisfied with their life  Eyesight was rated as same  Hearing was rated as same  Patient feels that their emotional and mental health rating is much better  Patients states they are never, rarely angry  Patient states they are never, rarely unusually tired/fatigued  Pain experienced in the last 7 days has been some  Patient's pain rating has been 5/10  Patient states that he has experienced no weight loss or gain in last 6 months  Fall Risk Screening: In the past year, patient has experienced: no history of falling in past year      Home Safety:  Patient has trouble with stairs inside or outside of their home  Patient has working smoke alarms and has working carbon monoxide detector  Home safety hazards include: none  Nutrition:   Current diet is Diabetic, Low Cholesterol, Low Saturated Fat, Low Carb and Other (please comment)  Fish, Chicken, Fruits, Vegetables, Nuts, Tea    Medications:   Patient is not currently taking any over-the-counter supplements  Patient is not able to manage medications  Activities of Daily Living (ADLs)/Instrumental Activities of Daily Living (IADLs):   Walk and transfer into and out of bed and chair?: Yes  Dress and groom yourself?: No    Bathe or shower yourself?: No    Feed yourself?  Yes  Do your laundry/housekeeping?: No  Manage your money, pay your bills and track your expenses?: No  Make your own meals?: No    Do your own shopping?: No    [facility]    Previous Hospitalizations:   Any hospitalizations or ED visits within the last 12 months?: Yes    How many hospitalizations have you had in the last year?: 1-2    Advance Care Planning:   Living will: No    Durable POA for healthcare: No    Advanced directive: No    Advanced directive counseling given: Yes      Comments: Pt with dementia  Family wishes for everything to be done (full code)  They would not want him maintained on machine if he is brain dead  Cognitive Screening:   Provider or family/friend/caregiver concerned regarding cognition?: Yes    PREVENTIVE SCREENINGS      Cardiovascular Screening:    General: Screening Not Indicated and History Lipid Disorder      Diabetes Screening:     General: Screening Not Indicated and History Diabetes      Colorectal Cancer Screening:     General: Screening Not Indicated      Prostate Cancer Screening:    General: Screening Not Indicated      Osteoporosis Screening:    General: Screening Not Indicated      Abdominal Aortic Aneurysm (AAA) Screening:        General: Screening Not Indicated      Lung Cancer Screening:     General: Screening Not Indicated      Hepatitis C Screening:    General: Screening Not Indicated    Screening, Brief Intervention, and Referral to Treatment (SBIRT)    Screening  Typical number of drinks in a day: 0  Typical number of drinks in a week: 0  Interpretation: Low risk drinking behavior      Single Item Drug Screening:  How often have you used an illegal drug (including marijuana) or a prescription medication for non-medical reasons in the past year? never    Single Item Drug Screen Score: 0  Interpretation: Negative screen for possible drug use disorder      Joesph Tavarez,

## 2021-06-29 NOTE — PATIENT INSTRUCTIONS

## 2021-06-29 NOTE — PROGRESS NOTES
Assessment/Plan:  1  Encounter for Medicare annual wellness exam  See other note     2  Type 2 diabetes mellitus without complication, without long-term current use of insulin (HCC)  Not well controlled - A1C last week 8 1 with  at [facility]   They increased metformin from 500 to 750 mg daily  If sugars not coming down daughter wishes to increase back to 500 mg twice a day   His diet is good   Reviewed goals for A1C at his age   Unable to provide urine to test for protein (incontient)     3  Mixed hyperlipidemia  Well controlled     4  Acquired hypothyroidism  Well controlled     5  Benign prostatic hyperplasia with weak urinary stream  No new concerns     6  Dementia without behavioral disturbance, unspecified dementia type ([facility] )  Stable   Doing well with senior life   Daughter getting a break now 2 times a week   She has good support from her BF too     7  Urinary incontinence, unspecified type  No changes     No follow-ups on file      Subjective:   Janet King is a 82 y o  male here today for a follow-up on his current medical conditions:  Patient Active Problem List   Diagnosis    Ambulatory dysfunction    Acquired hypothyroidism    History of CVA (cerebrovascular accident)    Type 2 diabetes mellitus without complication, without long-term current use of insulin [facility]    Mixed hyperlipidemia    Benign prostatic hyperplasia with weak urinary stream    Mild intermittent asthma without complication    Allergic reaction    Diverticulosis    Dementia without behavioral disturbance (HCC)    Urinary incontinence    Fecal incontinence    Encounter for medication review    Syncope    Cortical age-related cataract of both eyes        Current Outpatient Medications   Medication Sig Dispense Refill    aspirin (ECOTRIN LOW STRENGTH) 81 mg EC tablet Take 81 mg by mouth every morning      atorvastatin (LIPITOR) 20 mg tablet Take 1 tablet (20 mg total) by mouth daily 90 tablet 2    ergocalciferol (ERGOCALCIFEROL) 1 25 MG (24409 UT) capsule Take 50,000 Units by mouth every 30 (thirty) days Every 15th      levothyroxine 125 mcg tablet Take 125 mcg by mouth every morning      metFORMIN (GLUCOPHAGE-XR) 500 mg 24 hr tablet Take 500 mg by mouth every morning      senna (Senexon) 8 6 MG tablet Take 1 tablet by mouth as needed       tamsulosin (FLOMAX) 0 4 mg Take 1 capsule (0 4 mg total) by mouth daily with dinner 90 capsule 3     No current facility-administered medications for this visit  HPI:  Chief Complaint   Patient presents with   [facility] Wellness Visit     147     -- Above per clinical staff and reviewed  --    PHQ-9 Depression Screening    PHQ-9:   Frequency of the following problems over the past two weeks:             comes with dtr Isaac   HbA1C urine micro TSH   Feb 2021 labs CMP normal  TSH normal 1 30  HbA1C from 7 5 50 5 8 (120) 4  B12 & folate adequate   syncope thought to be due to low BP  ecommend outpatient echo  considered d/c to rehab  labs reviewed   Na 135  , otherwise normal    transferred to rehab   Today:  Seen today with daughter Angel Potter helping translate and with history   Daughter looking into other therapies like light therapy  - red light therapy looking at research   No problems with medications   Part of senior life program   Glucose levels have been going up - no procesed food, mediterranean diet, plant based   Last [facility]C 8 1 at [facility]   To be done in 3 months     Urine incontinence so protein not done   Cataract surgery on left eye  - did well with that   Still afraid of falling but this is better now     Happy     No advanced directive   Do everything     Great appetite     Going to [employer] [facility] twice a week and likes the socialization and the PT is helpful     The following portions of the patient's history were reviewed and updated as appropriate: allergies, current medications, past family history, past medical history, past social history, past surgical history and problem list     Objective:  Vitals:  /64   Pulse 63   Temp 97 7 °F (36 5 °C)   Ht 4' 8" (1 422 m)   Wt 56 7 kg (125 lb)   SpO2 97%   BMI 28 02 kg/m²    Wt Readings from Last 3 Encounters:   06/29/21 56 7 kg (125 lb)   04/27/21 57 6 kg (127 lb)   04/13/21 57 6 kg (127 lb)      BP Readings from Last 3 Encounters:   06/29/21 128/64   04/27/21 153/73   04/13/21 138/64        Review of Systems   He has no other concerns epr daughter - pt with dementia  No unexpected weight changes  No chest pain, SOB, or palpitations  No GERD  No changes in bowels or bladder  Sleeping well  No mood changes  Physical Exam   Constitutional:  he appears well-developed and well-nourished  HENT: Head: Normocephalic  Neck: Neck supple  Cardiovascular: Normal rate, regular rhythm and normal heart sounds  Pulmonary/Chest: Effort normal and breath sounds normal  No wheezes, rales, or rhonchi  Abdominal: Soft  Bowel sounds are normal  There is no tenderness  No hepatosplenomegaly  Musculoskeletal: he exhibits no edema  Lymphadenopathy: he has no cervical adenopathy  Neurological: he is alert  Skin: Skin is warm and dry  Psychiatric: he has a normal mood and affect   his behavior is normal  Thought content normal

## 2021-06-30 ENCOUNTER — TELEPHONE (OUTPATIENT)
Dept: FAMILY MEDICINE CLINIC | Facility: CLINIC | Age: 86
End: 2021-06-30

## 2021-06-30 NOTE — TELEPHONE ENCOUNTER
LM to call back to get this follow up scheduled    : Return in about 6 months (around 12/29/2021) for in person CC

## 2021-08-02 ENCOUNTER — TELEPHONE (OUTPATIENT)
Dept: INTERNAL MEDICINE CLINIC | Facility: CLINIC | Age: 86
End: 2021-08-02

## 2021-08-02 NOTE — TELEPHONE ENCOUNTER
[address]:  Department Phone: [phone]     Confirmed the last HbA1c for patient was 2/11/21 at 5 8  New order was placed for 6/26/21 but patient has not completed

## 2021-08-02 NOTE — TELEPHONE ENCOUNTER
He is up to date - can get next level after 6 months   (doctor at [facility] will likely order this)

## 2021-08-02 NOTE — TELEPHONE ENCOUNTER
----- Message from Fili Bullock DO sent at 7/1/2021  2:47 PM EDT -----  Please get HbA1C from his other provider at [facility]

## 2021-11-30 ENCOUNTER — TELEPHONE (OUTPATIENT)
Dept: FAMILY MEDICINE CLINIC | Facility: CLINIC | Age: 86
End: 2021-11-30

## 2021-12-03 ENCOUNTER — TELEPHONE (OUTPATIENT)
Dept: LAB | Facility: HOSPITAL | Age: 86
End: 2021-12-03

## 2021-12-06 ENCOUNTER — TELEMEDICINE (OUTPATIENT)
Dept: FAMILY MEDICINE CLINIC | Facility: CLINIC | Age: 86
End: 2021-12-06
Payer: MEDICARE

## 2021-12-06 VITALS
HEART RATE: 62 BPM | BODY MASS INDEX: 24.54 KG/M2 | SYSTOLIC BLOOD PRESSURE: 128 MMHG | WEIGHT: 125 LBS | HEIGHT: 60 IN | TEMPERATURE: 97.4 F | OXYGEN SATURATION: 97 % | DIASTOLIC BLOOD PRESSURE: 78 MMHG

## 2021-12-06 DIAGNOSIS — E78.2 MIXED HYPERLIPIDEMIA: ICD-10-CM

## 2021-12-06 DIAGNOSIS — E11.9 TYPE 2 DIABETES MELLITUS WITHOUT COMPLICATION, WITHOUT LONG-TERM CURRENT USE OF INSULIN (HCC): Primary | ICD-10-CM

## 2021-12-06 DIAGNOSIS — F03.90 DEMENTIA WITHOUT BEHAVIORAL DISTURBANCE, UNSPECIFIED DEMENTIA TYPE (HCC): ICD-10-CM

## 2021-12-06 DIAGNOSIS — Z86.73 HISTORY OF CVA (CEREBROVASCULAR ACCIDENT): ICD-10-CM

## 2021-12-06 DIAGNOSIS — E03.9 ACQUIRED HYPOTHYROIDISM: ICD-10-CM

## 2021-12-06 DIAGNOSIS — Z79.899 ENCOUNTER FOR MEDICATION REVIEW: ICD-10-CM

## 2021-12-06 PROCEDURE — 99214 OFFICE O/P EST MOD 30 MIN: CPT | Performed by: FAMILY MEDICINE

## 2021-12-14 ENCOUNTER — APPOINTMENT (OUTPATIENT)
Dept: LAB | Facility: HOSPITAL | Age: 86
End: 2021-12-14
Payer: MEDICARE

## 2021-12-14 DIAGNOSIS — E03.9 ACQUIRED HYPOTHYROIDISM: ICD-10-CM

## 2021-12-14 DIAGNOSIS — F03.90 DEMENTIA WITHOUT BEHAVIORAL DISTURBANCE, UNSPECIFIED DEMENTIA TYPE (HCC): ICD-10-CM

## 2021-12-14 DIAGNOSIS — E11.9 TYPE 2 DIABETES MELLITUS WITHOUT COMPLICATION, WITHOUT LONG-TERM CURRENT USE OF INSULIN (HCC): ICD-10-CM

## 2021-12-14 LAB
ALBUMIN SERPL BCP-MCNC: 3.6 G/DL (ref 3.5–5)
ALP SERPL-CCNC: 82 U/L (ref 46–116)
ALT SERPL W P-5'-P-CCNC: 24 U/L (ref 12–78)
ANION GAP SERPL CALCULATED.3IONS-SCNC: 6 MMOL/L (ref 4–13)
AST SERPL W P-5'-P-CCNC: 15 U/L (ref 5–45)
BASOPHILS # BLD AUTO: 0.06 THOUSANDS/ΜL (ref 0–0.1)
BASOPHILS NFR BLD AUTO: 1 % (ref 0–1)
BILIRUB SERPL-MCNC: 0.79 MG/DL (ref 0.2–1)
BUN SERPL-MCNC: 17 MG/DL (ref 5–25)
CALCIUM SERPL-MCNC: 10.1 MG/DL (ref 8.3–10.1)
CHLORIDE SERPL-SCNC: 102 MMOL/L (ref 100–108)
CO2 SERPL-SCNC: 29 MMOL/L (ref 21–32)
CREAT SERPL-MCNC: 0.77 MG/DL (ref 0.6–1.3)
EOSINOPHIL # BLD AUTO: 1.47 THOUSAND/ΜL (ref 0–0.61)
EOSINOPHIL NFR BLD AUTO: 18 % (ref 0–6)
ERYTHROCYTE [DISTWIDTH] IN BLOOD BY AUTOMATED COUNT: 13.3 % (ref 11.6–15.1)
EST. AVERAGE GLUCOSE BLD GHB EST-MCNC: 194 MG/DL
GFR SERPL CREATININE-BSD FRML MDRD: 83 ML/MIN/1.73SQ M
GLUCOSE SERPL-MCNC: 165 MG/DL (ref 65–140)
HBA1C MFR BLD: 8.4 %
HCT VFR BLD AUTO: 40.6 % (ref 36.5–49.3)
HGB BLD-MCNC: 13.2 G/DL (ref 12–17)
IMM GRANULOCYTES # BLD AUTO: 0.02 THOUSAND/UL (ref 0–0.2)
IMM GRANULOCYTES NFR BLD AUTO: 0 % (ref 0–2)
LYMPHOCYTES # BLD AUTO: 2.58 THOUSANDS/ΜL (ref 0.6–4.47)
LYMPHOCYTES NFR BLD AUTO: 32 % (ref 14–44)
MCH RBC QN AUTO: 31 PG (ref 26.8–34.3)
MCHC RBC AUTO-ENTMCNC: 32.5 G/DL (ref 31.4–37.4)
MCV RBC AUTO: 95 FL (ref 82–98)
MONOCYTES # BLD AUTO: 0.62 THOUSAND/ΜL (ref 0.17–1.22)
MONOCYTES NFR BLD AUTO: 8 % (ref 4–12)
NEUTROPHILS # BLD AUTO: 3.29 THOUSANDS/ΜL (ref 1.85–7.62)
NEUTS SEG NFR BLD AUTO: 41 % (ref 43–75)
NRBC BLD AUTO-RTO: 0 /100 WBCS
PLATELET # BLD AUTO: 274 THOUSANDS/UL (ref 149–390)
PMV BLD AUTO: 10.3 FL (ref 8.9–12.7)
POTASSIUM SERPL-SCNC: 4.1 MMOL/L (ref 3.5–5.3)
PROT SERPL-MCNC: 7.6 G/DL (ref 6.4–8.2)
RBC # BLD AUTO: 4.26 MILLION/UL (ref 3.88–5.62)
SODIUM SERPL-SCNC: 137 MMOL/L (ref 136–145)
TSH SERPL DL<=0.05 MIU/L-ACNC: 2.9 UIU/ML (ref 0.36–3.74)
VIT B12 SERPL-MCNC: 600 PG/ML (ref 100–900)
WBC # BLD AUTO: 8.04 THOUSAND/UL (ref 4.31–10.16)

## 2021-12-14 PROCEDURE — 36415 COLL VENOUS BLD VENIPUNCTURE: CPT

## 2021-12-14 PROCEDURE — 83036 HEMOGLOBIN GLYCOSYLATED A1C: CPT

## 2021-12-14 PROCEDURE — 84443 ASSAY THYROID STIM HORMONE: CPT

## 2021-12-14 PROCEDURE — 85025 COMPLETE CBC W/AUTO DIFF WBC: CPT

## 2021-12-14 PROCEDURE — 82607 VITAMIN B-12: CPT

## 2021-12-14 PROCEDURE — 80053 COMPREHEN METABOLIC PANEL: CPT

## 2022-05-02 ENCOUNTER — HOSPITAL ENCOUNTER (INPATIENT)
Facility: HOSPITAL | Age: 87
LOS: 2 days | Discharge: HOME HEALTH | DRG: 092 | End: 2022-05-05
Attending: EMERGENCY MEDICINE | Admitting: GENERAL PRACTICE
Payer: MEDICARE

## 2022-05-02 ENCOUNTER — APPOINTMENT (EMERGENCY)
Dept: CT IMAGING | Facility: HOSPITAL | Age: 87
DRG: 092 | End: 2022-05-02
Payer: MEDICARE

## 2022-05-02 ENCOUNTER — APPOINTMENT (EMERGENCY)
Dept: RADIOLOGY | Facility: HOSPITAL | Age: 87
DRG: 092 | End: 2022-05-02
Payer: MEDICARE

## 2022-05-02 DIAGNOSIS — E03.9 HYPOTHYROIDISM: ICD-10-CM

## 2022-05-02 DIAGNOSIS — E86.0 DEHYDRATION: ICD-10-CM

## 2022-05-02 DIAGNOSIS — F03.90 DEMENTIA WITHOUT BEHAVIORAL DISTURBANCE, UNSPECIFIED DEMENTIA TYPE (HCC): ICD-10-CM

## 2022-05-02 DIAGNOSIS — I10 PRIMARY HYPERTENSION: ICD-10-CM

## 2022-05-02 DIAGNOSIS — N39.0 UTI (URINARY TRACT INFECTION): ICD-10-CM

## 2022-05-02 DIAGNOSIS — R56.9 SEIZURE-LIKE ACTIVITY (HCC): Primary | ICD-10-CM

## 2022-05-02 LAB
ALBUMIN SERPL BCP-MCNC: 3.3 G/DL (ref 3.5–5)
ALP SERPL-CCNC: 81 U/L (ref 46–116)
ALT SERPL W P-5'-P-CCNC: 26 U/L (ref 12–78)
ANION GAP SERPL CALCULATED.3IONS-SCNC: 7 MMOL/L (ref 4–13)
APTT PPP: 27 SECONDS (ref 23–37)
AST SERPL W P-5'-P-CCNC: 18 U/L (ref 5–45)
BASOPHILS # BLD AUTO: 0.04 THOUSANDS/ΜL (ref 0–0.1)
BASOPHILS NFR BLD AUTO: 0 % (ref 0–1)
BILIRUB SERPL-MCNC: 0.37 MG/DL (ref 0.2–1)
BUN SERPL-MCNC: 22 MG/DL (ref 5–25)
CALCIUM ALBUM COR SERPL-MCNC: 9.2 MG/DL (ref 8.3–10.1)
CALCIUM SERPL-MCNC: 8.6 MG/DL (ref 8.3–10.1)
CARDIAC TROPONIN I PNL SERPL HS: 9 NG/L
CHLORIDE SERPL-SCNC: 108 MMOL/L (ref 100–108)
CO2 SERPL-SCNC: 30 MMOL/L (ref 21–32)
CREAT SERPL-MCNC: 0.97 MG/DL (ref 0.6–1.3)
EOSINOPHIL # BLD AUTO: 0.5 THOUSAND/ΜL (ref 0–0.61)
EOSINOPHIL NFR BLD AUTO: 5 % (ref 0–6)
ERYTHROCYTE [DISTWIDTH] IN BLOOD BY AUTOMATED COUNT: 13.6 % (ref 11.6–15.1)
GFR SERPL CREATININE-BSD FRML MDRD: 71 ML/MIN/1.73SQ M
GLUCOSE SERPL-MCNC: 235 MG/DL (ref 65–140)
HCT VFR BLD AUTO: 40.1 % (ref 36.5–49.3)
HGB BLD-MCNC: 12.7 G/DL (ref 12–17)
IMM GRANULOCYTES # BLD AUTO: 0.05 THOUSAND/UL (ref 0–0.2)
IMM GRANULOCYTES NFR BLD AUTO: 1 % (ref 0–2)
INR PPP: 1.15 (ref 0.84–1.19)
LYMPHOCYTES # BLD AUTO: 1.07 THOUSANDS/ΜL (ref 0.6–4.47)
LYMPHOCYTES NFR BLD AUTO: 11 % (ref 14–44)
MAGNESIUM SERPL-MCNC: 1.7 MG/DL (ref 1.6–2.6)
MCH RBC QN AUTO: 30.8 PG (ref 26.8–34.3)
MCHC RBC AUTO-ENTMCNC: 31.7 G/DL (ref 31.4–37.4)
MCV RBC AUTO: 97 FL (ref 82–98)
MONOCYTES # BLD AUTO: 0.07 THOUSAND/ΜL (ref 0.17–1.22)
MONOCYTES NFR BLD AUTO: 1 % (ref 4–12)
NEUTROPHILS # BLD AUTO: 8.16 THOUSANDS/ΜL (ref 1.85–7.62)
NEUTS SEG NFR BLD AUTO: 82 % (ref 43–75)
NRBC BLD AUTO-RTO: 0 /100 WBCS
PLATELET # BLD AUTO: 223 THOUSANDS/UL (ref 149–390)
PMV BLD AUTO: 10.1 FL (ref 8.9–12.7)
POTASSIUM SERPL-SCNC: 4 MMOL/L (ref 3.5–5.3)
PROT SERPL-MCNC: 7 G/DL (ref 6.4–8.2)
PROTHROMBIN TIME: 14.7 SECONDS (ref 11.6–14.5)
RBC # BLD AUTO: 4.13 MILLION/UL (ref 3.88–5.62)
SODIUM SERPL-SCNC: 145 MMOL/L (ref 136–145)
TSH SERPL DL<=0.05 MIU/L-ACNC: 0.2 UIU/ML (ref 0.45–4.5)
WBC # BLD AUTO: 9.89 THOUSAND/UL (ref 4.31–10.16)

## 2022-05-02 PROCEDURE — G1004 CDSM NDSC: HCPCS

## 2022-05-02 PROCEDURE — 83735 ASSAY OF MAGNESIUM: CPT | Performed by: EMERGENCY MEDICINE

## 2022-05-02 PROCEDURE — 99285 EMERGENCY DEPT VISIT HI MDM: CPT

## 2022-05-02 PROCEDURE — 85025 COMPLETE CBC W/AUTO DIFF WBC: CPT | Performed by: EMERGENCY MEDICINE

## 2022-05-02 PROCEDURE — 85730 THROMBOPLASTIN TIME PARTIAL: CPT | Performed by: EMERGENCY MEDICINE

## 2022-05-02 PROCEDURE — 84484 ASSAY OF TROPONIN QUANT: CPT | Performed by: EMERGENCY MEDICINE

## 2022-05-02 PROCEDURE — 84439 ASSAY OF FREE THYROXINE: CPT | Performed by: EMERGENCY MEDICINE

## 2022-05-02 PROCEDURE — 80053 COMPREHEN METABOLIC PANEL: CPT | Performed by: EMERGENCY MEDICINE

## 2022-05-02 PROCEDURE — 36415 COLL VENOUS BLD VENIPUNCTURE: CPT | Performed by: EMERGENCY MEDICINE

## 2022-05-02 PROCEDURE — 84443 ASSAY THYROID STIM HORMONE: CPT | Performed by: EMERGENCY MEDICINE

## 2022-05-02 PROCEDURE — 85610 PROTHROMBIN TIME: CPT | Performed by: EMERGENCY MEDICINE

## 2022-05-02 PROCEDURE — 70450 CT HEAD/BRAIN W/O DYE: CPT

## 2022-05-02 PROCEDURE — 71045 X-RAY EXAM CHEST 1 VIEW: CPT

## 2022-05-02 PROCEDURE — 93005 ELECTROCARDIOGRAM TRACING: CPT

## 2022-05-02 PROCEDURE — 99285 EMERGENCY DEPT VISIT HI MDM: CPT | Performed by: EMERGENCY MEDICINE

## 2022-05-03 PROBLEM — R25.1 TREMOR OF BOTH HANDS: Status: ACTIVE | Noted: 2022-05-03

## 2022-05-03 PROBLEM — R00.1 BRADYCARDIA: Status: ACTIVE | Noted: 2021-03-24

## 2022-05-03 PROBLEM — D72.829 LEUKOCYTOSIS: Status: ACTIVE | Noted: 2022-05-03

## 2022-05-03 PROBLEM — E86.0 DEHYDRATION: Status: ACTIVE | Noted: 2022-05-03

## 2022-05-03 PROBLEM — N40.0 BPH (BENIGN PROSTATIC HYPERPLASIA): Status: ACTIVE | Noted: 2022-05-03

## 2022-05-03 LAB
2HR DELTA HS TROPONIN: 0 NG/L
4HR DELTA HS TROPONIN: 0 NG/L
ALBUMIN SERPL BCP-MCNC: 2.9 G/DL (ref 3.5–5)
ALP SERPL-CCNC: 66 U/L (ref 46–116)
ALT SERPL W P-5'-P-CCNC: 28 U/L (ref 12–78)
ANION GAP SERPL CALCULATED.3IONS-SCNC: 6 MMOL/L (ref 4–13)
AST SERPL W P-5'-P-CCNC: 21 U/L (ref 5–45)
BACTERIA UR QL AUTO: ABNORMAL /HPF
BASOPHILS # BLD AUTO: 0.05 THOUSANDS/ΜL (ref 0–0.1)
BASOPHILS NFR BLD AUTO: 0 % (ref 0–1)
BILIRUB SERPL-MCNC: 0.64 MG/DL (ref 0.2–1)
BILIRUB UR QL STRIP: NEGATIVE
BUN SERPL-MCNC: 21 MG/DL (ref 5–25)
CALCIUM ALBUM COR SERPL-MCNC: 9.5 MG/DL (ref 8.3–10.1)
CALCIUM SERPL-MCNC: 8.6 MG/DL (ref 8.3–10.1)
CARDIAC TROPONIN I PNL SERPL HS: 9 NG/L
CARDIAC TROPONIN I PNL SERPL HS: 9 NG/L
CHLORIDE SERPL-SCNC: 111 MMOL/L (ref 100–108)
CLARITY UR: ABNORMAL
CO2 SERPL-SCNC: 28 MMOL/L (ref 21–32)
COLOR UR: YELLOW
CREAT SERPL-MCNC: 0.82 MG/DL (ref 0.6–1.3)
EOSINOPHIL # BLD AUTO: 0.1 THOUSAND/ΜL (ref 0–0.61)
EOSINOPHIL NFR BLD AUTO: 1 % (ref 0–6)
ERYTHROCYTE [DISTWIDTH] IN BLOOD BY AUTOMATED COUNT: 13.5 % (ref 11.6–15.1)
GFR SERPL CREATININE-BSD FRML MDRD: 81 ML/MIN/1.73SQ M
GLUCOSE P FAST SERPL-MCNC: 164 MG/DL (ref 65–99)
GLUCOSE SERPL-MCNC: 129 MG/DL (ref 65–140)
GLUCOSE SERPL-MCNC: 153 MG/DL (ref 65–140)
GLUCOSE SERPL-MCNC: 164 MG/DL (ref 65–140)
GLUCOSE SERPL-MCNC: 216 MG/DL (ref 65–140)
GLUCOSE UR STRIP-MCNC: ABNORMAL MG/DL
HCT VFR BLD AUTO: 35.6 % (ref 36.5–49.3)
HGB BLD-MCNC: 11.8 G/DL (ref 12–17)
HGB UR QL STRIP.AUTO: ABNORMAL
IMM GRANULOCYTES # BLD AUTO: 0.08 THOUSAND/UL (ref 0–0.2)
IMM GRANULOCYTES NFR BLD AUTO: 1 % (ref 0–2)
KETONES UR STRIP-MCNC: NEGATIVE MG/DL
LEUKOCYTE ESTERASE UR QL STRIP: ABNORMAL
LYMPHOCYTES # BLD AUTO: 1.09 THOUSANDS/ΜL (ref 0.6–4.47)
LYMPHOCYTES NFR BLD AUTO: 8 % (ref 14–44)
MAGNESIUM SERPL-MCNC: 1.7 MG/DL (ref 1.6–2.6)
MCH RBC QN AUTO: 31.6 PG (ref 26.8–34.3)
MCHC RBC AUTO-ENTMCNC: 33.1 G/DL (ref 31.4–37.4)
MCV RBC AUTO: 95 FL (ref 82–98)
MONOCYTES # BLD AUTO: 0.66 THOUSAND/ΜL (ref 0.17–1.22)
MONOCYTES NFR BLD AUTO: 5 % (ref 4–12)
NEUTROPHILS # BLD AUTO: 12.14 THOUSANDS/ΜL (ref 1.85–7.62)
NEUTS SEG NFR BLD AUTO: 85 % (ref 43–75)
NITRITE UR QL STRIP: POSITIVE
NON-SQ EPI CELLS URNS QL MICRO: ABNORMAL /HPF
NRBC BLD AUTO-RTO: 0 /100 WBCS
PH UR STRIP.AUTO: 6 [PH]
PHOSPHATE SERPL-MCNC: 2.7 MG/DL (ref 2.3–4.1)
PLATELET # BLD AUTO: 207 THOUSANDS/UL (ref 149–390)
PMV BLD AUTO: 9.6 FL (ref 8.9–12.7)
POTASSIUM SERPL-SCNC: 3.6 MMOL/L (ref 3.5–5.3)
PROT SERPL-MCNC: 6.3 G/DL (ref 6.4–8.2)
PROT UR STRIP-MCNC: ABNORMAL MG/DL
RBC # BLD AUTO: 3.73 MILLION/UL (ref 3.88–5.62)
RBC #/AREA URNS AUTO: ABNORMAL /HPF
SODIUM SERPL-SCNC: 145 MMOL/L (ref 136–145)
SP GR UR STRIP.AUTO: >=1.03 (ref 1–1.03)
T4 FREE SERPL-MCNC: 1.5 NG/DL (ref 0.76–1.46)
UROBILINOGEN UR QL STRIP.AUTO: 2 E.U./DL
WBC # BLD AUTO: 14.12 THOUSAND/UL (ref 4.31–10.16)
WBC #/AREA URNS AUTO: ABNORMAL /HPF

## 2022-05-03 PROCEDURE — 83735 ASSAY OF MAGNESIUM: CPT

## 2022-05-03 PROCEDURE — 97163 PT EVAL HIGH COMPLEX 45 MIN: CPT

## 2022-05-03 PROCEDURE — 85025 COMPLETE CBC W/AUTO DIFF WBC: CPT

## 2022-05-03 PROCEDURE — 36415 COLL VENOUS BLD VENIPUNCTURE: CPT | Performed by: EMERGENCY MEDICINE

## 2022-05-03 PROCEDURE — 97167 OT EVAL HIGH COMPLEX 60 MIN: CPT

## 2022-05-03 PROCEDURE — 80053 COMPREHEN METABOLIC PANEL: CPT

## 2022-05-03 PROCEDURE — 99214 OFFICE O/P EST MOD 30 MIN: CPT | Performed by: INTERNAL MEDICINE

## 2022-05-03 PROCEDURE — 87086 URINE CULTURE/COLONY COUNT: CPT

## 2022-05-03 PROCEDURE — 99223 1ST HOSP IP/OBS HIGH 75: CPT | Performed by: GENERAL PRACTICE

## 2022-05-03 PROCEDURE — 92610 EVALUATE SWALLOWING FUNCTION: CPT

## 2022-05-03 PROCEDURE — 99254 IP/OBS CNSLTJ NEW/EST MOD 60: CPT | Performed by: PSYCHIATRY & NEUROLOGY

## 2022-05-03 PROCEDURE — 84100 ASSAY OF PHOSPHORUS: CPT

## 2022-05-03 PROCEDURE — 87186 SC STD MICRODIL/AGAR DIL: CPT

## 2022-05-03 PROCEDURE — 84484 ASSAY OF TROPONIN QUANT: CPT | Performed by: EMERGENCY MEDICINE

## 2022-05-03 PROCEDURE — 87077 CULTURE AEROBIC IDENTIFY: CPT

## 2022-05-03 PROCEDURE — 81001 URINALYSIS AUTO W/SCOPE: CPT

## 2022-05-03 PROCEDURE — 82948 REAGENT STRIP/BLOOD GLUCOSE: CPT

## 2022-05-03 RX ORDER — POLYETHYLENE GLYCOL 3350 17 G/17G
17 POWDER, FOR SOLUTION ORAL DAILY
Status: DISCONTINUED | OUTPATIENT
Start: 2022-05-03 | End: 2022-05-05 | Stop reason: HOSPADM

## 2022-05-03 RX ORDER — INSULIN LISPRO 100 [IU]/ML
1-5 INJECTION, SOLUTION INTRAVENOUS; SUBCUTANEOUS
Status: DISCONTINUED | OUTPATIENT
Start: 2022-05-03 | End: 2022-05-05 | Stop reason: HOSPADM

## 2022-05-03 RX ORDER — HEPARIN SODIUM 5000 [USP'U]/ML
5000 INJECTION, SOLUTION INTRAVENOUS; SUBCUTANEOUS EVERY 8 HOURS SCHEDULED
Status: DISCONTINUED | OUTPATIENT
Start: 2022-05-03 | End: 2022-05-05 | Stop reason: HOSPADM

## 2022-05-03 RX ORDER — ACETAMINOPHEN 325 MG/1
650 TABLET ORAL EVERY 6 HOURS PRN
Status: DISCONTINUED | OUTPATIENT
Start: 2022-05-03 | End: 2022-05-05 | Stop reason: HOSPADM

## 2022-05-03 RX ORDER — LEVOTHYROXINE SODIUM 112 UG/1
112 TABLET ORAL EVERY MORNING
Status: DISCONTINUED | OUTPATIENT
Start: 2022-05-04 | End: 2022-05-05 | Stop reason: HOSPADM

## 2022-05-03 RX ORDER — ATORVASTATIN CALCIUM 20 MG/1
20 TABLET, FILM COATED ORAL DAILY
Status: DISCONTINUED | OUTPATIENT
Start: 2022-05-03 | End: 2022-05-05 | Stop reason: HOSPADM

## 2022-05-03 RX ORDER — SODIUM CHLORIDE, SODIUM GLUCONATE, SODIUM ACETATE, POTASSIUM CHLORIDE, MAGNESIUM CHLORIDE, SODIUM PHOSPHATE, DIBASIC, AND POTASSIUM PHOSPHATE .53; .5; .37; .037; .03; .012; .00082 G/100ML; G/100ML; G/100ML; G/100ML; G/100ML; G/100ML; G/100ML
100 INJECTION, SOLUTION INTRAVENOUS CONTINUOUS
Status: DISCONTINUED | OUTPATIENT
Start: 2022-05-03 | End: 2022-05-05 | Stop reason: HOSPADM

## 2022-05-03 RX ORDER — ONDANSETRON 2 MG/ML
4 INJECTION INTRAMUSCULAR; INTRAVENOUS EVERY 6 HOURS PRN
Status: DISCONTINUED | OUTPATIENT
Start: 2022-05-03 | End: 2022-05-05 | Stop reason: HOSPADM

## 2022-05-03 RX ORDER — TAMSULOSIN HYDROCHLORIDE 0.4 MG/1
0.4 CAPSULE ORAL
Status: DISCONTINUED | OUTPATIENT
Start: 2022-05-03 | End: 2022-05-05 | Stop reason: HOSPADM

## 2022-05-03 RX ORDER — CALCIUM CARBONATE 200(500)MG
1000 TABLET,CHEWABLE ORAL DAILY PRN
Status: DISCONTINUED | OUTPATIENT
Start: 2022-05-03 | End: 2022-05-05 | Stop reason: HOSPADM

## 2022-05-03 RX ORDER — LORAZEPAM 2 MG/ML
0.5 INJECTION INTRAMUSCULAR ONCE AS NEEDED
Status: DISCONTINUED | OUTPATIENT
Start: 2022-05-03 | End: 2022-05-05 | Stop reason: HOSPADM

## 2022-05-03 RX ORDER — ASPIRIN 81 MG/1
81 TABLET ORAL EVERY MORNING
Status: DISCONTINUED | OUTPATIENT
Start: 2022-05-03 | End: 2022-05-05 | Stop reason: HOSPADM

## 2022-05-03 RX ORDER — LEVOTHYROXINE SODIUM 0.12 MG/1
125 TABLET ORAL EVERY MORNING
Status: DISCONTINUED | OUTPATIENT
Start: 2022-05-03 | End: 2022-05-03

## 2022-05-03 RX ADMIN — SODIUM CHLORIDE, SODIUM GLUCONATE, SODIUM ACETATE, POTASSIUM CHLORIDE, MAGNESIUM CHLORIDE, SODIUM PHOSPHATE, DIBASIC, AND POTASSIUM PHOSPHATE 100 ML/HR: .53; .5; .37; .037; .03; .012; .00082 INJECTION, SOLUTION INTRAVENOUS at 01:01

## 2022-05-03 RX ADMIN — TAMSULOSIN HYDROCHLORIDE 0.4 MG: 0.4 CAPSULE ORAL at 16:59

## 2022-05-03 RX ADMIN — INSULIN LISPRO 1 UNITS: 100 INJECTION, SOLUTION INTRAVENOUS; SUBCUTANEOUS at 16:59

## 2022-05-03 RX ADMIN — HEPARIN SODIUM 5000 UNITS: 5000 INJECTION INTRAVENOUS; SUBCUTANEOUS at 15:36

## 2022-05-03 RX ADMIN — HEPARIN SODIUM 5000 UNITS: 5000 INJECTION INTRAVENOUS; SUBCUTANEOUS at 05:49

## 2022-05-03 RX ADMIN — HEPARIN SODIUM 5000 UNITS: 5000 INJECTION INTRAVENOUS; SUBCUTANEOUS at 21:53

## 2022-05-03 NOTE — PLAN OF CARE
Problem: PHYSICAL THERAPY ADULT  Goal: Performs mobility at highest level of function for planned discharge setting  See evaluation for individualized goals  Description: Treatment/Interventions: Functional transfer training,LE strengthening/ROM,Therapeutic exercise,Endurance training,Patient/family training,Equipment eval/education,Bed mobility,Gait training  Equipment Recommended: Hector Landa       See flowsheet documentation for full assessment, interventions and recommendations  5/3/2022 1412 by Dylon López, PT  Note: Prognosis: Fair  Problem List: Decreased strength,Decreased endurance,Impaired balance,Decreased mobility,Decreased cognition  Assessment: Jalen Lopez is a 82 y o  Male who [facility] on 5/2/2022 from home due to witnessed seizure and diagnosis of tremor of both hands  Orders for PT eval and treat received, w/ activity orders of up w/ assist and seizure precautions  Comorbidities affecting pt at time of evaluation include: dementia, asthma, DM, CVA  Personal factors affecting DC include: ambulating w/ assistive device, inability to ambulate household distances, inability to navigate level surfaces w/o external assistance, decreased cognition, inability to perform IADLs and inability to perform ADLs  Per pt's daughter, at baseline pt is ambulatory w/ RW and close supervision w/o recent falls  Upon evaluation, pt presents w/ the following deficits: weakness, impaired balance, decreased endurance and limited tolerance to functional mobility  Pt currently requires max Ax2 for bed mobility, mod Ax1 for transfers, w/ pt unable to ambulate at this time  Pt's clinical presentation is unstable/unpredictable due to need for assist w/ all phases of mobility, need for input for mobility technique, ongoing medical monitoring/management, and recent drastic decline in mobility compared to baseline   Pt is at an increased risk of falls due to impaired cognition, impaired balance, and requirement of Ax1-2 for all functional mobility  From a PT/mobility standpoint, given the above findings, DC recommendation is for Home w/ HHPT w/ baseline level of assistance vs post acute rehab  During this admission, pt would benefit from continued skilled inpatient PT in the acute care setting in order to address the above deficits to maximize function and mobility before DC from acute care  PT Discharge Recommendation: Home with home health rehabilitation (w/ baseline level of family support vs post acute rehab)          See flowsheet documentation for full assessment

## 2022-05-03 NOTE — PLAN OF CARE
Pt arrived on the floor via stretcher  Pt is awake, A&O x 1 (person only)  Pt speaks and understands Mohawk language only  Transferred to the bed with assist x 3  Tolerated well  B/l lower and upper extremities stiffness noted  B/L upper extremities tremor noted  Pt's daughter at bed site  Will continue to monitor

## 2022-05-03 NOTE — ASSESSMENT & PLAN NOTE
Pt with history of dependence on assistance for walking  Daughter reports pt walks at home with walker and assistance, however mainly sits in wheelchair; she denies acute change  Pt should only be out of bed with assistance   PT/OT evaluation

## 2022-05-03 NOTE — PLAN OF CARE
Pt presents with mild pharyngeal dysphagia characterized by overt s/s of aspiration with thin liquids when consumed by cup and straw  Pt with intermittent dry & wet cough s/p swallow  Pt consumed trials of NTL via straw and cup, puree, soft, and regular solids without change in vocal quality, cough s/p swallow, or change in vital signs  Recommend dysphagia 3/soft with NTL  ST to follow

## 2022-05-03 NOTE — ASSESSMENT & PLAN NOTE
Pt with history of dependence on assistance for walking  Daughter reports pt walks at home with walker and assistance, however mainly sits in wheelchair; she denies acute change    Plan:  · Fall precautions  · Pt should only be out of bed with assistance  · PT/OT evaluation

## 2022-05-03 NOTE — ED NOTES
Offered patient breakfast, pt declined and said he was not hungry       Sarah Mckinney, KIKE  05/03/22 7732

## 2022-05-03 NOTE — UTILIZATION REVIEW
Observation Admission Authorization Request   NOTIFICATION OF OBSERVATION ADMISSION/OBSERVATION AUTHORIZATION REQUEST   SERVICING FACILITY:   [address]  Tax ID: [id number]  NPI: [id number]  Place of Service: On [address] [facility] Code: 22  CPT Code for Observation: CPT   CPT 42202     ATTENDING PROVIDER:  Attending Name and NPI#: Arabella Ramirez [[id number]]  Address: [address]  Phone: [phone]     UTILIZATION REVIEW CONTACT:  Leann Brantley Utilization   Network Utilization Review Department  Phone: [phone]  Fax: [phone]  Email: [email or web address]     PHYSICIAN ADVISORY SERVICES:  FOR [id number] REVIEW - MEDICAL NECESSITY DENIAL  Phone: [phone]  Fax: [phone]  Email: [email or web address]     TYPE OF REQUEST:  Observation  Status     ADMISSION INFORMATION:  ADMISSION DATE/TIME:   PATIENT DIAGNOSIS CODE/DESCRIPTION:  Seizure ([address] ) [R56 [id number]]  DISCHARGE DATE/TIME: No discharge date for patient encounter  IMPORTANT INFORMATION:  Please contact the Leann Brantley directly with any questions or concerns regarding this request  Department voicemails are confidential     Send requests for admission clinical reviews, concurrent reviews, approvals, and administrative denials due to lack of clinical to fax [phone]

## 2022-05-03 NOTE — ASSESSMENT & PLAN NOTE
POA: Pt's daughter states that she witnessed her father take a sip of juice at the dinner table and then watched his hands start shaking and his arms began moving back and forth uncontrollably which lasted for a few minutes then resolved  Pt's daughter states that pt has baseline tremor but never that severe  CT head without contrast, Result Date: 5/2/2022  Impression: Images degraded by motion artifact  No acute intracranial abnormality identified within the limitations of the examination  Pansinusitis again noted  Volume loss with unchanged ventriculomegaly, which could suggest mild superimposed hydrocephalus  Correlate for any clinical evidence of normal pressure hydrocephalus     Magnesium 1 7  TSH low, elevated Free T4    5/3:  CBC shows leukocytosis, anemia  Exam shows low amplitude resting tremor in UEs bilaterally  Suspicion for worsening baseline tremor d/t supratherapeutic levothyroxine dose, deconditioning, and/or possible infection, Need to r/o seizure and stroke      Plan:   Follow neuro recommendations:  · EEG to evaluate for seizure activity  · Hold MRI brain, patient may not tolerate d/t excess movement  · Decrease levothyroxine dose to 112mg QD, repeat TSH in 4-6 weeks

## 2022-05-03 NOTE — ED NOTES
Linens were changed and patient was changed r/t being soiled  Patient was boosted and lights were dimmed for comfort  Patient shows no signs of distress noted at this time  Will continue to monitor        [address] [place] [address]  05/03/22 0404

## 2022-05-03 NOTE — PHYSICAL THERAPY NOTE
PHYSICAL THERAPY EVALUATION NOTE          Patient Name: Olena العراقي  GUVHP'B Date: 5/3/2022       AGE:   82 y o  Mrn:   [id number]  ADMIT DX:  Seizure ([facility] ) [R56 9]    Past Medical History:   Diagnosis Date    Allergic     Lactose: Shellfish    Asthma     Dementia ([facility] )     Diabetes mellitus ([place] [address] )     Diverticulitis of colon     Glaucoma     Memory loss      Length Of Stay: 0  PHYSICAL THERAPY EVALUATION :   Patient's identity confirmed via 2 patient identifiers (full name and ) at start of session       22 1325   PT Last Visit   PT Visit Date 22   Note Type   Note type Evaluation   Pain Assessment   Pain Assessment Tool FLACC   Pain Rating: FLACC (Rest) - Face 0   Pain Rating: FLACC (Rest) - Legs 0   Pain Rating: FLACC (Rest) - Activity 0   Pain Rating: FLACC (Rest) - Cry 0   Pain Rating: FLACC (Rest) - Consolability 0   Score: FLACC (Rest) 0   Pain Rating: FLACC (Activity) - Face 1   Pain Rating: FLACC (Activity) - Legs 1   Pain Rating: FLACC (Activity) - Activity 0   Pain Rating: FLACC (Activity) - Cry 0   Pain Rating: FLACC (Activity) - Consolability 1   Score: FLACC (Activity) 3   Restrictions/Precautions   Weight Bearing Precautions Per Order No   Other Precautions Cognitive;Multiple lines;Telemetry; Fall Risk;Seizure  (language barrier)   Home Living   Type of [address] [place] [address] Two level;Bed/bath upstairs; Performs ADLs on one level; Able to live on main level with bedroom/bathroom;Stairs to enter with rails  (7 [address]; full flight to 2nd floor)   Bathroom Shower/Tub   (pt sponge-bathes w/ assistance)   Bathroom Equipment Tub transfer bench   Bathroom Accessibility   (pt does not use bathroom)   Home Equipment Walker; Wheelchair-manual  (recliner lift chair)   Additional Comments Pt unable to provide home set-up/PLOF due to baseline dementia and language barrier, information obtained from pt's daughter who was present throughout, lives w/ pt, and is pt's primary caregiver  Pt resides w/ daughter in a two level house w/ 7 ONESIMO and bedroom/bathroom on 2nd floor w/ a full flight of stairs to access  Per pt's daughter, pt remains on 2nd floor of house, does not perform stairs, w/ pt requiring BLS transport in/out of the house at baseline   Prior Function   Level of [place] Needs assistance with ADLs and functional mobility; Needs assistance with IADLs   Lives With Daughter   Receives Help From Family;Personal care attendant  (PCA through [employer] to assist w/ bathing)   ADL Assistance Needs assistance   IADLs Needs assistance   Falls in the last 6 months 0  (pt's daughter declines recent falls)   Comments Pt daughter at baseline, pt is ambulatory ("all around the house") w/ RW w/ close supervision, dependent w/ WC mobility  However, reports pt's level of mobility and required assistance fluctuates day-to-day   General   Family/Caregiver Present Yes  (pt's daughter present and provided home set-up and pt's PLOF)   Cognition   Overall Cognitive Status Impaired   Arousal/Participation Cooperative   Orientation Level Oriented to person  (responds to name)   Memory Unable to assess   Following Commands Follows one step commands with increased time or repetition   Comments Pt ID via name and ; pt pleasant throughout, mainly non-verbal    Subjective   Subjective "bye"   Strength RLE   RLE Overall Strength 3-/5  (grossly assessed w/ functional mobility)   Strength LLE   LLE Overall Strength 3-/5  (grossly assessed w/ functional mobility)   Bed Mobility   Supine to Sit 2  Maximal assistance   Additional items Assist x 2; Increased time required;Verbal cues;LE management  (pt actively resisting mobility)   Sit to Supine 2  Maximal assistance   Additional items Assist x 2; Increased time required;Verbal cues;LE management   Additional Comments Pt initially resisting supine>sit transfer   Pt more cooperative w/ daughter w/in line of sight and providing encouragement  Pt able to maintain static sitting balance at EOB w/ close supervision  Increased assistance required for sit>supine mobility due to pt's height and height of ED stretcher   Transfers   Sit to Stand 3  Moderate assistance   Additional items Assist x 1; Increased time required;Verbal cues  (BUE support on RW)   Stand to Sit 3  Moderate assistance   Additional items Assist x 1; Increased time required;Verbal cues  (w/ BUE on RW)   Additional Comments Pt performed 2 STS transfer trials w/ Mod Ax1 and BUE support on RW, unable to initiate ambulation at this time   Ambulation/Elevation   Gait pattern Not tested  (pt unable to ambulate at this time)   Balance   Static Sitting Fair -   Dynamic Sitting Poor +   Static Standing Poor   Dynamic Standing Poor   Ambulatory   (unable to assess)   Activity Tolerance   Activity Tolerance Patient limited by fatigue  (pt limited by cognition)   Medical Staff Made Aware Care coordination w/ OT Lexee due to pt's medical complexity, regression from mobility baseline, and cognitive/safety awareness deficits requiring two skilled clinicians to perform evaluation; individual items assessed and goals set   Nurse Made Aware KIKE Baires   Assessment   Prognosis Fair   Problem List Decreased strength;Decreased endurance; Impaired balance;Decreased mobility; Decreased cognition   Assessment Arti Harris is a 82 y o  Male who [facility] on 5/2/2022 from home due to witnessed seizure and diagnosis of tremor of both hands  Orders for PT eval and treat received, w/ activity orders of up w/ assist and seizure precautions  Comorbidities affecting pt at time of evaluation include: dementia, asthma, DM, CVA  Personal factors affecting DC include: ambulating w/ assistive device, inability to ambulate household distances, inability to navigate level surfaces w/o external assistance, decreased cognition, inability to perform IADLs and inability to perform ADLs   Per pt's daughter, at baseline pt is ambulatory w/ RW and close supervision w/o recent falls  Upon evaluation, pt presents w/ the following deficits: weakness, impaired balance, decreased endurance and limited tolerance to functional mobility  Pt currently requires max Ax2 for bed mobility, mod Ax1 for transfers, w/ pt unable to ambulate at this time  Pt's clinical presentation is unstable/unpredictable due to need for assist w/ all phases of mobility, need for input for mobility technique, ongoing medical monitoring/management, and recent drastic decline in mobility compared to baseline  Pt is at an increased risk of falls due to impaired cognition, impaired balance, and requirement of Ax1-2 for all functional mobility  From a PT/mobility standpoint, given the above findings, DC recommendation is for Home w/ HHPT w/ baseline level of assistance vs post acute rehab  During this admission, pt would benefit from continued skilled inpatient PT in the acute care setting in order to address the above deficits to maximize function and mobility before DC from acute care  Goals   Patient Goals pt did not provide, pt's lauraugher wishes for pt to return home   STG Expiration Date 05/13/22   Short Term Goal #1 Pt will: perform bilateral rolling bed mobility w/ min Ax1 to decrease burden of care; perform supine<>sit mobility w/ min Ax1 to increase pt's functional mobility; sit at EOB w/ supervision for at least 25 minutes to increase pt's tolerance to an upright sitting position; perform transfers w/ min Ax1 to increase pt's OOB mobility; ambulate at least 50' w/ RW and min Ax1 to increase pt's ambulatory endurance; increase all balance ratings by at least 1 grade to decrease pt's risk of falls   PT Treatment Day 0   Plan   Treatment/Interventions Functional transfer training;LE strengthening/ROM; Therapeutic exercise; Endurance training;Patient/family training;Equipment eval/education; Bed mobility;Gait training   PT Frequency 2-3x/wk Recommendation   PT Discharge Recommendation Home with home health rehabilitation  (w/ baseline level of family support vs post acute rehab)   Equipment Recommended Sissy walker   Change/add to ZhongSou? Yes, Change Size   Walker Size David (Ht <5'1")   AM-PAC Basic Mobility Inpatient   Turning in Bed Without Bedrails 2   Lying on Back to Sitting on Edge of Flat Bed 1   Moving Bed to Chair 2   Standing Up From Chair 2   Walk in Room 1   Climb 3-5 Stairs 1   Basic Mobility Inpatient Raw Score 9   Highest Level Of Mobility   JH-HLM Goal 3: Sit at edge of bed   JH-HLM Highest Level of Mobility 3: Sit at edge of bed   JH-HLM Goal Achieved Yes   End of Consult   Patient Position at End of Consult Supine; All needs within reach  [facility] elevated on ED stretcher)       The patient's AM-PAC Basic Mobility Inpatient Short Form Raw Score is 9  A Raw score of less than or equal to 16 suggests the patient may benefit from discharge to post-acute rehabilitation services  Please also refer to the recommendation of the Physical Therapist for safe discharge planning      Pt would benefit from skilled inpatient PT during this admission in order to facilitate progress towards goals to maximize functional independence    DC rec: HHPT w/ baseline level of assistance vs post acute rehab      Jacob Singh, PT, DPT  05/03/22

## 2022-05-03 NOTE — ASSESSMENT & PLAN NOTE
Longstanding history of dementia  5/3 - Pt AOx0  Patient at risk for delirium given age and co-morbidities    Plan:  -Recommend global delirium precautions as follows:   -Establishment of day/night cycle via lights during the day and blinds open    -Please limit interruptions at night as medically appropriate   -Patient should be out of bed during the day as tolerated or medically indicated   -Provide glasses/hearing aids as appropriate   -Minimize deliriogenic meds (including, but not limited to, tramadol, benzodiazepines, anticholinergics, Benadryl)   -Provide reorientation including date on board and visible clock   -Treat pain as indicated via geriatric pain protocol  -Monitor for constipation and urinary retention   -Avoid restraints as able, frequent verbal reorientations or patient care sitter as appropriate   -Consider use of melatonin qHS for circadian rhythm maintenance  Fall precautions  Bedside swallow evaluation, aspiration precautions

## 2022-05-03 NOTE — ASSESSMENT & PLAN NOTE
-Patient at risk for delirium given age and co-morbidities  -Recommend global delirium precautions as follows:   -Establishment of day/night cycle via lights during the day and blinds open    -Please limit interruptions at night as medically appropriate   -Patient should be out of bed during the day as tolerated or medically indicated   -Provide glasses/hearing aids as appropriate   -Minimize deliriogenic meds (including, but not limited to, tramadol, benzodiazepines, anticholinergics, Benadryl)   -Provide reorientation including date on board and visible clock   -Treat pain as indicated via geriatric pain protocol  -Monitor for constipation and urinary retention   -Avoid restraints as able, frequent verbal reorientations or patient care sitter as appropriate   -Consider use of melatonin qHS for circadian rhythm maintenance  Fall precautions  Bedside swallow evaluation, aspiration precautions

## 2022-05-03 NOTE — ASSESSMENT & PLAN NOTE
Patient is an 82-year-old male with history of dementia, diabetes, hypothyroidism hyperlipidemia and CVA who presents with seizure-like activity  Daughter describes a transient episode of bilateral upper extremity flapping tremor  No evidence of a tonic phase preceding this  No report of tongue bite or urinary incontinence  No postictal state  Patient does have a poor baseline with minimal orientation and awareness  He is dependent on ADLs and uses diapers  Daughter states that he has increased tone at baseline  On arrival to the ED /97  CTH wo any acute intracranial abnormality  Unchanged ventriculomegaly  Possible superimposed hydrocephalus  TSH 0 200 with T4 1 50  WBC initially 9 89 and up trended to 14  12  Physical exam at baseline  Impression:  Likely deconditioning  Possible tremor exacerbated metabolic disturbances including supratherapeutic levothyroxine and UTI    Seizure and stroke lower on the differential     Plan:  · Routine EEG pending  · Hold MRI for the moment, patient may not be able to tolerate given mental status  · Adjust levothyroxine and monitor TSH  · Downtrending WBC  · U/A positive for bacteria  · Continue aspirin and statin  · Appreciate geriatric medicine consult  · Management of metabolic disturbances by primary team  · Delirium precautions  · Call or notify Neurology if any recurrent seizure-like activity

## 2022-05-03 NOTE — ASSESSMENT & PLAN NOTE
POA: Pt had episode of bradycardia to 46bpm in ED triage   First EKG remarkable for bigeminy and was limited by artifact; follow up EKG was sinus rhythm with regular rate also limited by artifact  Telemetry reviewed and unremarkable, discontinued

## 2022-05-03 NOTE — ASSESSMENT & PLAN NOTE
POA: Pt's daughter states that she witnessed her father take a sip of juice at the dinner table and then watched his hands start shaking and his arms began moving back and forth uncontrollably which lasted for a few minutes then resolved  Pt's daughter states that pt has baseline tremor but never that severe  CT head without contrast, Result Date: 5/2/2022  Impression: Images degraded by motion artifact  No acute intracranial abnormality identified within the limitations of the examination  Pansinusitis again noted  Volume loss with unchanged ventriculomegaly, which could suggest mild superimposed hydrocephalus  Correlate for any clinical evidence of normal pressure hydrocephalus  Magnesium 1 7  CBC unremarkable, CMP no electrolyte abnormalities  TSH low, therapeutic dose of synthroid    Pt could have had hypoglycemia, seizure-like activity, worsening of baseline essential tremor, near syncopal episode, CVA, TIA, arrhythmia event, cardiac event, NPH       Plan:   EEG to evaluate for seizure activity  Neurology consult  MRI brain for further evaluation of findings on CT  Check troponins   Orthostatic vitals  CBC and BMP in AM

## 2022-05-03 NOTE — ASSESSMENT & PLAN NOTE
Lab Results   Component Value Date    HGBA1C 8 4 (H) 12/14/2021       Recent Labs     05/03/22  0718   POCGLU 153*       Blood Sugar Average: Last 72 hrs:  · Pt takes metformin at home, hold home med  · SSI while inpatient  · Accuchecks

## 2022-05-03 NOTE — UTILIZATION REVIEW
Initial Clinical Review  OBSERVATION ADMISSION 5/2/2022 2241 CONVERTED TO INPATIENT ADMISSION 5/3/2022 1305 DUE TO ONGOING MONITORING/ WORKUP  IN A PATIENT W BASELINE ALZHEIMER'S DEMENTIA W/ VIOLENT SHAKING OF HANDS REQ ONGOING TESTING (EEG, CORRECTION OF ANY METABOLIC IMBALANCES ( PER NEURO CONSULT)   Admission: Date/Time/Statement:   Admission Orders (From admission, onward)     Ordered        05/03/22 1305  Inpatient Admission  Once            05/02/22 2241  Place in Observation  Once                      Orders Placed This Encounter   Procedures    Inpatient Admission     Standing Status:   Standing     Number of Occurrences:   1     Order Specific Question:   Level of Care     Answer:   Med Surg [16]     Order Specific Question:   Estimated length of stay     Answer:   More than 2 Midnights     Order Specific Question:   Certification     Answer:   I certify that inpatient services are medically necessary for this patient for a duration of greater than two midnights  See H&P and MD Progress Notes for additional information about the patient's course of treatment  ED Arrival Information     Expected Arrival Acuity    - 5/2/2022 20:33 Urgent         Means of arrival Escorted by Service Admission type    Ambulance [facility] Urgent         Arrival complaint    seizure        Chief Complaint   Patient presents with    Seizure - New Onset     pt arrives via EMS following a 1 minute seizure witnessed by bianca while in wheelchair  No fall, no headstrike, no LOC  No postictal state according to EMS       Initial Presentation: 82 y o  male [place] speaking urgently to ED as Observation admission for evaluation & treatment of shaking of both hands, Bradycardia, dehydration, dementia    PMH   Advanced dementia, minimally verbal at baseline, wears diapers due to incontinence,  DM type 2, CVA, HLD, ambulatory dysfunction presents w abnormal event described by daughter (caregiver)   At dinner patient sipped juice w  uncontrollable shaking of hands w a "spaced out " appearance unaware of violent shaking  Able to respond to daughters through out event at dinner  EXAM   Bradycardia w/ 1st EKG remarkable for bigeminy and was limited by artifact; follow up EKG was sinus rhythm with regular rate also limited by artifact  CT reveals Volume loss with dilated ventricles and diminished cerebral volume and suspected NPH  Tremor BL arms & hands w intention  Inattentive  GCS 4/5/6=15    PLAN  EEG, consult Neurology, seizure precautions  Trops, orthostatic, repeat CBC/BMP in am , tele, monitor vitals & symptoms  Ambulatory POX  Limit interruptions at night as medically possible, reorient , treat pain via geriatric pain protocol  Fall precautions & RN bedside swallow eval , aspiration precautions, SSI, PT/OT , consult Geriatric medicine  Date:  5/3/2022   Day 2: CHANGED TO INPATIENT   Neurology  Baseline dementia w suspect seizure like event req further DX(EEG) , correction of any metabolic imbalance- w low TSH on levothyroxine , adjust dose  If EEG abn consider MRI  Straight cath for UA to rule out UTI  Delirium precautions  Likely deconditioning  Geriatric medicine  Higher risk of dev delirium due to Alzheimer's; maintain delirium precautions, based on A1c rec increased Metformin to 1000 mg BID, Decrease Levothyroxine to 112mcg due to hyperthyroid labs (TSH& T4)  PT/OT w walker w front wheels for ambulation  Noted for leukocytosis- monitor CBCD     Attending  Noted w weakness, tremor; low amplitude resting tremor in upper extremities, 4/5 UE strength bilaterally, 1/5 LE strength fantasma  ED Triage Vitals   Temperature Pulse Respirations Blood Pressure SpO2   05/03/22 0000 05/02/22 2041 05/02/22 2041 05/02/22 2041 05/02/22 2041   98 °F (36 7 °C) (!) 46 18 (!) 210/97 100 %      Temp Source Heart Rate Source Patient Position - Orthostatic VS BP Location FiO2 (%)   05/03/22 0000 05/02/22 2041 05/02/22 2041 05/02/22 2041 --   Oral Monitor Lying Right arm       Pain Score       05/03/22 0000       No Pain          Wt Readings from Last 1 Encounters:   05/03/22 56 7 kg (125 lb)     Additional Vital Signs:   Date/Time Temp Pulse Resp BP MAP (mmHg) SpO2 O2 Device Patient Position - Orthostatic VS   05/03/22 1330 -- 56 -- 177/74 Abnormal  107 98 % -- --   05/03/22 1130 -- 54 Abnormal  -- 152/66 95 98 % --        Date/Time Temp Pulse Resp BP MAP (mmHg) SpO2 O2 Device Patient Position - Orthostatic VS   05/03/22 0800 -- 62 16 144/63 91 98 % None (Room air) Lying   05/03/22 0430 -- 60 16 131/66 93 98 % None (Room air) Lying   05/03/22 0400 -- 62 16 148/73 104 98 % None (Room air) Lying   05/03/22 0200 -- 76 18 121/61 85 96 % None (Room air) Lying   05/03/22 0100 -- 88 18 169/74 107 98 % -- Lying   05/03/22 0014 -- -- -- -- -- -- None (Room air) --   05/03/22 0000 98 °F (36 7 °C) 82 18 124/57 85 97 % None (Room air) Lying   05/02/22 2300 -- 82 16 127/59 85 97 % -- --   05/02/22 2245 -- 82 16 126/62 87 97 % None (Room air) --   05/02/22 2145 -- 80 16 137/63 90 97 % None (Room air) --   05/02/22 2130 -- 82 -- 147/70 100 98 % -- --   05/02/22 2100 -- 92 -- 179/88 Abnormal  122 98 % -- --   05/02/22 2044 -- -- -- -- -- 100 % -- --   05/02/22 2041 -- 46 Abnormal  18 210/97 Abnormal  -- 100 % None (Room air) Lying       Weights (last 14 days)    Date/Time Weight Weight Method Height   05/03/22 0000 56 7 kg (125 lb) Bed scale 4' 8" (1 422 m       Pertinent Labs/Diagnostic Test Results:   CT head without contrast   Final Result by Norman Chavez DO (05/02 2150)      Images degraded by motion artifact  No acute intracranial abnormality identified within the limitations of the examination  Pansinusitis again noted  Volume loss with unchanged ventriculomegaly, which could suggest mild superimposed hydrocephalus  Correlate for any clinical evidence of normal pressure hydrocephalus        XR chest 1 view portable   ED Interpretation by Domitila Herrera MD (05/02 2149)   No acute cardiopulmonary disease identified      Final Result by Dino Anderson MD (05/03 0920)      No acute cardiopulmonary disease        MRI brain w wo contrast    (Results Pending)       5/2 1st EKG remarkable for bigeminy and was limited by artifact; follow up EKG was sinus rhythm with regular rate also limited by artifact  Results from last 7 days   Lab Units 05/03/22  0507 05/02/22  2049   WBC Thousand/uL 14 12* 9 89   HEMOGLOBIN g/dL 11 8* 12 7   HEMATOCRIT % 35 6* 40 1   PLATELETS Thousands/uL 207 223   NEUTROS ABS Thousands/µL 12 14* 8 16*         Results from last 7 days   Lab Units 05/03/22  0507 05/02/22  2049   SODIUM mmol/L 145 145   POTASSIUM mmol/L 3 6 4 0   CHLORIDE mmol/L 111* 108   CO2 mmol/L 28 30   ANION GAP mmol/L 6 7   BUN mg/dL 21 22   CREATININE mg/dL 0 82 0 97   EGFR ml/min/1 73sq m 81 71   CALCIUM mg/dL 8 6 8 6   MAGNESIUM mg/dL 1 7 1 7   PHOSPHORUS mg/dL 2 7  --      Results from last 7 days   Lab Units 05/03/22  0507 05/02/22  2049   AST U/L 21 18   ALT U/L 28 26   ALK PHOS U/L 66 81   TOTAL PROTEIN g/dL 6 3* 7 0   ALBUMIN g/dL 2 9* 3 3*   TOTAL BILIRUBIN mg/dL 0 64 0 37     Results from last 7 days   Lab Units 05/03/22  1601 05/03/22  0718   POC GLUCOSE mg/dl 216* 153*     Results from last 7 days   Lab Units 05/03/22  0507 05/02/22  2049   GLUCOSE RANDOM mg/dL 164* 235*             No results found for: BETA-HYDROXYBUTYRATE                   Results from last 7 days   Lab Units 05/03/22  0100 05/02/22  2336 05/02/22 2049   HS TNI 0HR ng/L  --   --  9   HS TNI 2HR ng/L  --  9  --    HSTNI D2 ng/L  --  0  --    HS TNI 4HR ng/L 9  --   --    HSTNI D4 ng/L 0  --   --          Results from last 7 days   Lab Units 05/02/22  2049   PROTIME seconds 14 7*   INR  1 15   PTT seconds 27     Results from last 7 days   Lab Units 05/02/22 2049   TSH 3RD GENERATON uIU/mL 0 200*         ED Treatment:   Medication Administration from 05/02/2022 2033 to 05/03/2022 0849       Date/Time Order Dose Route Action     05/03/2022 0549 heparin (porcine) subcutaneous injection 5,000 Units 5,000 Units Subcutaneous Given     05/03/2022 0101 multi-electrolyte (PLASMALYTE-A/ISOLYTE-S PH 7 4) IV solution 100 mL/hr Intravenous New Bag        Past Medical History:   Diagnosis Date    Allergic     Lactose: Shellfish    Asthma     [facility]     Diabetes mellitus ([facility] )     Diverticulitis of colon     Glaucoma     Memory loss      Present on Admission:   Ambulatory dysfunction   Type 2 diabetes mellitus without complication, without long-term current use of insulin (HCC)   Mixed hyperlipidemia   D[facility] without [facility] disturbance ([facility])   Bradycardia   Tremor of both hands   BPH (benign prostatic hyperplasia)      Admitting Diagnosis: Seizure ([facility] ) [R56 [id number]]  Age/Sex: 82 y o  male  Admission Orders:  Tele  eeg  Seizure precaution  Orthostatic vitals  Check pulse OX while ambulating    Bedside swallow eval   Scheduled Medications:  aspirin, 81 mg, Oral, QAM  atorvastatin, 20 mg, Oral, Daily  heparin (porcine), 5,000 Units, Subcutaneous, Q8H LUISA  insulin lispro, 1-5 Units, Subcutaneous, TID AC  [START ON 5/4/2022] levothyroxine, 112 mcg, Oral, QAM  polyethylene glycol, 17 g, Oral, Daily  tamsulosin, 0 4 mg, Oral, Daily With Dinner      Continuous IV Infusions:  multi-electrolyte, 100 mL/hr, Intravenous, Continuous      PRN Meds:  acetaminophen, 650 mg, Oral, Q6H PRN  calcium carbonate, 1,000 mg, Oral, Daily PRN  LORazepam, 0 5 mg, Intravenous, Once PRN  ondansetron, 4 mg, Intravenous, Q6H PRN        IP CONSULT TO NEUROLOGY  IP CONSULT TO GERONTOLOGY    Network Utilization Review Department  ATTENTION: Please call with any questions or concerns to [phone] and carefully listen to the prompts so that you are directed to the right person   All voicemails are confidential   Taylor Shah all requests for admission clinical reviews, approved or denied determinations and any other requests to dedicated fax number below belonging to the campus where the patient is receiving treatment   List of dedicated fax numbers for the Facilities:  [address] [place] [address] DENIALS (Administrative/Medical Necessity) [phone]   [address]  [facility] (Maternity/NICU/Pediatrics) [phone]   [address]  [address] [place] [phone] [place] [address] [address] [place] [address] [facility] [place] [address] [phone]

## 2022-05-03 NOTE — PROGRESS NOTES
[facility]  Progress Note - Chris Edge 1935, 82 y o  male MRN: [id number]  Unit/Bed#: ED 11 Encounter: [id number]  Primary Care Provider: Lisa Alvarado DO   Date and time admitted to hospital: 5/2/2022  8:33 PM    Leukocytosis  Assessment & Plan  5/3:  Elevated WBCs (14 12) with neutrophil predominance  Pt wears adult diaper, increased risk for UTI    Plan:  · Check UA    Dehydration  Assessment & Plan  Low albumin and dry mucus membranes  NSS hydration, encourage PO intake of fluids    BPH (benign prostatic hyperplasia)  Assessment & Plan  Continue home flomax and monitor I/Os    Bradycardia  Assessment & Plan  POA: Pt had episode of bradycardia to 46bpm in ED triage   First EKG remarkable for bigeminy and was limited by artifact; follow up EKG was sinus rhythm with regular rate also limited by artifact  Telemetry reviewed and unremarkable, discontinued    Dementia without behavioral disturbance (HCC)  Assessment & Plan  Longstanding history of dementia  5/3 - Pt AOx0  Patient at risk for delirium given age and co-morbidities    Plan:  -Recommend global delirium precautions as follows:   -Establishment of day/night cycle via lights during the day and blinds open    -Please limit interruptions at night as medically appropriate   -Patient should be out of bed during the day as tolerated or medically indicated   -Provide glasses/hearing aids as appropriate   -Minimize deliriogenic meds (including, but not limited to, tramadol, benzodiazepines, anticholinergics, Benadryl)   -Provide reorientation including date on board and visible clock   -Treat pain as indicated via geriatric pain protocol  -Monitor for constipation and urinary retention   -Avoid restraints as able, frequent verbal reorientations or patient care sitter as appropriate   -Consider use of melatonin qHS for circadian rhythm maintenance  Fall precautions  Bedside swallow evaluation, aspiration precautions    Mixed hyperlipidemia  Assessment & Plan  Continue home atorvastatin    Type 2 diabetes mellitus without complication, without long-term current use of insulin (HCC)  Assessment & Plan  Lab Results   Component Value Date    HGBA1C 8 4 (H) 12/14/2021       Recent Labs     05/03/22  0718   POCGLU 153*       Blood Sugar Average: Last 72 hrs:  · Pt takes metformin at home, hold home med  · SSI while inpatient  · Accuchecks    History of CVA (cerebrovascular accident)  Assessment & Plan  Continue home aspirin  CT head shows no acute findings of infarct or hemmhorrage on final read; findings suggest NPH if clinical correlation  Difficult to correlate due to pt's altered mentation due to dementia at baseline, gait dysfunction at baseline, and urinary and fecal incontinence at baseline  No acute changes in the above per daughter  MRI on hold, unable to lay still  Neurology consulted, appreciate recommendation    Ambulatory dysfunction  Assessment & Plan  Pt with history of dependence on assistance for walking  Daughter reports pt walks at home with walker and assistance, however mainly sits in wheelchair; she denies acute change    Plan:  · Fall precautions  · Pt should only be out of bed with assistance  · PT/OT evaluation    * Tremor of both hands  Assessment & Plan  POA: Pt's daughter states that she witnessed her father take a sip of juice at the dinner table and then watched his hands start shaking and his arms began moving back and forth uncontrollably which lasted for a few minutes then resolved  Pt's daughter states that pt has baseline tremor but never that severe  CT head without contrast, Result Date: 5/2/2022  Impression: Images degraded by motion artifact  No acute intracranial abnormality identified within the limitations of the examination  Pansinusitis again noted  Volume loss with unchanged ventriculomegaly, which could suggest mild superimposed hydrocephalus    Correlate for any clinical evidence of normal pressure hydrocephalus  Magnesium 1 7  TSH low, elevated Free T4    5/3:  CBC shows leukocytosis, anemia  Exam shows low amplitude resting tremor in UEs bilaterally  Suspicion for worsening baseline tremor d/t supratherapeutic levothyroxine dose, deconditioning, and/or possible infection, Need to r/o seizure and stroke      Plan:   Follow neuro recommendations:  · EEG to evaluate for seizure activity  · Hold MRI brain, patient may not tolerate d/t excess movement  · Decrease levothyroxine dose to 112mg QD, repeat TSH in 4-6 weeks      VTE Pharmacologic Prophylaxis: VTE Score: 4 Moderate Risk (Score 3-4) - Pharmacological DVT Prophylaxis Ordered: heparin  Patient Centered Rounds: I performed bedside rounds with nursing staff today  Discussions with Specialists or Other Care Team Provider: Nursing team, reviewed Geriatrics and Neurology notes    Education and Discussions with Family / Patient: Updated  (daughter) at bedside  Current Length of Stay: 0 day(s)  Current Patient Status: Inpatient   Discharge Plan: Anticipate discharge in 24-48 hrs to home with home services  Code Status: Level 1 - Full Code    Subjective: This AM pt AOx  Overall appears relaxed in supine position, does not appear in acute distress  As per daughter, pt at baseline level of mental status  Objective:     Vitals:   Temp (24hrs), Av °F (36 7 °C), Min:98 °F (36 7 °C), Max:98 °F (36 7 °C)    Temp:  [98 °F (36 7 °C)] 98 °F (36 7 °C)  HR:  [46-92] 56  Resp:  [16-18] 16  BP: (121-210)/(57-97) 177/74  SpO2:  [96 %-100 %] 98 %  Body mass index is 28 02 kg/m²  Input and Output Summary (last 24 hours): Intake/Output Summary (Last 24 hours) at 5/3/2022 1602  Last data filed at 5/3/2022 1600  Gross per 24 hour   Intake --   Output 300 ml   Net -300 ml       Physical Exam:   Physical Exam  Vitals and nursing note reviewed  Constitutional:       General: He is awake  Appearance: Normal appearance   He is well-developed  HENT:      Head: Normocephalic and atraumatic  Eyes:      Conjunctiva/sclera: Conjunctivae normal    Cardiovascular:      Rate and Rhythm: Normal rate and regular rhythm  Heart sounds: No murmur heard  Pulmonary:      Effort: Pulmonary effort is normal  No respiratory distress  Breath sounds: Normal breath sounds  Abdominal:      Palpations: Abdomen is soft  Tenderness: There is no abdominal tenderness  Musculoskeletal:      Cervical back: Neck supple  Skin:     General: Skin is warm and dry  Neurological:      General: No focal deficit present  Mental Status: He is alert and easily aroused  Mental status is at baseline  He is disoriented  Motor: Weakness and tremor present  Comments: Low amplitude resting tremor in upper extremities  4/5 UE strength bilaterally  1/5 LE strength bilaterally            Additional Data:     Labs:  Results from last 7 days   Lab Units 05/03/22  0507   WBC Thousand/uL 14 12*   HEMOGLOBIN g/dL 11 8*   HEMATOCRIT % 35 6*   PLATELETS Thousands/uL 207   NEUTROS PCT % 85*   LYMPHS PCT % 8*   MONOS PCT % 5   EOS PCT % 1     Results from last 7 days   Lab Units 05/03/22  0507   SODIUM mmol/L 145   POTASSIUM mmol/L 3 6   CHLORIDE mmol/L 111*   CO2 mmol/L 28   BUN mg/dL 21   CREATININE mg/dL 0 82   ANION GAP mmol/L 6   CALCIUM mg/dL 8 6   ALBUMIN g/dL 2 9*   TOTAL BILIRUBIN mg/dL 0 64   ALK PHOS U/L 66   ALT U/L 28   AST U/L 21   GLUCOSE RANDOM mg/dL 164*     Results from last 7 days   Lab Units 05/02/22  2049   INR  1 15     Results from last 7 days   Lab Units 05/03/22  0718   POC GLUCOSE mg/dl 153*               Lines/Drains:  Invasive Devices  Report    Peripheral Intravenous Line            Peripheral IV 05/02/22 Right Antecubital <1 day                  Telemetry:  Telemetry Orders (From admission, onward)             48 Hour Telemetry Monitoring  Continuous x 48 hours        References:    Telemetry Guidelines   Question: Reason for 48 Hour Telemetry  Answer:  Arrhythmias Requiring Medical Therapy (eg  SVT, Vtach/fib, Bradycardia, Uncontrolled A-fib)                 Telemetry Reviewed: Normal Sinus Rhythm  Indication for Continued Telemetry Use: No indication for continued use  Will discontinue  Imaging: Reviewed radiology reports from this admission including: CT head    CT head without contrast   Final Result by Damon Shah DO (05/02 2150)      Images degraded by motion artifact  No acute intracranial abnormality identified within the limitations of the examination  Pansinusitis again noted  Volume loss with unchanged ventriculomegaly, which could suggest mild superimposed hydrocephalus  Correlate for any clinical evidence of normal pressure hydrocephalus  XR chest 1 view portable   ED Interpretation by Gutierrez Rodriguez MD (05/02 2149)   No acute cardiopulmonary disease identified      Final Result by Jasmin Nickerson MD (05/03 0920)      No acute cardiopulmonary disease              Recent Cultures (last 7 days):         Last 24 Hours Medication List:   Current Facility-Administered Medications   Medication Dose Route Frequency Provider Last Rate    acetaminophen  650 mg Oral Q6H PRN Jeimy Gutierrez, DO      aspirin  81 mg Oral QAM Jeimy Gutierrez DO      atorvastatin  20 mg Oral Daily Jeimy Gutierrez DO      calcium carbonate  1,000 mg Oral Daily PRN Jeimy Gutierrez DO      heparin (porcine)  5,000 Units Subcutaneous [facility] Jeimy Gutierrez DO      insulin lispro  1-5 Units Subcutaneous TID AC Jeimy Gutierrez DO      [START ON 5/4/2022] levothyroxine  112 mcg Oral QAM Andie Alvarado MD      LORazepam  0 5 mg Intravenous Once PRN Jeimy Gutierrez DO      multi-electrolyte  100 mL/hr Intravenous Continuous Jeimy Gutierrez  mL/hr (05/03/22 0101)    ondansetron  4 mg Intravenous Q6H PRN Jeimy Gutierrez DO      polyethylene glycol  17 g Oral Daily Jeimy Gutierrez DO      tamsulosin  0 4 mg Oral Daily With De Worthy DO          Today, Patient Was Seen By: Guy Quinteros MD    **Please Note: This note may have been constructed using a voice recognition system  **

## 2022-05-03 NOTE — CONSULTS
NEUROLOGY RESIDENCY CONSULT NOTE     Name: Gabi Jones   Age & Sex: 82 y o  male   MRN: [id number]  Unit/Bed#: ED 11   Encounter: [id number]  Length of Stay: 0    ASSESSMENT & PLAN     Dementia without behavioral disturbance [facility])  Assessment & Plan  Patient is an 28-year-old male with history of dementia, diabetes, hypothyroidism hyperlipidemia and CVA who presents with seizure-like activity  Daughter describes a transient episode of bilateral upper extremity flapping tremor  No evidence of a tonic phase preceding this  No report of tongue bite or urinary incontinence  No postictal state  Patient does have a poor baseline with minimal orientation and awareness  He is dependent on ADLs and uses diapers  Daughter states that he has increased tone at baseline  On arrival to the ED /97  CTH wo any acute intracranial abnormality  Unchanged ventriculomegaly  Possible superimposed hydrocephalus  TSH 0 200 with T4 1 50  WBC initially 9 89 and up trended to 14  12  Physical exam at baseline as per daughter  Impression:  Likely deconditioning  Possible tremor exacerbated metabolic disturbances including supratherapeutic levothyroxine and possible infection  Seizure and stroke lower on the differential     Plan:  · Routine EEG for baseline  · Hold MRI for the moment, patient may not be able to tolerate given mental status  · Adjust levothyroxine and monitor TSH  · Straight catheterization for UA, rule out UTI  · Continue aspirin and statin  · Appreciate geriatric medicine consult  · Management of metabolic disturbances by primary team  · Delirium precautions  · Call or notify Neurology if any recurrent seizure-like activity        Recommendations for outpatient neurological follow up have yet to be determined       SUBJECTIVE     Reason for Consult / Principal Problem:  Seizure-like activity  Hx and PE limited by:  Dementia    HPI: Gabi Jones is a 82 y o   male with history of dementia, diabetes, hypothyroidism, hyperlipidemia and CVA who presents with seizure-like activity  History gathered by daughter  Patient yesterday at the dinner table after eating and taking a sip of juice when he developed involuntary tremor of the arms  Event witness by daughter  She describes it as a bilateral UE flapping tremor  She denies any preceding stiffness or tonic phase  Daughter states that the tremor lasted a couple minutes  She called hison call nurse  While on the phone it started to subside into a milder form  She states that during the episode patient was able to answer questions  He was unaware of the tremor but daughter states this is consistent with his dementia  She denies any blood at the mouth     She denies any facial droop, weakness or a postictal period  However this is difficult given his baseline dementia  She is unsure if there was loss of urinary continence given that he wears a diaper  She denies any foul smell of the diaper  On arrival to the ED /97  CTH wo any acute intracranial abnormality  Unchanged ventriculomegaly  Possible superimposed hydrocephalus  TSH 0 200 with T4 1 50  WBC initially 9 89 and up trended to 14  12  Patient lives home with his daughter  Daughter states that he does not a history of seizures  Does have a history of stroke  She does not know what vascular territory was involved or what residual deficit  She denies any history of malignancy or hypertension  She denies patient having any metal in his body  At baseline he needs assistance to stand and uses a walker for ambulation  At baseline he is not oriented to place, time, date, president or current events  He recognizes his daughter on and off  He does not have any dysarthria and has a tangential thought process  Patient has a difficult time swallowing his medications  His daughter crushes his pills and gives it to him in applesauce  She denies him having any difficulty swallowing food   She states that he needs direction for eating as if he is left alone he can have some regression in eat with his hands  Patient on a daily aspirin  Inpatient consult to Neurology  Consult performed by: Liseth York MD  Consult ordered by: Rhina Husbands,           Historical Information   Past Medical History:   Diagnosis Date    Allergic     Lactose: Shellfish    Asthma     Dementia [facility] )     Diabetes mellitus [facility] )     Diverticulitis of colon     Glaucoma     Memory loss      Past Surgical History:   Procedure Laterality Date    EYE SURGERY      TESTICLE SURGERY       Social History   Social History     Substance and Sexual Activity   Alcohol Use Not Currently    Alcohol/week: 0 0 standard drinks     Social History     Substance and Sexual Activity   Drug Use Never     E-Cigarette/Vaping    E-Cigarette Use Never User      E-Cigarette/Vaping Substances    Nicotine No     THC No     CBD No     Flavoring No     Other No     Unknown No      Social History     Tobacco Use   Smoking Status Never Smoker   Smokeless Tobacco Never Used     Family History: non-contributory  Meds/Allergies   all current active meds have been reviewed  Allergies   Allergen Reactions    Lactose - Food Allergy     Shellfish-Derived Products - Food Allergy Diarrhea       Review of previous medical records was  completed  Review of Systems   Unable to perform ROS: Dementia       OBJECTIVE     Patient ID: Yeni Chau is a 82 y o  male  Vitals:   Vitals:    22 0400 22 0430 22 0800 22 1130   BP: 148/73 131/66 144/63 152/66   BP Location: Right arm Right arm Right arm    Pulse: 62 60 62 (!) 54   Resp: 16 16 16    Temp:       TempSrc:       SpO2: 98% 98% 98% 98%   Weight:       Height:          Body mass index is 28 02 kg/m²     No intake or output data in the 24 hours ending 22 1143    Temperature:   Temp (24hrs), Av °F (36 7 °C), Min:98 °F (36 7 °C), Max:98 °F (36 7 °C)    Temperature: 98 °F (36 7 °C)    Invasive Devices: Invasive Devices  Report    Peripheral Intravenous Line            Peripheral IV 05/02/22 Right Antecubital <1 day                Physical Exam  Neurological:      Deep Tendon Reflexes:      Reflex Scores:       Patellar reflexes are 2+ on the left side  Neurologic Exam     Mental Status   Patient inattentive  Does not answer questions appropriately  Smiles to social cues  Unable to tell me his birthdate or state that this is tomorrow  No answer to place      Cranial Nerves   No facial asymmetry  No gaze deviation, normal EOM  Pupils equal and reactive  No tongue deviation  Blinks to threat  Difficulty following commands in the upper and lower extremities     Motor Exam Increased muscle tone in both upper and lower extremity  Cogwheeling at the wrist L>R         Sensory Exam   Withdraws to discomfort in bilateral lower extremities     Gait, Coordination, and Reflexes     Reflexes   Left patellar: 2+  Right plantar: normal  Left plantar: normal  Right ankle clonus: absent  Left ankle clonus: absentHigh-frequency low-amplitude tremor worse with action        LABORATORY DATA     Labs: I have personally reviewed pertinent reports      Results from last 7 days   Lab Units 05/03/22  0507 05/02/22  2049   WBC Thousand/uL 14 12* 9 89   HEMOGLOBIN g/dL 11 8* 12 7   HEMATOCRIT % 35 6* 40 1   PLATELETS Thousands/uL 207 223   NEUTROS PCT % 85* 82*   MONOS PCT % 5 1*      Results from last 7 days   Lab Units 05/03/22  0507 05/02/22  2049   POTASSIUM mmol/L 3 6 4 0   CHLORIDE mmol/L 111* 108   CO2 mmol/L 28 30   BUN mg/dL 21 22   CREATININE mg/dL 0 82 0 97   CALCIUM mg/dL 8 6 8 6   ALK PHOS U/L 66 81   ALT U/L 28 26   AST U/L 21 18     Results from last 7 days   Lab Units 05/03/22  0507 05/02/22  2049   MAGNESIUM mg/dL 1 7 1 7     Results from last 7 days   Lab Units 05/03/22  0507   PHOSPHORUS mg/dL 2 7      Results from last 7 days   Lab Units 05/02/22  2049   INR  1 15   PTT seconds [address] [place] [address]      Radiology Results: I have personally reviewed pertinent reports  CT head without contrast   Final Result by Celestino Manuel DO (05/02 2150)      Images degraded by motion artifact  No acute intracranial abnormality identified within the limitations of the examination  Pansinusitis again noted  Volume loss with unchanged ventriculomegaly, which could suggest mild superimposed hydrocephalus  Correlate for any clinical evidence of normal pressure hydrocephalus  Workstation performed: [id number]         XR chest 1 view portable   ED Interpretation by Reymundo Rodriguez MD (05/02 2149)   No acute cardiopulmonary disease identified      Final Result by Jose Goss MD (05/03 0920)      No acute cardiopulmonary disease  Workstation performed: [id number]         MRI inpatient order    (Results Pending)       Other Diagnostic Testing: I have personally reviewed pertinent reports        ACTIVE MEDICATIONS     Current Facility-Administered Medications   Medication Dose Route Frequency    acetaminophen (TYLENOL) tablet 650 mg  650 mg Oral Q6H PRN    aspirin (ECOTRIN LOW STRENGTH) EC tablet 81 mg  81 mg Oral QAM    atorvastatin (LIPITOR) tablet 20 mg  20 mg Oral Daily    calcium carbonate (TUMS) chewable tablet 1,000 mg  1,000 mg Oral Daily PRN    heparin (porcine) subcutaneous injection 5,000 Units  5,000 Units Subcutaneous Q8H [address]    insulin lispro (HumaLOG) 100 units/mL subcutaneous injection 1-5 Units  1-5 Units Subcutaneous TID AC    levothyroxine tablet 125 mcg  125 mcg Oral QAM    LORazepam (ATIVAN) injection 0 5 mg  0 5 mg Intravenous Once PRN    multi-electrolyte (PLASMALYTE-A/ISOLYTE-S PH 7 4) IV solution  100 mL/hr Intravenous Continuous    ondansetron (ZOFRAN) injection 4 mg  4 mg Intravenous Q6H PRN    polyethylene glycol (MIRALAX) packet 17 g  17 g Oral Daily    tamsulosin (FLOMAX) capsule 0 4 mg  0 4 mg Oral Daily With Dinner       Prior to Admission medications    Medication Sig Start Date End Date Taking?  Authorizing Provider   aspirin (ECOTRIN LOW STRENGTH) 81 mg EC tablet Take 81 mg by mouth every morning   Yes Historical Provider, MD   atorvastatin (LIPITOR) 20 mg tablet Take 1 tablet (20 mg total) by mouth daily 2/5/21  Yes Dyana Mandujano,    ergocalciferol (ERGOCALCIFEROL) 1 25 MG (78817 UT) capsule Take 50,000 Units by mouth every 30 (thirty) days Every 15th 3/8/21  Yes Historical Provider, MD   levothyroxine 125 mcg tablet Take 125 mcg by mouth every morning   Yes Historical Provider, MD   metFORMIN (GLUCOPHAGE) 1000 MG tablet Take 1,000 mg by mouth daily with breakfast   9/26/21  Yes Historical Provider, MD   senna (Senexon) 8 6 MG tablet Take 1 tablet by mouth as needed  3/1/21  Yes Historical Provider, MD   tamsulosin (FLOMAX) 0 4 mg Take 1 capsule (0 4 mg total) by mouth daily with dinner 2/17/21  Yes JESSICA Navarro         CODE STATUS & ADVANCED DIRECTIVES     Code Status: Level 1 - Full Code  Advance Directive and Living Will:      Power of : Yes  POLST:        VTE Pharmacologic Prophylaxis: Heparin  VTE Mechanical Prophylaxis: sequential compression device    ==  MD Evin Chacon 75 Neurology Residency, PGY-3

## 2022-05-03 NOTE — H&P
[facility]  H&P- Liam Rivas 1935, 82 y o  male MRN: [id number]  Unit/Bed#: ED 11 Encounter: [id number]  Primary Care Provider: Patrick Garrido DO   Date and time admitted to hospital: 5/2/2022  8:33 PM    * Tremor of both hands  Assessment & Plan  POA: Pt's daughter states that she witnessed her father take a sip of juice at the dinner table and then watched his hands start shaking and his arms began moving back and forth uncontrollably which lasted for a few minutes then resolved  Pt's daughter states that pt has baseline tremor but never that severe  CT head without contrast, Result Date: 5/2/2022  Impression: Images degraded by motion artifact  No acute intracranial abnormality identified within the limitations of the examination  Pansinusitis again noted  Volume loss with unchanged ventriculomegaly, which could suggest mild superimposed hydrocephalus  Correlate for any clinical evidence of normal pressure hydrocephalus  Magnesium 1 7  CBC unremarkable, CMP no electrolyte abnormalities  TSH low, therapeutic dose of synthroid    Pt could have had hypoglycemia, seizure-like activity, worsening of baseline essential tremor, near syncopal episode, CVA, TIA, arrhythmia event, cardiac event, NPH  Plan:   EEG to evaluate for seizure activity  Neurology consult  MRI brain for further evaluation of findings on CT  Check troponins   Orthostatic vitals  CBC and BMP in AM    Bradycardia  Assessment & Plan  POA: Pt had episode of bradycardia to 46bpm in ED triage   First EKG remarkable for bigeminy and was limited by artifact; follow up EKG was sinus rhythm with regular rate also limited by artifact    Pt's symptoms could be due to possible arrhythmia  Monitor telemetry  Monitor vitals closely and monitor for symptoms    Dehydration  Assessment & Plan  Low albumin and dry mucus membranes  NSS hydration, encourage PO intake of fluids    BPH (benign prostatic hyperplasia)  Assessment & Plan  Continue home flomax and monitor I/Os    Dementia without behavioral disturbance (HCC)  Assessment & Plan  -Patient at risk for delirium given age and co-morbidities  -Recommend global delirium precautions as follows:   -Establishment of day/night cycle via lights during the day and blinds open    -Please limit interruptions at night as medically appropriate   -Patient should be out of bed during the day as tolerated or medically indicated   -Provide glasses/hearing aids as appropriate   -Minimize deliriogenic meds (including, but not limited to, tramadol, benzodiazepines, anticholinergics, Benadryl)   -Provide reorientation including date on board and visible clock   -Treat pain as indicated via geriatric pain protocol  -Monitor for constipation and urinary retention   -Avoid restraints as able, frequent verbal reorientations or patient care sitter as appropriate   -Consider use of melatonin qHS for circadian rhythm maintenance  Fall precautions  Bedside swallow evaluation, aspiration precautions    Mixed hyperlipidemia  Assessment & Plan  Continue home atorvastatin    Type 2 diabetes mellitus without complication, without long-term current use of insulin (HCC)  Assessment & Plan  Lab Results   Component Value Date    HGBA1C 8 4 (H) 12/14/2021       No results for input(s): POCGLU in the last 72 hours  Blood Sugar Average: Last 72 hrs:  Pt takes metformin at home, hold home med  SSI while inpatient  Accuchecks    History of CVA (cerebrovascular accident)  Assessment & Plan  Continue home aspirin  CT head shows no acute findings of infarct or hemmhorrage on final read; findings suggest NPH if clinical correlation  Difficult to correlate due to pt's altered mentation due to dementia at baseline, gait dysfunction at baseline, and urinary and fecal incontinence at baseline   No acute changes in the above per daughter  MRI for further evaluation  Neurology consult    Ambulatory dysfunction  Assessment & Plan  Pt with history of dependence on assistance for walking  Daughter reports pt walks at home with walker and assistance, however mainly sits in wheelchair; she denies acute change  Pt should only be out of bed with assistance   PT/OT evaluation    VTE Prophylaxis: Heparin  / sequential compression device   Code Status: level 1  POLST: POLST form is not discussed and not completed at this time  Anticipated Length of Stay:  Patient will be admitted on an Observation basis with an anticipated length of stay of  < 2 midnights  Justification for Hospital Stay: near syncope    Chief Complaint:   Near syncope    History of Present Illness:    was offered  Pt's daughter Bonnie Miller who is POA declined on his behalf and chose to interpret for pt  Haley Torres is a 82 y o  male who presents with shaking of his hands uncontrollably and was brought in from home via EMS  No previous episode  His daughter reports that she witnessed the event and states there was no facial droop or biting of the tongue  She is not sure if there was any urinary incontinence during the episode because she did not check patient's diaper at that time and he is incontinent at baseline  She states that during the episode, pt appeared "spaced out" and was unaware he was shaking  He was able to respond to questions and spoke with his daughters throughout episode which lasted for a few minutes and involved his BL upper extremities  The event occurred at the dinner table just after patient had a sip of juice and had eaten  He does have a baseline essential that his daughter says is not severe  She denies history of hypotension or bradycardia, complaints of pain  Pt denies pain at this time and denies headache  His daughter states at baseline he has advanced dementia and sits frequently with little activity but enjoys interactions with her and lives in her home   At times he responds to questions but others he is inattentive and will not respond  He speaks only [address] and she states that he will not respond appropriately to [employer] or phone interpreters but may respond to [address] speaking person if they are in room  He is resting comfortably in bed and mostly inattentive and chooses not to respond to most questions  Review of Systems:    Review of Systems   Unable to perform ROS: Dementia (limited by pt's mental status)   Constitutional: Negative for activity change, appetite change, chills and fever  HENT: Negative for congestion and sore throat  Eyes: Negative for pain, redness and visual disturbance  Respiratory: Negative for shortness of breath  Cardiovascular: Negative for chest pain  Genitourinary: Negative for dysuria  Neurological: Positive for tremors and seizures (seizure like activity reported as in HPI with no history of seizure)  Negative for syncope, facial asymmetry, speech difficulty and headaches  Past Medical and Surgical History:     Past Medical History:   Diagnosis Date    Allergic     Lactose: Shellfish    Asthma     [facility]     [facility]     Diverticulitis of colon     Glaucoma     Memory loss        Past Surgical History:   Procedure Laterality Date    EYE SURGERY      TESTICLE SURGERY         Meds/Allergies:    Prior to Admission medications    Medication Sig Start Date End Date Taking?  Authorizing Provider   aspirin (ECOTRIN LOW STRENGTH) 81 mg EC tablet Take 81 mg by mouth every morning    Historical Provider, MD   atorvastatin (LIPITOR) 20 mg tablet Take 1 tablet (20 mg total) by mouth daily 2/5/21   Mil Romero DO   ergocalciferol (ERGOCALCIFEROL) 1 25 MG (20676 UT) capsule Take 50,000 Units by mouth every 30 (thirty) days Every 15th 3/8/21   Historical Provider, MD   levothyroxine 125 mcg tablet Take 125 mcg by mouth every morning    Historical Provider, MD   metFORMIN (GLUCOPHAGE) 850 mg tablet Take 850 mg by mouth daily 9/26/21   Historical Provider, MD   senna (Senexon) 8 6 MG tablet Take 1 tablet by mouth as needed  3/1/21   Historical Provider, MD   tamsulosin (FLOMAX) 0 4 mg Take 1 capsule (0 4 mg total) by mouth daily with dinner 2/17/21   JESSICA Warner     I have reviewed home medications with patient family member  Allergies: Allergies   Allergen Reactions    Lactose - Food Allergy     Shellfish-Derived Products - Food Allergy Diarrhea       Social History:     Marital Status:    Occupation: retired  Patient Pre-hospital Living Situation: home with daughter  Patient Pre-hospital Level of Mobility: urine and fecal incontinent, Dependent on assistance for all ADLs  Patient Pre-hospital Diet Restrictions: none  Substance Use History:   Social History     Substance and Sexual Activity   Alcohol Use Not Currently    Alcohol/week: 0 0 standard drinks     Social History     Tobacco Use   Smoking Status Never Smoker   Smokeless Tobacco Never Used     Social History     Substance and Sexual Activity   Drug Use Never       Family History:    Family History   Problem Relation Age of Onset    No Known Problems Mother     No Known Problems Father     No Known Problems Sister     No Known Problems Brother     No Known Problems Daughter     No Known Problems Sister     No Known Problems Sister     No Known Problems Brother     No Known Problems Brother     No Known Problems Brother     No Known Problems Brother     No Known Problems Daughter     No Known Problems Daughter     Colon cancer Neg Hx        Physical Exam:     Vitals:   Blood Pressure: 131/66 (05/03/22 0430)  Pulse: 60 (05/03/22 0430)  Temperature: 98 °F (36 7 °C) (05/03/22 0000)  Temp Source: Oral (05/03/22 0000)  Respirations: 16 (05/03/22 0430)  Height: 4' 8" (142 2 cm) (05/03/22 0000)  Weight - Scale: 56 7 kg (125 lb) (05/03/22 0000)  SpO2: 98 % (05/03/22 0430)    Physical Exam  Vitals reviewed  Constitutional:       General: He is awake  He is not in acute distress  Appearance: He is ill-appearing  HENT:      Head: Normocephalic and atraumatic  Right Ear: External ear normal       Left Ear: External ear normal       Mouth/Throat:      Mouth: Mucous membranes are dry  Pharynx: Oropharynx is clear  Eyes:      General: No scleral icterus  Extraocular Movements: Extraocular movements intact  Pupils: Pupils are equal, round, and reactive to light  Cardiovascular:      Rate and Rhythm: Normal rate and regular rhythm  Pulses: Normal pulses  Heart sounds: Normal heart sounds  No murmur heard  Pulmonary:      Effort: No respiratory distress  Breath sounds: No wheezing, rhonchi or rales  Abdominal:      General: Bowel sounds are normal       Palpations: Abdomen is soft  Musculoskeletal:      Cervical back: Neck supple  No tenderness  Right lower leg: No edema  Left lower leg: No edema  Skin:     General: Skin is warm and dry  Neurological:      Mental Status: Mental status is at baseline  GCS: GCS eye subscore is 4  GCS verbal subscore is 5  GCS motor subscore is 6  Cranial Nerves: No facial asymmetry  Sensory: No sensory deficit  Motor: Tremor (BL arms and hands with intention) present  No abnormal muscle tone or pronator drift  Deep Tendon Reflexes: Babinski sign absent on the right side  Babinski sign absent on the left side  Reflex Scores:       Brachioradialis reflexes are 2+ on the right side and 2+ on the left side  Comments: Limited due to pt's dementia, does not follow commands and does not answer questions   Psychiatric:         Attention and Perception: He is inattentive  Mood and Affect: Mood and affect normal          Behavior: Behavior is uncooperative  Cognition and Memory: Memory is impaired  Additional Data:     Lab Results: I have personally reviewed pertinent reports        Results from last 7 days   Lab Units 05/02/22 2049   WBC Thousand/uL 9 89 HEMOGLOBIN g/dL 12 7   HEMATOCRIT % 40 1   PLATELETS Thousands/uL 223   NEUTROS PCT % 82*   LYMPHS PCT % 11*   MONOS PCT % 1*   EOS PCT % 5     Results from last 7 days   Lab Units 05/02/22 2049   POTASSIUM mmol/L 4 0   CHLORIDE mmol/L 108   CO2 mmol/L 30   BUN mg/dL 22   CREATININE mg/dL 0 97   CALCIUM mg/dL 8 6   ALK PHOS U/L 81   ALT U/L 26   AST U/L 18     Results from last 7 days   Lab Units 05/02/22 2049   INR  1 15       Imaging: I have personally reviewed pertinent reports  and I have personally reviewed pertinent films in PACS    CT head without contrast    Result Date: 5/2/2022  Narrative: CT BRAIN - WITHOUT CONTRAST INDICATION:   Syncope, recurrent ? Seizure, ?syncope, hypertension  COMPARISON:  Multiple priors most recently 3/24/2021 TECHNIQUE:  CT examination of the brain was performed  In addition to axial images, sagittal and coronal 2D reformatted images were created and submitted for interpretation  Radiation dose length product (DLP) for this visit:  1388 mGy-cm   This examination, like all CT scans performed in the [facility], was performed utilizing techniques to minimize radiation dose exposure, including the use of iterative reconstruction and automated exposure control  IMAGE QUALITY:  Images degraded by motion artifact  FINDINGS: PARENCHYMA: Decreased attenuation is noted in periventricular and subcortical white matter demonstrating an appearance that is statistically most likely to represent mild microangiopathic change  No CT signs of acute infarction  No intracranial mass, mass effect or midline shift  No acute parenchymal hemorrhage  VENTRICLES AND EXTRA-AXIAL SPACES:  Volume loss with unchanged ventriculomegaly, which could suggest mild superimposed hydrocephalus  This is similar to study of 3/24/2021  VISUALIZED ORBITS AND PARANASAL SINUSES:  Again seen is pansinus mucosal disease    There are aerosolized secretions in the bilateral maxillary sinuses with air-fluid level in the right maxillary sinus  CALVARIUM AND EXTRACRANIAL SOFT TISSUES:  Normal      Impression: Images degraded by motion artifact  No acute intracranial abnormality identified within the limitations of the examination  Pansinusitis again noted  Volume loss with unchanged ventriculomegaly, which could suggest mild superimposed hydrocephalus  Correlate for any clinical evidence of normal pressure hydrocephalus  Workstation performed: [id number]       EKG, Pathology, and Other Studies Reviewed on Admission:   · EKG: Both EKGs significantly limited by artifact  · EKG on presentation to ED shows regular rate with bigeminy and possible Afib but very difficult to distinguish due to artifact, no ST elevations are noted but there are nonspecific ST changes  · Second EKG no longer shows bigeminy and is sinus rhythm with regular rate, no ST elevations noted, nonspecific ST abnormalities    Epic / Care Everywhere Records Reviewed: Yes     ** Please Note: This note has been constructed using a voice recognition system   **

## 2022-05-03 NOTE — ASSESSMENT & PLAN NOTE
5/3:  Elevated WBCs (14 12) with neutrophil predominance  Pt wears adult diaper, increased risk for UTI    Plan:  · Check UA

## 2022-05-03 NOTE — SPEECH THERAPY NOTE
Speech-Language Pathology Bedside Swallow Evaluation        Patient Name: Neida Herbert    [id number] Date: 5/3/2022     Problem List  Principal Problem:    Tremor of both hands  Active Problems:    Ambulatory dysfunction    History of CVA (cerebrovascular accident)    Type 2 diabetes mellitus without complication, without long-term current use of insulin (HCC)    Mixed hyperlipidemia    Dementia without behavioral disturbance (HCC)    Bradycardia    BPH (benign prostatic hyperplasia)    Dehydration         Past Medical History  Past Medical History:   Diagnosis Date    Allergic     Lactose: Shellfish    Asthma     [facility]     [facility]     Diverticulitis of colon     Glaucoma     Memory loss        Past Surgical History  Past Surgical History:   Procedure Laterality Date    EYE SURGERY      TESTICLE SURGERY         Summary   Pt presents with mild pharyngeal dysphagia characterized by overt s/s of aspiration with thin liquids when consumed by cup and straw  Pt with intermittent dry & wet cough s/p swallow  Pt consumed trials of NTL via straw and cup, puree, soft, and regular solids without change in vocal quality, cough s/p swallow, or change in vital signs  ST to follow  Recommendations:   Diet: soft/level 3 diet and nectar thick liquids  Meds: as tolerated   Frequent Oral care: 4x/day  Aspiration precautions and compensatory swallowing strategies: upright posture and only feed when fully alert, full feed  Other Recommendations/ considerations: Pt is a Beninese speaker & minimally verbal at baseline  Current Medical Status  Pt is a 82 y o  male who presented to [address] [place] [address]  Emergency department on May 2, 2022 for evaluation of spell of abnormal behavior   Much of history obtained from patient's daughter as patient has a history of dementia, minimally verbal at baseline      Per patient's daughter, patient had a roughly 1 minute period where he was shaking his upper extremities  His mental status did not change  He had no fall, head strike, no loss consciousness  No clear postictal phase however difficult to interpret given patient's condition  Past medical history:  Please see H&P for details    Special Studies:  05/02/2022: Chest XR: IMPRESSION: No acute cardiopulmonary disease  05/02/2022: CT Head Without Contrast: IMPRESSION: Images degraded by motion artifact  No acute intracranial abnormality identified within the limitations of the examination  Pansinusitis again noted  Volume loss with unchanged ventriculomegaly, which could suggest mild superimposed hydrocephalus  Correlate for any clinical evidence of normal pressure hydrocephalus  05/03/2022: MRI: PENDING    Social/Education/Vocational Hx:  Pt lives with family    Swallow Information   Current Risks for Dysphagia & Aspiration: AMS and dementia  Current Symptoms/Concerns: Refused PO intake with RN including medications by mouth  Current Diet: NPO   Baseline Diet: Suspect regular with Thin Liquids; pt unable to confirm due to cognitive status  Takes pills: unknown    Baseline Assessment   Behavior/Cognition: alert  Speech/Language Status: not able to to follow commands and limited verbal output  Patient Positioning: semi upright in bed     Swallow Mechanism Exam   Facial: symmetrical  Labial: unable to test 2/2 limited command following  Lingual: unable to test 2/2 limited command following  Velum: unable to visualize  Mandible: unable to test 2/2 limited command following  Dentition: adequate  Vocal quality:clear/adequate and with low volume   Volitional Cough: strong/productive   Respiratory: Room Air    Consistencies Assessed and Performance   Consistencies Administered: thin liquids, nectar thick, puree, soft solids and regular  Puree- applesauce, Soft- nutrigrain bar, Regular- luna cracker    Pt did not feed self or follow commands to take cup/spoon/solid       Oral Stage:   Pt able to retrieve bolus from cup, straw, spoon, and bite from solid  Pt with increased mastication of solids but no oral residue remained in the oral cavity s/p swallow  Anterior-posterior transfer of solids and liquids appeared timely  Pharyngeal Stage:   Upon palpation, laryngeal elevation appeared complete  Pt consumed trials of NTL via straw and cup, puree, soft, and regular solids without change in vocal quality, cough s/p swallow, or change in vital signs  Pt consumed trials of thin liquids via straw and cup with single and consecutive sips  Noted intermittent dry & wet cough s/p swallow  Esophageal Concerns: none reported      Results Reviewed with: patient, RN and MD   Dysphagia Goals: Pt will consume level 3 solids with NTL without overt s/s of aspiration x48 hours  Pt will trial advanced solids/liquids as clinically indicated  ST to follow         Speech Therapy Prognosis   Prognosis: good    Prognosis Considerations: therapeutic potential

## 2022-05-03 NOTE — OCCUPATIONAL THERAPY NOTE
Occupational Therapy Evaluation      Olena العراقي    5/3/2022    Patient Active Problem List   Diagnosis    Ambulatory dysfunction    Acquired hypothyroidism    History of CVA (cerebrovascular accident)    Type 2 diabetes mellitus without complication, without long-term current use of insulin ([facility] [address] [facility]    Mixed hyperlipidemia    Benign prostatic hyperplasia with weak urinary stream    Mild intermittent asthma without complication    Allergic reaction    Diverticulosis    Dementia without behavioral disturbance ([facility] [address] )    Urinary incontinence    Fecal incontinence    Encounter for medication review    Bradycardia    Cortical age-related cataract of both eyes    Tremor of both hands    BPH (benign prostatic hyperplasia)    Dehydration       Past Medical History:   Diagnosis Date    Allergic     Lactose: Shellfish    Asthma     Dementia ([facility] [address] )     [facility]     Diverticulitis of colon     Glaucoma     Memory loss        Past Surgical History:   Procedure Laterality Date    EYE SURGERY      TESTICLE SURGERY          05/03/22 1303   OT Last Visit   OT Visit Date 05/03/22   Note Type   Note type Evaluation   Restrictions/Precautions   Weight Bearing Precautions Per Order No   Other Precautions Cognitive; Bed Alarm;Telemetry;Multiple lines; Fall Risk   Pain Assessment   Pain Assessment Tool 0-10   Pain Score No Pain   Home Living   Type of Home House   Home Layout Two level;Bed/bath upstairs; Performs ADLs on one level; Able to live on main level with bedroom/bathroom;Stairs to enter with rails  (7 ONESIMO; full flight upstairs)   Bathroom Equipment Tub transfer bench   Bathroom Accessibility Other (Comment)  (does not use bathroom baseline )   [address] [facility]; Wheelchair-manual  (power lift recliner )   Additional Comments Pt stays on second floor of house and does not come down to main floor due to inability to manage steps     Prior Function   Level of [place] Needs assistance with IADLs; Needs assistance with ADLs and functional mobility   Lives With Daughter   Receives Help From Family;Personal care attendant   ADL Assistance Needs assistance   IADLs Needs assistance   Falls in the last 6 months 0   Vocational Retired   Comments Pt unable to report on PLOF due to baseline dementia  Daughter reports pt dependent for ADLs baseline, able to feed self once utensil is placed in hand  Daughter sponge bathes pt as she does not feel comfortable transferring pt into tub herself  Daughter keeps pt in briefs due to baseline incontinence  Pt able to complete sit<>stand with assistance from daughter  Usually able to take few steps with RW where daughter then bring w/c up behind pt  Usually spends days sitting in w/c  Also received A from [facility] staff from "[employer] 1x/day to assist with ADLs   Lifestyle   Autonomy Pt from home with daughter and requires A to complete all ADLs baseline    Reciprocal Relationships supprotive daughter who is primary caregiver; also [facility] services through [employer] who A with ADLs daily    Service to Others reitred     Intrinsic Gratification exercises, playing with cats, singing and watching [employer] [address]   Subjective   Subjective Pt with limited verbalizations throughout session, few one word responses in Cuban   ADL   LB Dressing Assistance 1  Total Assistance   LB Dressing Deficit Don/doff R sock; Don/doff L sock   Additional Comments Pt dependent for ADLs baseline from daughter  Able to feed self baseline once utensil is placed in hand  Bed Mobility   Supine to Sit 2  Maximal assistance   Additional items Increased time required;Verbal cues;LE management;Assist x 2  (actively resisting)   Sit to Supine 2  Maximal assistance   Additional items Assist x 2; Increased time required;Verbal cues;LE management  (due to fear )   Additional Comments Pt initially resisting supine>sit transfer  Daughter to assist and pt more cooperative   Pt daughter reporting pt is asssit x 1 for bed mobility baseline  Transfers   Sit to Stand 3  Moderate assistance   Additional items Assist x 1; Increased time required;Verbal cues  (with BUE support on RW )   Stand to Sit 3  Moderate assistance   Additional items Assist x 1; Increased time required;Verbal cues  (with BUE support on RW)   Additional Comments Pt required A x 2 to return to safe sitting edge of stretcher due to height discrepancy  Functional Mobility   Additional Comments Unable to assess due to cognition and fear    Balance   Static Sitting Fair   Dynamic Sitting Fair -   Static Standing Poor   Dynamic Standing Poor   Activity Tolerance   Activity Tolerance Patient limited by fatigue; Other (Comment)  (limited by cognition)   Medical Staff Made Aware Care coordinated with PT Makenna    Nurse Made Aware yes, RN ok to see pt    RUE Assessment   RUE Assessment WFL   LUE Assessment   LUE Assessment WFL   Hand Function   Gross Motor Coordination Functional   Fine Motor Coordination Functional   Sensation   Light Touch No apparent deficits   Vision-Basic Assessment   Current Vision Does not wear glasses   Cognition   Overall Cognitive Status Impaired   Arousal/Participation Alert   Attention Difficulty attending to directions   Orientation Level Oriented to person  (responds to name with + reps )   Memory Unable to assess   Following Commands Follows one step commands inconsistently   Comments Pt pleasant this session, smiling thoughout evaluation  Responds well to daughter who assisted with translation  Dementia baseline and daughter A 24/7 care  Assessment   Limitation Decreased ADL status; Decreased UE strength;Decreased Safe judgement during ADL;Decreased cognition;Decreased endurance;Decreased self-care trans;Decreased high-level ADLs   Prognosis Fair   Assessment  Patient is a 82 y o  male seen for OT evaluation s/p admit to [address] [place] [address] on 5/2/2022 w/Tremor of both hands   Comorbidities affecting patient's functional performance at time of assessment include: ambulatory dysfunction, hx of CVA, mixed hyperlipidemia, hypothyroidism, urinary incontinence, dementia and diabetes  Orders received for OT evaluation and treatment  Patient identified during session through name and wristband  PTA, patient was requiring assist for functional mobility without assistive device, requiring assist for ADLS, requiring assist for IADLS, living with daughter in a 2 level home with 7 steps to enter and retired  Personal factors affecting patient at time of initial evaluation include: inaccesible home environment, steps to enter, limited insight into deficits, flat affect, decreased initiation and engagement, difficulty performing ADLs and difficulty performing IADLs  Patient is alert and oriented to name, and presents with impaired judgement, inability to make safe decisions  The evaluation identifies the following performance deficits: weakness, impaired balance, decreased endurance, decreased coordination, increased fall risk, new onset of impairment of functional mobility, decreased ADLS, decreased IADLS, decreased activity tolerance, decreased safety awareness, impaired judgement, decreased cognition and decreased strength, that result in activity limitations  Based on the OT evaluation outcomes, functional performance deficits, and assessment findings, pt has been identified as high complexity, because the patient presents with comorbidites that affect occupational performance and required significant modification of tasks or assistance with consideration of multiple treatment options  The patient's raw score on the AM-PAC Daily Activity inpatient short form is 7, standardized score is 21 38  , less than 39 4  Based off of this evaluation, pt is functioning at baseline for ADL completion as pt is dependent baseline for daily tasks and only able to feed self   Pt daughter reporting no concerns about ability to continue to care for father upon DC  At this time, pt with no acute OT needs and will be DC from IPOT caseload  Spoke to daughter who may be interested in [facility] services for in home caregiver training  From OT standpoint, recommendation at time of d/c would be Home with home health rehabilitation  Recommendation   OT Discharge Recommendation Home with home health rehabilitation   OT - OK to Discharge Yes  (Once medically cleared)   Additional Comments  Pt seen as a co-eval with PT due to the patient's co-morbidities, clinically unstable presentation, and present impairments which are a regression from the patient's baseline  Additional Comments 2 At end of session, pt returned to supine in bed with all needs met and call bell within reach    AM-PAC Daily Activity Inpatient   Lower Body Dressing 1   Bathing 1   Toileting 1   Upper Body Dressing 1   Grooming 1   Eating 2   Daily Activity Raw Score 7   Turning Head Towards Sound 3   Follow Simple Instructions 2   Low Function Daily Activity Raw Score 12   Low Function Daily Activity Standardized Score 21 38   AM-PAC Applied Cognition Inpatient   Following a Speech/Presentation 1   Understanding Ordinary Conversation 2   Taking Medications 1   Remembering Where Things Are Placed or Put Away 1   Remembering List of 4-5 Errands 1   Taking Care of Complicated Tasks 1   Applied Cognition Raw Score 7   Applied Cognition Standardized Score 15 17       Pt admitted from home with daughter who is primary caregiver as pt is dependent for ADLs baseline  Based upon this evaluation, pt is functioning at baseline level for ADL completion and self-care tasks  Pt daughter reporting no concerns about DC to home and continuing care for pt  Spoke to pt about [facility] for caregiver training and daughter reporting interest  At this time, it is recommended that pt be DC to home with [facility] services  Pt with no further IPOT needs at this time and will be DC from IPOT caseload   Please reconsult if functional status changes      Arie Li, OT  '

## 2022-05-03 NOTE — DISCHARGE INSTR - DIET
Regular w/ thin - small single sips, upright positioning for meals   If s/s aspiration (throat clear/cough) w/ thin- consider thickening to nectar  Frequent and thorough oral care

## 2022-05-03 NOTE — ASSESSMENT & PLAN NOTE
Continue home aspirin  CT head shows no acute findings of infarct or hemmhorrage on final read; findings suggest NPH if clinical correlation  Difficult to correlate due to pt's altered mentation due to dementia at baseline, gait dysfunction at baseline, and urinary and fecal incontinence at baseline   No acute changes in the above per daughter  MRI for further evaluation  Neurology consult

## 2022-05-03 NOTE — ASSESSMENT & PLAN NOTE
POA: Pt had episode of bradycardia to 46bpm in ED triage   First EKG remarkable for bigeminy and was limited by artifact; follow up EKG was sinus rhythm with regular rate also limited by artifact    Pt's symptoms could be due to possible arrhythmia  Monitor telemetry  Monitor vitals closely and monitor for symptoms

## 2022-05-03 NOTE — ED PROVIDER NOTES
History  Chief Complaint   Patient presents with    Seizure - New Onset     pt arrives via EMS following a 1 minute seizure witnessed by bianca while in wheelchair  No fall, no headstrike, no LOC  No postictal state according to EMS     HPI     61-year-old male presents emergency department for evaluation of spell of abnormal behavior  Much of history obtained from patient's daughter as patient has a history of dementia, minimally verbal at baseline  Per patient's daughter, patient had a roughly 1 minute period where he was shaking his upper extremities  His mental status did not change  He had no fall, head strike, no loss consciousness  No clear postictal phase however difficult to interpret given patient's condition  Level 5 caveats due to patient's condition of nonverbal status, advanced dementia  Prior to Admission Medications   Prescriptions Last Dose Informant Patient Reported?  Taking?   aspirin (ECOTRIN LOW STRENGTH) 81 mg EC tablet   Yes No   Sig: Take 81 mg by mouth every morning   atorvastatin (LIPITOR) 20 mg tablet  Self No No   Sig: Take 1 tablet (20 mg total) by mouth daily   ergocalciferol (ERGOCALCIFEROL) 1 25 MG (93657 UT) capsule   Yes No   Sig: Take 50,000 Units by mouth every 30 (thirty) days Every 15th   levothyroxine 125 mcg tablet   Yes No   Sig: Take 125 mcg by mouth every morning   metFORMIN (GLUCOPHAGE) 850 mg tablet   Yes No   Sig: Take 850 mg by mouth daily   senna (Senexon) 8 6 MG tablet   Yes No   Sig: Take 1 tablet by mouth as needed    tamsulosin (FLOMAX) 0 4 mg   No No   Sig: Take 1 capsule (0 4 mg total) by mouth daily with dinner      Facility-Administered Medications: None       Past Medical History:   Diagnosis Date    Allergic     Lactose: Shellfish    Asthma     Dementia [facility] [facility]     Diabetes mellitus ([facility]     Diverticulitis of colon     Glaucoma     Memory loss        Past Surgical History:   Procedure Laterality Date    EYE SURGERY      TESTICLE SURGERY         Family History   Problem Relation Age of Onset    No Known Problems Mother     No Known Problems Father     No Known Problems Sister     No Known Problems Brother     No Known Problems Daughter     No Known Problems Sister     No Known Problems Sister     No Known Problems Brother     No Known Problems Brother     No Known Problems Brother     No Known Problems Brother     No Known Problems Daughter     No Known Problems Daughter     Colon cancer Neg Hx      I have reviewed and agree with the history as documented  E-Cigarette/Vaping    E-Cigarette Use Never User      E-Cigarette/Vaping Substances    Nicotine No     THC No     CBD No     Flavoring No     Other No     Unknown No      Social History     Tobacco Use    Smoking status: Never Smoker    Smokeless tobacco: Never Used   Vaping Use    Vaping Use: Never used   Substance Use Topics    Alcohol use: Not Currently     Alcohol/week: 0 0 standard drinks    Drug use: Never       Review of Systems   Unable to perform ROS: Dementia       Physical Exam  Physical Exam  Vitals and nursing note reviewed  Constitutional:       General: He is not in acute distress  Appearance: He is well-developed  He is not diaphoretic  Comments: Alert, interactive, no acute distress   HENT:      Head: Normocephalic and atraumatic  Right Ear: External ear normal       Left Ear: External ear normal    Eyes:      General:         Right eye: No discharge  Left eye: No discharge  Pupils: Pupils are equal, round, and reactive to light  Neck:      Thyroid: No thyromegaly  Trachea: No tracheal deviation  Cardiovascular:      Rate and Rhythm: Normal rate and regular rhythm  Heart sounds: No murmur heard  Pulmonary:      Effort: Pulmonary effort is normal       Breath sounds: Normal breath sounds  Abdominal:      General: Bowel sounds are normal  There is no distension  Palpations: Abdomen is soft  Tenderness: There is no abdominal tenderness  Musculoskeletal:         General: No deformity  Normal range of motion  Cervical back: Normal range of motion and neck supple  Skin:     General: Skin is warm  Capillary Refill: Capillary refill takes less than 2 seconds  Neurological:      Mental Status: He is alert  Cranial Nerves: No cranial nerve deficit  Motor: No abnormal muscle tone        Comments: Alert, baseline, dementia   Psychiatric:      Comments: Dementia         Vital Signs  ED Triage Vitals [05/02/22 2041]   Temp Pulse Respirations Blood Pressure SpO2   -- (!) 46 18 (!) 210/97 100 %      Temp src Heart Rate Source Patient Position - Orthostatic VS BP Location FiO2 (%)   -- Monitor Lying Right arm --      Pain Score       --           Vitals:    05/02/22 2041 05/02/22 2130 05/02/22 2145   BP: (!) 210/97 147/70 137/63   Pulse: (!) 46 82 80   Patient Position - Orthostatic VS: Lying           Visual Acuity  Visual Acuity      Most Recent Value   L Pupil Size (mm) 3   R Pupil Size (mm) 3   L Pupil Shape Round   R Pupil Shape Round          ED Medications  Medications - No data to display    Diagnostic Studies  Results Reviewed     Procedure Component Value Units Date/Time    HS Troponin I 4hr [[id number]]     Lab Status: No result Specimen: Blood     CBC and differential [[id number]]  (Abnormal) Collected: 05/02/22 2049    Lab Status: Final result Specimen: Blood from Arm, Right Updated: 05/02/22 2200     WBC 9 89 Thousand/uL      RBC 4 13 Million/uL      Hemoglobin 12 7 g/dL      Hematocrit 40 1 %      MCV 97 fL      MCH 30 8 pg      MCHC 31 7 g/dL      RDW 13 6 %      MPV 10 1 fL      Platelets 240 Thousands/uL      nRBC 0 /100 WBCs      Neutrophils Relative 82 %      Immat GRANS % 1 %      Lymphocytes Relative 11 %      Monocytes Relative 1 %      Eosinophils Relative 5 %      Basophils Relative 0 %      Neutrophils Absolute 8 16 Thousands/µL      Immature Grans Absolute 0 05 Thousand/uL      Lymphocytes Absolute 1 07 Thousands/µL      Monocytes Absolute 0 07 Thousand/µL      Eosinophils Absolute 0 50 Thousand/µL      Basophils Absolute 0 04 Thousands/µL     TSH, 3rd generation with Free T4 reflex [[id number]]  (Abnormal) Collected: 05/02/22 2049    Lab Status: Final result Specimen: Blood from Arm, Right Updated: 05/02/22 2127     TSH 3RD GENERATON 0 200 uIU/mL     Narrative:      Patients undergoing fluorescein dye angiography may retain small amounts of fluorescein in the body for 48-72 hours post procedure  Samples containing fluorescein can produce falsely depressed TSH values  If the patient had this procedure,a specimen should be resubmitted post fluorescein clearance  Magnesium [[id number]]  (Normal) Collected: 05/02/22 2049    Lab Status: Final result Specimen: Blood from Arm, Right Updated: 05/02/22 2127     Magnesium 1 7 mg/dL     T4, free [[id number]] Collected: 05/02/22 2049    Lab Status:  In process Specimen: Blood from Arm, Right Updated: 05/02/22 2126    HS Troponin I 2hr [[id number]]     Lab Status: No result Specimen: Blood     HS Troponin 0hr (reflex protocol) [[id number]]  (Normal) Collected: 05/02/22 2049    Lab Status: Final result Specimen: Blood from Arm, Right Updated: 05/02/22 2123     hs TnI 0hr 9 ng/L     Comprehensive metabolic panel [[id number]]  (Abnormal) Collected: 05/02/22 2049    Lab Status: Final result Specimen: Blood from Arm, Right Updated: 05/02/22 2116     Sodium 145 mmol/L      Potassium 4 0 mmol/L      Chloride 108 mmol/L      CO2 30 mmol/L      ANION GAP 7 mmol/L      BUN 22 mg/dL      Creatinine 0 97 mg/dL      Glucose 235 mg/dL      Calcium 8 6 mg/dL      Corrected Calcium 9 2 mg/dL      AST 18 U/L      ALT 26 U/L      Alkaline Phosphatase 81 U/L      Total Protein 7 0 g/dL      Albumin 3 3 g/dL      Total Bilirubin 0 37 mg/dL      eGFR 71 ml/min/1 73sq m     Narrative:      [place] guidelines for Chronic Kidney Disease (CKD):     Stage 1 with normal or high GFR (GFR > 90 mL/min/1 73 square meters)    Stage 2 Mild CKD (GFR = 60-89 mL/min/1 73 square meters)    Stage 3A Moderate CKD (GFR = 45-59 mL/min/1 73 square meters)    Stage 3B Moderate CKD (GFR = 30-44 mL/min/1 73 square meters)    Stage 4 Severe CKD (GFR = 15-29 mL/min/1 73 square meters)    Stage 5 End Stage CKD (GFR <15 mL/min/1 73 square meters)  Note: GFR calculation is accurate only with a steady state creatinine    Protime-INR [[id number]]  (Abnormal) Collected: 05/02/22 2049    Lab Status: Final result Specimen: Blood from Arm, Right Updated: 05/02/22 2114     Protime 14 7 seconds      INR 1 15    APTT [[id number]]  (Normal) Collected: 05/02/22 2049    Lab Status: Final result Specimen: Blood from Arm, Right Updated: 05/02/22 2114     PTT 27 seconds                  CT head without contrast   Final Result by Larry Tong DO (05/02 2150)      Images degraded by motion artifact  No acute intracranial abnormality identified within the limitations of the examination  Pansinusitis again noted  Volume loss with unchanged ventriculomegaly, which could suggest mild superimposed hydrocephalus  Correlate for any clinical evidence of normal pressure hydrocephalus              Workstation performed: [id number]         XR chest 1 view portable   ED Interpretation by Pat Davies MD (05/02 2149)   No acute cardiopulmonary disease identified                 Procedures  ECG 12 Lead Documentation Only    Date/Time: 5/2/2022 8:54 PM  Performed by: Pat Davies MD  Authorized by: Pat Davies MD     Indications / Diagnosis:  Seizure like activity  ECG reviewed by me, the ED Provider: yes    Patient location:  ED  Interpretation:     Interpretation: non-specific    Rate:     ECG rate:  89    ECG rate assessment: normal    Rhythm:     Rhythm: sinus rhythm    Ectopy:     Ectopy: none    QRS:     QRS axis:  Normal    QRS intervals: Normal  Conduction:     Conduction: normal    ST segments:     ST segments:  Normal  T waves:     T waves: normal               ED Course  ED Course as of 05/02/22 2255   Mon May 02, 2022   2108 Heart rate recorded as 46 by triage  Heart rate consistently 80s to 90s in the room, intermittently bigeminy, likely resulting in initial bradycardic reading  MDM  Number of Diagnoses or Management Options  Seizure-like activity [facility]): new and requires workup  Diagnosis management comments: Spell of abnormal behavior, upper body shaking, 60 seconds with no clear postictal period  Differential broad  Patient currently at his baseline  No tongue biting  No loss of bladder function  Currently at his baseline, patient with no acute complaints  EKG, CT, chest x-ray, labs unremarkable  Will admit on observation telemetry due to possible near syncopal episode, possible arrhythmia causing symptoms, possible seizure         Amount and/or Complexity of Data Reviewed  Clinical lab tests: ordered and reviewed  Tests in the radiology section of CPT®: ordered and reviewed  Tests in the medicine section of CPT®: ordered and reviewed  Obtain history from someone other than the patient: yes  Discuss the patient with other providers: yes    Risk of Complications, Morbidity, and/or Mortality  Presenting problems: high  Diagnostic procedures: moderate  Management options: high    Patient Progress  Patient progress: stable      Disposition  Final diagnoses:   Seizure-like activity ([address] )     Time reflects when diagnosis was documented in both MDM as applicable and the Disposition within this note     Time User Action Codes Description Comment    5/2/2022 10:40 PM Dearl Dry Add [R56 9] Seizure-like activity [facility])       ED Disposition     ED Disposition Condition Date/Time Comment    Admit Stable Mon May 2, 2022 10:41 PM Case was discussed with MARLENY and the patient's admission status was agreed to be Admission Status: observation status to the service of Dr Torie Puente   Follow-up Information    None         Patient's Medications   Discharge Prescriptions    No medications on file       No discharge procedures on file      PDMP Review     None          ED Provider  Electronically Signed by           Ralph Hobbs MD  05/02/22 3839

## 2022-05-03 NOTE — ASSESSMENT & PLAN NOTE
Lab Results   Component Value Date    HGBA1C 8 4 (H) 12/14/2021       No results for input(s): POCGLU in the last 72 hours      Blood Sugar Average: Last 72 hrs:  Pt takes metformin at home, hold home med  SSI while inpatient  Accuchecks

## 2022-05-03 NOTE — CONSULTS
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 -patient will be at higher risk of developing delirium recommend delirium precautions per geriatric protocol  Delirium precautions  -Patient is high risk of delirium due to dementia  -Initiate delirium precautions  -maintain normal sleep/wake cycle  -minimize overnight interruptions, group overnight vitals/labs/nursing checks as possible  -dim lights, close blinds and turn off tv to minimize stimulation and encourage sleep environment in evenings  -ensure that pain is well controlled  consider Tylenol 975mg Q8H scheduled if not already ordered   -monitor for fecal and urinary retention which may precipitate delirium  -encourage early mobilization and ambulation  -provide frequent reorientation and redirection  -encourage family and friends at the bedside to help help calm patient if anxious  -avoid medications which may precipitate or worsen delirium such as tramadol, benzodiazepine, anticholinergics, and benadryl  -encourage hydration and nutrition   -redirect unwanted behaviors as first line, avoid physical restraints, use chemical restraint only if all other attempts have been unsuccessful, would consider Zyprexa 2 5 mg, monitor for orthostatic hypotension and QTc prolongation with repeat dosing, recommend lowest dose possible for shortest duration possible     2 -diabetes mellitus type 2  -patient's last hemoglobin A1c was 8 5 would recommend increasing patient's metformin to 1000 mg orally twice a day  3  -hypothyroidism  -patient's TSH is 0 200 and free T4 is 1 50  Patient is hyperthyroid at present  Currently he was taking at home 125 mcg of levothyroxine have decreased his dosage to 112 mcg    Will need a follow-up TSH in 4-6 weeks  4 -leukocytosis  -patient with a white count of 14,120 will need to rule out an underlying urinary tract infection  Patient has some dysuria  5 -history of urinary and stool incontinence    6 -ambulatory dysfunction  -patient needs a walker with front wheels at all times in order to ambulate  Recommend getting physical and occupational therapy  7 -shaking episode -need to rule out an underlying infection given the fact that he has leukocytosis  No history of coughing some dysuria noted will need to get a urine analysis and culture  8  -anemia normochromic normocytic     Recommendations    1 -have decreased levothyroxine to 112 mcg orally daily    2  -UA CS    3  -monitor CBC with diff    4 -adjust diabetic medication as an outpatient would suggest increasing his metformin to 1000 mg orally twice daily  History of Present Illness   Physician Requesting Consult: Estefany Mg DO  Reason for Consult / Principal Problem:  Rigors  Hx and PE limited by:  No limitations  Additional history obtained from:  Daughter who was present at the time of evaluation  HPI: Bryson Murguia is a 82y o  year old male who presents with with a history of shaking chills  The patient has been living with his daughter for the last 4 years after the death of his wife  He lives in the up stairs environment where he has his bedroom and bathroom  Daughter states that he no longer uses the steps to go down because of his osteoarthritis of knees  The patient has history of cognitive impairment that the daughter has noticed for the past 4 years but she suspects that he had the issue when he was living with his wife  Patient has good recall of past memories but has difficulty with recent memory  The patient has history of urinary and stool incontinence  Patient wears depends at all times    Patient's other comorbidities include a history of diabetes mellitus type 2 not well controlled at present last hemoglobin A1c was 8 5 back in December of 2021  Hypothyroidism but his TSH is low and his free T4 is elevated suspect that his medication will need to be adjusted  Dementia most likely secondary to Alzheimer's  He also presents with some leukocytosis and had evidence of burning on urination  Patient will need to be straight cathed for urine analysis and culture  Reason for patient coming to the ER was the fact that he developed a shaking episode  Daughter is unsure whether he had fever at the time  Review of Systems   HENT: Negative  Eyes: Negative  Respiratory: Negative  Cardiovascular: Negative  Gastrointestinal:        Patient with history of urinary incontinence  Endocrine:        Patient with history of diabetes mellitus type 2 and hypothyroidism   Genitourinary:        Patient with history of urinary incontinence   Musculoskeletal: Positive for arthralgias  Patient mainly has increased arthritis of his knees   Skin: Negative  Neurological: Negative  Hematological: Negative  Psychiatric/Behavioral: Positive for confusion and decreased concentration  Memory/Cognitive screening:  Patient with history of dementia most likely Alzheimer's present for at least 4 years  Mobility:  Patient is able to ambulate with the assistance of a walker has a shuffling gait  Falls:  No history of recent falls  Assistive Devices:  Patient uses walker with front wheels  Fraility:  No evidence of frailty  Nutrition/weight loss/grocery shopping/meal preparation:  Patient's appetite has been excellent  Vision impairment:  Patient's vision improved after he had his cataract surgery  Hearing impairment:  No evidence of hearing impairment  Incontinence:  Patient has history of urinary and stool incontinence  Delirium:  No previous history of delirium  Polypharmacy:   No current facility-administered medications on file prior to encounter       Current Outpatient Medications on File Prior to Encounter   Medication Sig Dispense Refill    aspirin (ECOTRIN LOW STRENGTH) 81 mg EC tablet Take 81 mg by mouth every morning      atorvastatin (LIPITOR) 20 mg tablet Take 1 tablet (20 mg total) by mouth daily 90 tablet 2    ergocalciferol (ERGOCALCIFEROL) 1 25 MG (96203 UT) capsule Take 50,000 Units by mouth every 30 (thirty) days Every 15th      levothyroxine 125 mcg tablet Take 125 mcg by mouth every morning      metFORMIN (GLUCOPHAGE) 1000 MG tablet Take 1,000 mg by mouth daily with breakfast        senna (Senexon) 8 6 MG tablet Take 1 tablet by mouth as needed       tamsulosin (FLOMAX) 0 4 mg Take 1 capsule (0 4 mg total) by mouth daily with dinner 90 capsule 3     Patients primary residence:  Patient lives with daughter in daughter's home Lives with:  Daughter  iADL's:  Patient does not enjoy his independent activities of daily living  ADL's:  Patient does not enjoy his basic activities of daily living  Daughter has to dress him up every day  Historical Information   Past medical history:   Past Medical History:   Diagnosis Date    Allergic     Lactose: Shellfish    Asthma     Dementia ([facility] )     Diabetes mellitus ([facility] )     Diverticulitis of colon     Glaucoma     Memory loss      Past surgical history:   Past Surgical History:   Procedure Laterality Date    EYE SURGERY      TESTICLE SURGERY       Social history:  Patient had 3 daughters did not smoke or drink he has been  for the past 4 years  Social History     Socioeconomic History    Marital status:       Spouse name: Not on file    Number of children: 3    Years of education: Not on file    Highest education level: Not on file   Occupational History    Occupation: retired    Tobacco Use    Smoking status: Never Smoker    Smokeless tobacco: Never Used   Vaping Use    Vaping Use: Never used   Substance and Sexual Activity    Alcohol use: Not Currently     Alcohol/week: 0 0 standard drinks    Drug use: Never    Sexual activity: Not Currently     Partners: Female     Birth control/protection: Female Sterilization   Other Topics Concern    Not on file   Social History Narrative    Not on file     Social Determinants of Health     Financial Resource Strain: Not on file   Food Insecurity: No Food Insecurity    Worried About Running Out of Food in the Last Year: Never true    Dipak of Food in the Last Year: Never true   Transportation Needs: No Transportation Needs    Lack of Transportation (Medical): No    Lack of Transportation (Non-Medical): No   Physical Activity: Not on file   Stress: Not on file   Social Connections: Not on file   Intimate Partner Violence: Not on file   Housing Stability: Low Risk     Unable to Pay for Housing in the Last Year: No    Number of Places Lived in the Last Year: 1    Unstable Housing in the Last Year: No     Family history:   Family History   Problem Relation Age of Onset    No Known Problems Mother     No Known Problems Father     No Known Problems Sister     No Known Problems Brother     No Known Problems Daughter     No Known Problems Sister     No Known Problems Sister     No Known Problems Brother     No Known Problems Brother     No Known Problems Brother     No Known Problems Brother     No Known Problems Daughter     No Known Problems Daughter     Colon cancer Neg Hx        Meds/Allergies   All current active meds have been reviewed  Allergies   Allergen Reactions    Lactose - Food Allergy     Shellfish-Derived Products - Food Allergy Diarrhea       Objective   Vitals:    05/03/22 0800   BP: 144/63   Pulse: 62   Resp: 16   Temp:    SpO2: 98%     No intake or output data in the 24 hours ending 05/03/22 1139  Invasive Devices  Report    Peripheral Intravenous Line            Peripheral IV 05/02/22 Right Antecubital <1 day                Physical Exam  HENT:      Head: Normocephalic and atraumatic        Right Ear: Ear canal and external ear normal       Left Ear: Ear canal and external ear normal       Nose: Nose normal       Mouth/Throat:      Mouth: Mucous membranes are moist       Pharynx: Oropharynx is clear  Eyes:      Conjunctiva/sclera: Conjunctivae normal       Pupils: Pupils are equal, round, and reactive to light  Cardiovascular:      Rate and Rhythm: Normal rate and regular rhythm  Pulses: Normal pulses  Pulmonary:      Effort: Pulmonary effort is normal       Breath sounds: Normal breath sounds  Abdominal:      General: Abdomen is flat  Bowel sounds are normal    Musculoskeletal:         General: Normal range of motion  Cervical back: Normal range of motion  Skin:     General: Skin is warm  Neurological:      Mental Status: He is alert  Mental status is at baseline  He is disoriented  Psychiatric:      Comments: Patient with marked cognitive impairment         Lab Results:   I have personally reviewed pertinent lab and imaging results  VTE Prophylaxis:  Heparin 5000 units subQ every 8 hours    Code Status: Level 1 - Full Code  Advance Directive and Living Will:      Power of : Yes  POLST:      Family and Social Support:  He has excellent support from his daughter  Living Arrangements: Lives w/ Daughter  Support Systems: Daughter; Home care staff  Assistance Needed:  Total assist  Type of Current Residence: Private residence  [address]: Yes  Type of Current Home Care Services: Home health aide  Discharge planning discussed with[de-identified] daughter Gypsy Devens of Choice: Yes

## 2022-05-03 NOTE — ASSESSMENT & PLAN NOTE
Continue home aspirin  CT head shows no acute findings of infarct or hemmhorrage on final read; findings suggest NPH if clinical correlation  Difficult to correlate due to pt's altered mentation due to dementia at baseline, gait dysfunction at baseline, and urinary and fecal incontinence at baseline   No acute changes in the above per daughter  MRI on hold, unable to lay still  Neurology consulted, appreciate recommendation

## 2022-05-03 NOTE — CASE MANAGEMENT
Case Management Assessment & Discharge Planning Note    Patient name Frandy Riddle  Location ED 11/ED 11 MRN [id number]  : 1935 Date 5/3/2022       Current Admission Date: 2022  Current Admission Diagnosis:Tremor of both hands   Patient Active Problem List    Diagnosis Date Noted    Tremor of both hands 2022    BPH (benign prostatic hyperplasia) 2022    Dehydration 2022    Cortical age-related cataract of both eyes 2021    Bradycardia 2021    Urinary incontinence 2020    Fecal incontinence 2020    Encounter for medication review 2020    Dementia without behavioral disturbance ([facility] [address] [facility] ) 2020    Diverticulosis 2020    Benign prostatic hyperplasia with weak urinary stream 2019    Mild intermittent asthma without complication 15/55/6587    Allergic reaction 2019    Ambulatory dysfunction 2019    Acquired hypothyroidism 2019    History of CVA (cerebrovascular accident) 2019    Type 2 diabetes mellitus without complication, without long-term current use of insulin ([facility] ) 2019    Mixed hyperlipidemia 2019      LOS (days): 0  Geometric Mean LOS (GMLOS) (days):   Days to GMLOS:     OBJECTIVE:              Current admission status: Observation       Preferred Pharmacy:   [facility] [address] RANKIN [address] [facility], [address] - [address] [facility] [address]  [address] [address] [address]  Phone: [phone] Fax: [address] [facility], [facility] [address] [place] [address] [address] [address]  Phone: [phone] Fax: [phone]    [address] [place], [address] [facility]  [address] [facility] [address] [address]  Phone: [phone] Fax: [facility], [address]  [address] [address]  Phone: [phone] Fax: [phone]    Primary Care Provider: Yvonne Virk DO    Primary Insurance: [address] [place] [address]  Secondary Insurance:     ASSESSMENT:  [address], [address] - Daughter   Primary Phone: [phone] (Mobile)  Home Phone: [phone]               Readmission Root Cause  30 Day Readmission: No    Patient Information  Admitted from[de-identified] Home  Mental Status: Confused,Alert  During Assessment patient was accompanied by: Not accompanied during assessment  Assessment information provided by[de-identified] Daughter  Primary Caregiver: Child (lives with daughter Carmel Vaughan, has private care through [facility])  [facility] Name[de-identified] Tish Juarez Relationship to Patient[de-identified] Family Member  Caregiver's Telephone Number[de-identified] [phone]  Support Systems: Daughter,Home care staff  [place] of Residence: [address] [place] [address] do you live in?: [address]  Type of Current Residence:  (private residence   Resides with daughter Carmel Vaughan)  In the last 12 months, was there a time when you were not able to pay the mortgage or rent on time?: No  In the last 12 months, how many places have you lived?: 1  In the last 12 months, was there a time when you did not have a steady place to sleep or slept in a shelter (including now)?: No  Homeless/housing insecurity resource given?: N/A  Living Arrangements: Lives w/ Daughter  Is patient a ?: No    Activities of Daily Living Prior to Admission  Functional Status: Assistance  Completes ADLs independently?: No  Level of ADL dependence: Assistance  Ambulates independently?: No  Level of ambulatory dependence: Assistance  Does patient use assisted devices?: Yes  Assisted Devices (DME) used: [employer]  Does patient currently own DME?: Yes  What DME does the patient currently own?: Jhony Pineda  Does patient have a history of Outpatient Therapy (PT/OT)?: No  Does the patient have a history of Short-Term Rehab?: Yes [employer])  Does patient have a history of HHC?: Yes (Services through the [facility] and [facility])  Does patient currently have [address]?: Yes    Current Home Health Care  Type of Current Home Care Services: Home health aide  Current Home Health Agency[de-identified] Other (please enter name in comment) ([facility])  [facility] Provider[de-identified] PCP    Patient Information Continued  Income Source: SSI/SSD  Does patient have prescription coverage?: Yes  Within the past 12 months, you worried that your food would run out before you got the money to buy more : Never true  Within the past 12 months, the food you bought just didnt last and you didnt have money to get more : Never true  Food insecurity resource given?: N/A  Does patient receive dialysis treatments?: No  Does patient have a history of substance abuse?: No  Does patient have a history of Mental Health Diagnosis?: No    PHQ 2/9 Screening   Reviewed PHQ 2/9 Depression Screening Score?: No    Means of Transportation  Means of Transport to Appts[de-identified] Other (Comment) (Daughter reports that all services come to the home)  In the past 12 months, has lack of transportation kept you from medical appointments or from getting medications?: No  In the past 12 months, has lack of transportation kept you from meetings, work, or from getting things needed for daily living?: No  Was application for public transport provided?: N/A        DISCHARGE DETAILS:    Discharge planning discussed with[de-identified] daughter Luisa Kurtz of Choice: Yes  Comments - Freedom of Choice: CM spoke to daughter Alvin Hess  CM name and role introduced  Daughter reports that both she and her sister Keegan Leger share [place]  Omer Kenny reports that patient lives with her and requires assistance for ADLs, needs assistance to stand and then can ambulate with a RW  DME includes a walker, wheelchair and shower bench but daughter reports that patient gets a bed bath instead of getting in the shower  Patient has dementia at baseline   Daughter provides assistance and patient received services through the [employer]  Is current with [facility] for home health aides  Medications are delivered to the home; daughter reports that most doctor appointments come to the home as well through [employer]  [facility] of [facility] at [facility]  Denies D&A, denies mental health history  Patient is still in the ED  CM will continue to follow and assist through discharge    CM contacted family/caregiver?: Yes  Were Treatment Team discharge recommendations reviewed with patient/caregiver?: Yes  Did patient/caregiver verbalize understanding of patient care needs?: Yes  Were patient/caregiver advised of the risks associated with not following Treatment Team discharge recommendations?: Yes    Contacts  Patient Contacts: Christian Pretty  Relationship to Patient[de-identified] Family  Contact Method: Phone  Phone Number: [phone]  Reason/Outcome: Continuity of [address]          Is the patient interested in [facility] at discharge?: No (Is current with services from [facility])    DME Referral Provided  Referral made for DME?: No

## 2022-05-04 ENCOUNTER — APPOINTMENT (INPATIENT)
Dept: NEUROLOGY | Facility: HOSPITAL | Age: 87
DRG: 092 | End: 2022-05-04
Payer: MEDICARE

## 2022-05-04 PROBLEM — I10 HYPERTENSION: Status: ACTIVE | Noted: 2022-05-04

## 2022-05-04 LAB
ANION GAP SERPL CALCULATED.3IONS-SCNC: 6 MMOL/L (ref 4–13)
BUN SERPL-MCNC: 19 MG/DL (ref 5–25)
CALCIUM SERPL-MCNC: 8.3 MG/DL (ref 8.4–10.2)
CHLORIDE SERPL-SCNC: 108 MMOL/L (ref 96–108)
CO2 SERPL-SCNC: 25 MMOL/L (ref 21–32)
CREAT SERPL-MCNC: 0.65 MG/DL (ref 0.6–1.3)
ERYTHROCYTE [DISTWIDTH] IN BLOOD BY AUTOMATED COUNT: 13.3 % (ref 11.6–15.1)
GFR SERPL CREATININE-BSD FRML MDRD: 88 ML/MIN/1.73SQ M
GLUCOSE SERPL-MCNC: 118 MG/DL (ref 65–140)
GLUCOSE SERPL-MCNC: 126 MG/DL (ref 65–140)
GLUCOSE SERPL-MCNC: 142 MG/DL (ref 65–140)
GLUCOSE SERPL-MCNC: 166 MG/DL (ref 65–140)
GLUCOSE SERPL-MCNC: 192 MG/DL (ref 65–140)
HCT VFR BLD AUTO: 32 % (ref 36.5–49.3)
HGB BLD-MCNC: 10.6 G/DL (ref 12–17)
MCH RBC QN AUTO: 31.2 PG (ref 26.8–34.3)
MCHC RBC AUTO-ENTMCNC: 33.1 G/DL (ref 31.4–37.4)
MCV RBC AUTO: 94 FL (ref 82–98)
PLATELET # BLD AUTO: 153 THOUSANDS/UL (ref 149–390)
PMV BLD AUTO: 9.8 FL (ref 8.9–12.7)
POTASSIUM SERPL-SCNC: 3.7 MMOL/L (ref 3.5–5.3)
RBC # BLD AUTO: 3.4 MILLION/UL (ref 3.88–5.62)
SODIUM SERPL-SCNC: 139 MMOL/L (ref 135–147)
WBC # BLD AUTO: 6.43 THOUSAND/UL (ref 4.31–10.16)

## 2022-05-04 PROCEDURE — 95816 EEG AWAKE AND DROWSY: CPT

## 2022-05-04 PROCEDURE — 80048 BASIC METABOLIC PNL TOTAL CA: CPT | Performed by: INTERNAL MEDICINE

## 2022-05-04 PROCEDURE — 99233 SBSQ HOSP IP/OBS HIGH 50: CPT | Performed by: GENERAL PRACTICE

## 2022-05-04 PROCEDURE — 85027 COMPLETE CBC AUTOMATED: CPT | Performed by: INTERNAL MEDICINE

## 2022-05-04 PROCEDURE — 82948 REAGENT STRIP/BLOOD GLUCOSE: CPT

## 2022-05-04 PROCEDURE — 99232 SBSQ HOSP IP/OBS MODERATE 35: CPT | Performed by: PSYCHIATRY & NEUROLOGY

## 2022-05-04 PROCEDURE — 95816 EEG AWAKE AND DROWSY: CPT | Performed by: STUDENT IN AN ORGANIZED HEALTH CARE EDUCATION/TRAINING PROGRAM

## 2022-05-04 RX ORDER — AMLODIPINE BESYLATE 2.5 MG/1
2.5 TABLET ORAL DAILY
Status: DISCONTINUED | OUTPATIENT
Start: 2022-05-04 | End: 2022-05-05 | Stop reason: HOSPADM

## 2022-05-04 RX ADMIN — ATORVASTATIN CALCIUM 20 MG: 20 TABLET, FILM COATED ORAL at 09:15

## 2022-05-04 RX ADMIN — POLYETHYLENE GLYCOL 3350 17 G: 17 POWDER, FOR SOLUTION ORAL at 09:14

## 2022-05-04 RX ADMIN — TAMSULOSIN HYDROCHLORIDE 0.4 MG: 0.4 CAPSULE ORAL at 17:15

## 2022-05-04 RX ADMIN — HEPARIN SODIUM 5000 UNITS: 5000 INJECTION INTRAVENOUS; SUBCUTANEOUS at 14:30

## 2022-05-04 RX ADMIN — HEPARIN SODIUM 5000 UNITS: 5000 INJECTION INTRAVENOUS; SUBCUTANEOUS at 05:47

## 2022-05-04 RX ADMIN — SODIUM CHLORIDE, SODIUM GLUCONATE, SODIUM ACETATE, POTASSIUM CHLORIDE, MAGNESIUM CHLORIDE, SODIUM PHOSPHATE, DIBASIC, AND POTASSIUM PHOSPHATE 100 ML/HR: .53; .5; .37; .037; .03; .012; .00082 INJECTION, SOLUTION INTRAVENOUS at 02:42

## 2022-05-04 RX ADMIN — AMLODIPINE BESYLATE 2.5 MG: 2.5 TABLET ORAL at 09:44

## 2022-05-04 RX ADMIN — CEFTRIAXONE SODIUM 1000 MG: 10 INJECTION, POWDER, FOR SOLUTION INTRAVENOUS at 09:12

## 2022-05-04 RX ADMIN — INSULIN LISPRO 1 UNITS: 100 INJECTION, SOLUTION INTRAVENOUS; SUBCUTANEOUS at 11:55

## 2022-05-04 RX ADMIN — LEVOTHYROXINE SODIUM 112 MCG: 112 TABLET ORAL at 05:50

## 2022-05-04 RX ADMIN — ASPIRIN 81 MG: 81 TABLET ORAL at 09:14

## 2022-05-04 RX ADMIN — HEPARIN SODIUM 5000 UNITS: 5000 INJECTION INTRAVENOUS; SUBCUTANEOUS at 21:33

## 2022-05-04 RX ADMIN — SODIUM CHLORIDE, SODIUM GLUCONATE, SODIUM ACETATE, POTASSIUM CHLORIDE, MAGNESIUM CHLORIDE, SODIUM PHOSPHATE, DIBASIC, AND POTASSIUM PHOSPHATE 100 ML/HR: .53; .5; .37; .037; .03; .012; .00082 INJECTION, SOLUTION INTRAVENOUS at 14:51

## 2022-05-04 NOTE — PLAN OF CARE
Problem: Potential for Falls  Goal: Patient will remain free of falls  Description: INTERVENTIONS:  - Educate patient/family on patient safety including physical limitations  - Instruct patient to call for assistance with activity   - Consult OT/PT to assist with strengthening/mobility   - Keep Call bell within reach  - Keep bed low and locked with side rails adjusted as appropriate  - Keep care items and personal belongings within reach  - Initiate and maintain comfort rounds  - Make Fall Risk Sign visible to staff  - Offer Toileting every  Hours, in advance of need  - Initiate/Maintain alarm  - Obtain necessary fall risk management equipment:   - Apply yellow socks and bracelet for high fall risk patients  - Consider moving patient to room near nurses station  Outcome: Progressing     Problem: MOBILITY - ADULT  Goal: Maintain or return to baseline ADL function  Description: INTERVENTIONS:  -  Assess patient's ability to carry out ADLs; assess patient's baseline for ADL function and identify physical deficits which impact ability to perform ADLs (bathing, care of mouth/teeth, toileting, grooming, dressing, etc )  - Assess/evaluate cause of self-care deficits   - Assess range of motion  - Assess patient's mobility; develop plan if impaired  - Assess patient's need for assistive devices and provide as appropriate  - Encourage maximum independence but intervene and supervise when necessary  - Involve family in performance of ADLs  - Assess for home care needs following discharge   - Consider OT consult to assist with ADL evaluation and planning for discharge  - Provide patient education as appropriate  Outcome: Progressing  Goal: Maintains/Returns to pre admission functional level  Description: INTERVENTIONS:  - Perform BMAT or MOVE assessment daily    - Set and communicate daily mobility goal to care team and patient/family/caregiver     - Collaborate with rehabilitation services on mobility goals if consulted  - Perform Range of Motion  times a day  - Reposition patient every  hours    - Dangle patient  times a day  - Stand patient  times a day  - Ambulate patient  times a day  - Out of bed to chair  times a day   - Out of bed for meals  times a day  - Out of bed for toileting  - Record patient progress and toleration of activity level   Outcome: Progressing     Problem: Prexisting or High Potential for Compromised Skin Integrity  Goal: Skin integrity is maintained or improved  Description: INTERVENTIONS:  - Identify patients at risk for skin breakdown  - Assess and monitor skin integrity  - Assess and monitor nutrition and hydration status  - Monitor labs   - Assess for incontinence   - Turn and reposition patient  - Assist with mobility/ambulation  - Relieve pressure over bony prominences  - Avoid friction and shearing  - Provide appropriate hygiene as needed including keeping skin clean and dry  - Evaluate need for skin moisturizer/barrier cream  - Collaborate with interdisciplinary team   - Patient/family teaching  - Consider wound care consult   Outcome: Progressing     Problem: PAIN - ADULT  Goal: Verbalizes/displays adequate comfort level or baseline comfort level  Description: Interventions:  - Encourage patient to monitor pain and request assistance  - Assess pain using appropriate pain scale  - Administer analgesics based on type and severity of pain and evaluate response  - Implement non-pharmacological measures as appropriate and evaluate response  - Consider cultural and social influences on pain and pain management  - Notify physician/advanced practitioner if interventions unsuccessful or patient reports new pain  Outcome: Progressing     Problem: INFECTION - ADULT  Goal: Absence or prevention of progression during hospitalization  Description: INTERVENTIONS:  - Assess and monitor for signs and symptoms of infection  - Monitor lab/diagnostic results  - Monitor all insertion sites, i e  indwelling lines, tubes, and drains  - Monitor endotracheal if appropriate and nasal secretions for changes in amount and color  - [place] appropriate cooling/warming therapies per order  - Administer medications as ordered  - Instruct and encourage patient and family to use good hand hygiene technique  - Identify and instruct in appropriate isolation precautions for identified infection/condition  Outcome: Progressing  Goal: Absence of fever/infection during neutropenic period  Description: INTERVENTIONS:  - Monitor WBC    Outcome: Progressing     Problem: SAFETY ADULT  Goal: Patient will remain free of falls  Description: INTERVENTIONS:  - Educate patient/family on patient safety including physical limitations  - Instruct patient to call for assistance with activity   - Consult OT/PT to assist with strengthening/mobility   - Keep Call bell within reach  - Keep bed low and locked with side rails adjusted as appropriate  - Keep care items and personal belongings within reach  - Initiate and maintain comfort rounds  - Make Fall Risk Sign visible to staff  - Offer Toileting every  Hours, in advance of need  - Initiate/Maintain alarm  - Obtain necessary fall risk management equipment:   - Apply yellow socks and bracelet for high fall risk patients  - Consider moving patient to room near nurses station  Outcome: Progressing  Goal: Maintain or return to baseline ADL function  Description: INTERVENTIONS:  -  Assess patient's ability to carry out ADLs; assess patient's baseline for ADL function and identify physical deficits which impact ability to perform ADLs (bathing, care of mouth/teeth, toileting, grooming, dressing, etc )  - Assess/evaluate cause of self-care deficits   - Assess range of motion  - Assess patient's mobility; develop plan if impaired  - Assess patient's need for assistive devices and provide as appropriate  - Encourage maximum independence but intervene and supervise when necessary  - Involve family in performance of ADLs  - Assess for home care needs following discharge   - Consider OT consult to assist with ADL evaluation and planning for discharge  - Provide patient education as appropriate  Outcome: Progressing  Goal: Maintains/Returns to pre admission functional level  Description: INTERVENTIONS:  - Perform BMAT or MOVE assessment daily    - Set and communicate daily mobility goal to care team and patient/family/caregiver  - Collaborate with rehabilitation services on mobility goals if consulted  - Perform Range of Motion  times a day  - Reposition patient every  hours  - Dangle patient  times a day  - Stand patient  times a day  - Ambulate patient  times a day  - Out of bed to chair  times a day   - Out of bed for meals times a day  - Out of bed for toileting  - Record patient progress and toleration of activity level   Outcome: Progressing     Problem: DISCHARGE PLANNING  Goal: Discharge to home or other facility with appropriate resources  Description: INTERVENTIONS:  - Identify barriers to discharge w/patient and caregiver  - Arrange for needed discharge resources and transportation as appropriate  - Identify discharge learning needs (meds, wound care, etc )  - Arrange for interpretive services to assist at discharge as needed  - Refer to Case Management Department for coordinating discharge planning if the patient needs post-hospital services based on physician/advanced practitioner order or complex needs related to functional status, cognitive ability, or social support system  Outcome: Progressing     Problem: Knowledge Deficit  Goal: Patient/family/caregiver demonstrates understanding of disease process, treatment plan, medications, and discharge instructions  Description: Complete learning assessment and assess knowledge base    Interventions:  - Provide teaching at level of understanding  - Provide teaching via preferred learning methods  Outcome: Progressing

## 2022-05-04 NOTE — ASSESSMENT & PLAN NOTE
Mucus membranes appear moist  Encourage PO intake of fluids  Received 1L plasmalyte-A/isolyte-S IV solution

## 2022-05-04 NOTE — ASSESSMENT & PLAN NOTE
Patient heart rate as low as 40s and 50s, BP spikes high as 200/90  No history of HTN or HTN medications    Plan:  · Amlodipine 2 5mg QD

## 2022-05-04 NOTE — ASSESSMENT & PLAN NOTE
Pt with history of dependence on assistance for walking  Daughter reports pt walks at home with walker and assistance, however mainly sits in wheelchair; she denies acute change    Plan:  · Fall precautions  · Pt should only be out of bed with assistance  · PT/OT: Anthony Wang

## 2022-05-04 NOTE — PROGRESS NOTES
NEUROLOGY RESIDENCY PROGRESS NOTE     Name: Timoteo Franco   Age & Sex: 82 y o  male   MRN: [id number]  Unit/Bed#: S -01   Encounter: [id number]    Recommendations for outpatient neurological follow up have yet to be determined  Pending workup: EEG    ASSESSMENT & PLAN     Dementia without behavioral disturbance [facility])  Assessment & Plan  Patient is an 68-year-old male with history of dementia, diabetes, hypothyroidism hyperlipidemia and CVA who presents with seizure-like activity  Daughter describes a transient episode of bilateral upper extremity flapping tremor  No evidence of a tonic phase preceding this  No report of tongue bite or urinary incontinence  No postictal state  Patient does have a poor baseline with minimal orientation and awareness  He is dependent on ADLs and uses diapers  Daughter states that he has increased tone at baseline  On arrival to the ED /97  CTH wo any acute intracranial abnormality  Unchanged ventriculomegaly  Possible superimposed hydrocephalus  TSH 0 200 with T4 1 50  WBC initially 9 89 and up trended to 14  12  Physical exam at baseline  Impression:  Likely deconditioning  Possible tremor exacerbated metabolic disturbances including supratherapeutic levothyroxine and UTI  Seizure and stroke lower on the differential     Plan:  · Routine EEG pending  · Hold MRI for the moment, patient may not be able to tolerate given mental status  · Adjust levothyroxine and monitor TSH  · Downtrending WBC  · U/A positive for bacteria  · Continue aspirin and statin  · Appreciate geriatric medicine consult  · Management of metabolic disturbances by primary team  · Delirium precautions  · Call or notify Neurology if any recurrent seizure-like activity        SUBJECTIVE     Patient was seen and examined  No acute events overnight  No reports of seizure activity by nursing  Patient states he is feeling well  Denies any headache           Review of Systems   Unable to perform ROS: Dementia       OBJECTIVE     Patient ID: Leah Joel is a 82 y o  male  Vitals:    22 1731 22 1756 22 2157 22 0717   BP: (!) 202/132 164/72 162/75 144/67   BP Location: Right arm  Left arm    Pulse: 63 61 63 (!) 54   Resp: 18  19 18   Temp: 98 1 °F (36 7 °C)  98 2 °F (36 8 °C) 97 7 °F (36 5 °C)   TempSrc: Oral  Axillary    SpO2: 98% 97% 97% 98%   Weight:       Height:          Temperature:   Temp (24hrs), Av °F (36 7 °C), Min:97 7 °F (36 5 °C), Max:98 2 °F (36 8 °C)    Temperature: 97 7 °F (36 5 °C)      Physical Exam  Neurological:      Deep Tendon Reflexes:      Reflex Scores:       Patellar reflexes are 2+ on the left side  Neurologic Exam     Mental Status   Patient inattentive  Does not answer questions appropriately  Smiles to social cues  Not oriented to place  Unable to correctly identify place with multiple choice   Able to feed himself once you put food on hand     Cranial Nerves   No facial asymmetry  No gaze deviation, normal EOM  Pupils equal and reactive  No tongue deviation  Blinks to threat  Difficulty following commands in the upper and lower extremities     Motor Exam Increased muscle tone in both upper and lower extremity  Cogwheeling at the wrist L>R         Sensory Exam   Withdraws to discomfort in bilateral lower extremities     Gait, Coordination, and Reflexes     Reflexes   Left patellar: 2+  Right plantar: normal  Left plantar: normal  Right ankle clonus: absent  Left ankle clonus: absentHigh-frequency low-amplitude tremor worse with action        LABORATORY DATA     Labs: I have personally reviewed pertinent reports      Results from last 7 days   Lab Units 22  0507 22  0507 22  2049   WBC Thousand/uL 6 43 14 12* 9 89   HEMOGLOBIN g/dL 10 6* 11 8* 12 7   HEMATOCRIT % 32 0* 35 6* 40 1   PLATELETS Thousands/uL 153 207 223   NEUTROS PCT %  --  85* 82*   MONOS PCT %  --  5 1*      Results from last 7 days   Lab Units 22  0506 05/03/22  0507 05/02/22  2049   SODIUM mmol/L 139 145 145   POTASSIUM mmol/L 3 7 3 6 4 0   CHLORIDE mmol/L 108 111* 108   CO2 mmol/L 25 28 30   BUN mg/dL 19 21 22   CREATININE mg/dL 0 65 0 82 0 97   CALCIUM mg/dL 8 3* 8 6 8 6   ALK PHOS U/L  --  66 81   ALT U/L  --  28 26   AST U/L  --  21 18     Results from last 7 days   Lab Units 05/03/22  0507 05/02/22  2049   MAGNESIUM mg/dL 1 7 1 7     Results from last 7 days   Lab Units 05/03/22  0507   PHOSPHORUS mg/dL 2 7      Results from last 7 days   Lab Units 05/02/22  2049   INR  1 15   PTT seconds 27               IMAGING & DIAGNOSTIC TESTING     Radiology Results: I have personally reviewed pertinent reports  CT head without contrast   Final Result by Bobbette Boxer, DO (05/02 2150)      Images degraded by motion artifact  No acute intracranial abnormality identified within the limitations of the examination  Pansinusitis again noted  Volume loss with unchanged ventriculomegaly, which could suggest mild superimposed hydrocephalus  Correlate for any clinical evidence of normal pressure hydrocephalus  Workstation performed: [id number]         XR chest 1 view portable   ED Interpretation by Josh Paiz MD (05/02 2149)   No acute cardiopulmonary disease identified      Final Result by Elsy Santos MD (05/03 0920)      No acute cardiopulmonary disease  Workstation performed: [id number]         MRI brain w wo contrast    (Results Pending)       Other Diagnostic Testing: I have personally reviewed pertinent reports        ACTIVE MEDICATIONS     Current Facility-Administered Medications   Medication Dose Route Frequency    acetaminophen (TYLENOL) tablet 650 mg  650 mg Oral Q6H PRN    amLODIPine (NORVASC) tablet 2 5 mg  2 5 mg Oral Daily    aspirin (ECOTRIN LOW STRENGTH) EC tablet 81 mg  81 mg Oral QAM    atorvastatin (LIPITOR) tablet 20 mg  20 mg Oral Daily    calcium carbonate (TUMS) chewable tablet 1,000 mg 1,000 mg Oral Daily PRN    cefTRIAXone (ROCEPHIN) 1,000 mg in dextrose 5 % 50 mL IVPB  1,000 mg Intravenous Q24H    heparin (porcine) subcutaneous injection 5,000 Units  5,000 Units Subcutaneous Q8H [facility]    insulin lispro (HumaLOG) 100 units/mL subcutaneous injection 1-5 Units  1-5 Units Subcutaneous TID AC    levothyroxine tablet 112 mcg  112 mcg Oral QAM    LORazepam (ATIVAN) injection 0 5 mg  0 5 mg Intravenous Once PRN    multi-electrolyte (PLASMALYTE-A/ISOLYTE-S PH 7 4) IV solution  100 mL/hr Intravenous Continuous    ondansetron (ZOFRAN) injection 4 mg  4 mg Intravenous Q6H PRN    polyethylene glycol (MIRALAX) packet 17 g  17 g Oral Daily    tamsulosin (FLOMAX) capsule 0 4 mg  0 4 mg Oral Daily With Dinner       Prior to Admission medications    Medication Sig Start Date End Date Taking?  Authorizing Provider   aspirin (ECOTRIN LOW STRENGTH) 81 mg EC tablet Take 81 mg by mouth every morning   Yes Historical Provider, MD   atorvastatin (LIPITOR) 20 mg tablet Take 1 tablet (20 mg total) by mouth daily 2/5/21  Yes Ty Bev, DO   ergocalciferol (ERGOCALCIFEROL) 1 25 MG (01455 UT) capsule Take 50,000 Units by mouth every 30 (thirty) days Every 15th 3/8/21  Yes Historical Provider, MD   levothyroxine 125 mcg tablet Take 125 mcg by mouth every morning   Yes Historical Provider, MD   metFORMIN (GLUCOPHAGE) 1000 MG tablet Take 1,000 mg by mouth daily with breakfast   9/26/21  Yes Historical Provider, MD   senna (Senexon) 8 6 MG tablet Take 1 tablet by mouth as needed  3/1/21  Yes Historical Provider, MD   tamsulosin (FLOMAX) 0 4 mg Take 1 capsule (0 4 mg total) by mouth daily with dinner 2/17/21  Yes JESSICA Wright         VTE Pharmacologic Prophylaxis: Heparin  VTE Mechanical Prophylaxis: sequential compression device    ==  MD [facility], PGY-3

## 2022-05-04 NOTE — ASSESSMENT & PLAN NOTE
5/4:WBCs down to 6 42 from 14 12  Pt wears adult diaper, increased risk for UTI  UA results show elevated WBCs, positive nitrites positive leukocytes than occasional bacteria    Plan:  · Ceftriaxone IV started on 05/04 Day 1

## 2022-05-04 NOTE — ASSESSMENT & PLAN NOTE
Longstanding history of dementia  Patient at risk for delirium given age and co-morbidities    Plan:  -Recommend global delirium precautions as follows:   -Establishment of day/night cycle via lights during the day and blinds open    -Please limit interruptions at night as medically appropriate   -Patient should be out of bed during the day as tolerated or medically indicated   -Provide glasses/hearing aids as appropriate   -Minimize deliriogenic meds (including, but not limited to, tramadol, benzodiazepines, anticholinergics, Benadryl)   -Provide reorientation including date on board and visible clock   -Treat pain as indicated via geriatric pain protocol  -Monitor for constipation and urinary retention   -Avoid restraints as able, frequent verbal reorientations or patient care sitter as appropriate   -Consider use of melatonin qHS for circadian rhythm maintenance  Fall precautions  Bedside swallow evaluation, aspiration precautions

## 2022-05-04 NOTE — PROGRESS NOTES
[facility]  Progress Note - Gabi Jones 1935, 82 y o  male MRN: [id number]  Unit/Bed#: S -01 Encounter: [id number]  Primary Care Provider: Vipul Jones DO   Date and time admitted to hospital: 5/2/2022  8:33 PM    * Tremor of both hands  Assessment & Plan  POA: Pt's daughter states that she witnessed her father take a sip of juice at the dinner table and then watched his hands start shaking and his arms began moving back and forth uncontrollably which lasted for a few minutes then resolved  Pt's daughter states that pt has baseline tremor but never that severe  CT head without contrast, Result Date: 5/2/2022  Impression: Images degraded by motion artifact  No acute intracranial abnormality identified within the limitations of the examination  Pansinusitis again noted  Volume loss with unchanged ventriculomegaly, which could suggest mild superimposed hydrocephalus  Correlate for any clinical evidence of normal pressure hydrocephalus     Magnesium 1 7  TSH low, elevated Free T4 on admission    Per Medical student assessment 5/4:  No resting tremor in UEsx2  LUE strength 3/5  RUE strength 1/5  LE strength 1/5 bilaterally  Suspicion for worsening baseline tremor d/t supratherapeutic levothyroxine dose, deconditioning, and/or possible infection, Need to r/o seizure and stroke    Plan:   Follow neuro recommendations:  · Await results of EEG to evaluate for seizure activity  · Hold MRI brain, patient may not tolerate d/t excess movement  · Decrease levothyroxine dose to 112mg QD, repeat TSH in 4-6 weeks outpatient    Leukocytosis  Assessment & Plan  5/4:WBCs down to 6 42 from 14 12  Pt wears adult diaper, increased risk for UTI  UA results show elevated WBCs, positive nitrites positive leukocytes than occasional bacteria    Plan:  · Ceftriaxone IV started on 05/04 Day 1    Hypertension  Assessment & Plan  Patient heart rate as low as 40s and 50s, BP spikes high as 200/90  No history of HTN or HTN medications    Plan:  · Amlodipine 2 5mg QD    Dehydration  Assessment & Plan  Mucus membranes appear moist  Encourage PO intake of fluids  Received 1L plasmalyte-A/isolyte-S IV solution    BPH (benign prostatic hyperplasia)  Assessment & Plan  Continue home flomax and monitor I/Os    Bradycardia  Assessment & Plan  POA: Pt had episode of bradycardia to 46bpm in ED triage   First EKG remarkable for bigeminy and was limited by artifact; follow up EKG was sinus rhythm with regular rate also limited by artifact  Telemetry reviewed and unremarkable, discontinued    Dementia without behavioral disturbance ([facility] [address] )  Assessment & Plan  Longstanding history of dementia  Patient at risk for delirium given age and co-morbidities    Plan:  -Recommend global delirium precautions as follows:   -Establishment of day/night cycle via lights during the day and blinds open    -Please limit interruptions at night as medically appropriate   -Patient should be out of bed during the day as tolerated or medically indicated   -Provide glasses/hearing aids as appropriate   -Minimize deliriogenic meds (including, but not limited to, tramadol, benzodiazepines, anticholinergics, Benadryl)   -Provide reorientation including date on board and visible clock   -Treat pain as indicated via geriatric pain protocol  -Monitor for constipation and urinary retention   -Avoid restraints as able, frequent verbal reorientations or patient care sitter as appropriate   -Consider use of melatonin qHS for circadian rhythm maintenance  Fall precautions  Bedside swallow evaluation, aspiration precautions    Mixed hyperlipidemia  Assessment & Plan  Continue home atorvastatin    Type 2 diabetes mellitus without complication, without long-term current use of insulin [facility])  Assessment & Plan  Lab Results   Component Value Date    HGBA1C 8 4 (H) 12/14/2021       Recent Labs     05/03/22  0718 05/03/22  1601 05/03/22  2143 05/04/22  0718   POCGLU 153* 216* 129 142*       Blood Sugar Average: Last 72 hrs:  · Pt takes metformin at home, hold home med  · SSI while inpatient  · Accuchecks  · Hypoglycemic protocol    History of CVA (cerebrovascular accident)  Assessment & Plan  Continue home aspirin  CT head shows no acute findings of infarct or hemmhorrage on final read; findings suggest NPH if clinical correlation  Difficult to correlate due to pt's altered mentation due to dementia at baseline, gait dysfunction at baseline, and urinary and fecal incontinence at baseline  No acute changes in the above per daughter  MRI on hold, unable to lay still  Neurology consulted, appreciate recommendation    Ambulatory dysfunction  Assessment & Plan  Pt with history of dependence on assistance for walking  Daughter reports pt walks at home with walker and assistance, however mainly sits in wheelchair; she denies acute change    Plan:  · Fall precautions  · Pt should only be out of bed with assistance  · PT[facility]OT: [facility]      VTE Pharmacologic Prophylaxis: VTE Score: 4 Moderate Risk (Score 3-4) - Pharmacological DVT Prophylaxis Ordered: heparin  Patient Centered Rounds: I performed bedside rounds with nursing staff today  Discussions with Specialists or Other Care Team Provider: N/A    Education and Discussions with Family / Patient: Updated  (daughter) via phone  Current Length of Stay: 1 day(s)  Current Patient Status: Inpatient   Discharge Plan: Anticipate discharge in 24-48 hrs to home with home services  Code Status: Level 1 - Full Code    Subjective: This AM pt AOx0, consistent with his baseline mental status  Patient appeared cheerful, engaged, and comfortable  Pt  Denied having any pain  No overnight events       Objective:     Vitals:   Temp (24hrs), Av °F (36 7 °C), Min:97 7 °F (36 5 °C), Max:98 2 °F (36 8 °C)    Temp:  [97 7 °F (36 5 °C)-98 2 °F (36 8 °C)] 97 7 °F (36 5 °C)  HR:  [54-63] 54  Resp:  [18-19] 18  BP: (144-202)/() 144/67  SpO2:  [97 %-98 %] 98 %  Body mass index is 28 02 kg/m²  Input and Output Summary (last 24 hours): Intake/Output Summary (Last 24 hours) at 5/4/2022 1146  Last data filed at 5/4/2022 0512  Gross per 24 hour   Intake 1000 ml   Output 300 ml   Net 700 ml       Physical Exam:   Physical Exam  Constitutional:       Appearance: Normal appearance  He is well-groomed  HENT:      Mouth/Throat:      Mouth: Mucous membranes are moist    Cardiovascular:      Rate and Rhythm: Regular rhythm  Pulmonary:      Effort: Pulmonary effort is normal       Breath sounds: Normal breath sounds  Neurological:      Mental Status: He is alert  Mental status is at baseline  Motor: Weakness present  Comments: LUE strength 3/5  RUE strength 1/5  LE strength 1/5 bilaterally    Did not appreciate a resting tremor in UEs, improvement from yesterdays low-amplitude resting tremor          Additional Data:     Labs:  Results from last 7 days   Lab Units 05/04/22  0507 05/03/22  0507 05/03/22  0507   WBC Thousand/uL 6 43   < > 14 12*   HEMOGLOBIN g/dL 10 6*   < > 11 8*   HEMATOCRIT % 32 0*   < > 35 6*   PLATELETS Thousands/uL 153   < > 207   NEUTROS PCT %  --   --  85*   LYMPHS PCT %  --   --  8*   MONOS PCT %  --   --  5   EOS PCT %  --   --  1    < > = values in this interval not displayed  Results from last 7 days   Lab Units 05/04/22  0506 05/03/22  0507 05/03/22  0507   SODIUM mmol/L 139   < > 145   POTASSIUM mmol/L 3 7   < > 3 6   CHLORIDE mmol/L 108   < > 111*   CO2 mmol/L 25   < > 28   BUN mg/dL 19   < > 21   CREATININE mg/dL 0 65   < > 0 82   ANION GAP mmol/L 6   < > 6   CALCIUM mg/dL 8 3*   < > 8 6   ALBUMIN g/dL  --   --  2 9*   TOTAL BILIRUBIN mg/dL  --   --  0 64   ALK PHOS U/L  --   --  66   ALT U/L  --   --  28   AST U/L  --   --  21   GLUCOSE RANDOM mg/dL 118   < > 164*    < > = values in this interval not displayed       Results from last 7 days   Lab Units 05/02/22 2049   INR  1 15     Results from last 7 days   Lab Units 05/04/22  1109 05/04/22  0718 05/03/22  2143 05/03/22  1601 05/03/22  0718   POC GLUCOSE mg/dl 192* 142* 129 216* 153*               Lines/Drains:  Invasive Devices  Report    Peripheral Intravenous Line            Peripheral IV 05/02/22 Right Antecubital 1 day                Imaging: No pertinent imaging reviewed  Recent Cultures (last 7 days):         Last 24 Hours Medication List:   Current Facility-Administered Medications   Medication Dose Route Frequency Provider Last Rate    acetaminophen  650 mg Oral Q6H PRN Tor Duel, DO      amLODIPine  2 5 mg Oral Daily Ayaan Acosta MD      aspirin  81 mg Oral QAM Tor Duel, DO      atorvastatin  20 mg Oral Daily Tor Duel, DO      calcium carbonate  1,000 mg Oral Daily PRN Tor Duel, DO      cefTRIAXone  1,000 mg Intravenous Q24H Ayaan Acosta MD 1,000 mg (05/04/22 0912)    heparin (porcine)  5,000 Units Subcutaneous [facility] Tor Duel, DO      insulin lispro  1-5 Units Subcutaneous TID AC Tor Duel, DO      levothyroxine  112 mcg Oral QAM Ashwini Adames MD      LORazepam  0 5 mg Intravenous Once PRN Tor Duel, DO      multi-electrolyte  100 mL/hr Intravenous Continuous Tor Duel,  mL/hr (05/04/22 0242)    ondansetron  4 mg Intravenous Q6H PRN Tor Duel, DO      polyethylene glycol  17 g Oral Daily Tor Duel, DO      tamsulosin  0 4 mg Oral Daily With Nila Poles, DO          Today, Patient Was Seen By: Krystina Ca MD    **Please Note: This note may have been constructed using a voice recognition system  **

## 2022-05-04 NOTE — CASE MANAGEMENT
Case Management Discharge Planning Note    Patient name Keegan Bedolla  Location S /S -01 MRN [id number]  : 1935 Date 2022       Current Admission Date: 2022  Current Admission Diagnosis:Tremor of both hands   Patient Active Problem List    Diagnosis Date Noted    Hypertension 2022    Tremor of both hands 2022    BPH (benign prostatic hyperplasia) 2022    Dehydration 2022    Leukocytosis 2022    Cortical age-related cataract of both eyes 2021    Bradycardia 2021    Urinary incontinence 2020    Fecal incontinence 2020    Encounter for medication review 2020    Dementia without behavioral disturbance ([facility] [address] ) 2020    Diverticulosis 2020    Benign prostatic hyperplasia with weak urinary stream 2019    Mild intermittent asthma without complication     Allergic reaction 2019    Ambulatory dysfunction 2019    Acquired hypothyroidism 2019    History of CVA (cerebrovascular accident) 2019    Type 2 diabetes mellitus without complication, without long-term current use of insulin ([address] ) 2019    Mixed hyperlipidemia 2019      LOS (days): 1  Geometric Mean LOS (GMLOS) (days): 2 60  Days to GMLOS:1 5     OBJECTIVE:  Risk of Unplanned Readmission Score: 16         Current admission status: Inpatient   Preferred Pharmacy:   [facility] [address]ANKIN [address]  [address] [place] [address]  Phone: [phone] Fax: [address] [place] [address] 110, [address] [facility] [address] [facility] [address] [place] [address]  Phone: [phone] Fax: [phone]    [address], [address] [facility] [address]  [address] [facility] [address]  Phone: [phone] Fax: [place] , [place] - [address]  [address]  Phone: [phone] Fax: [phone]    Primary Care Provider: Curry Mccabe DO    Primary Insurance: [employer] [address]  Secondary Insurance:     DISCHARGE DETAILS:        CM spoke with pt's Viviana Edge, who provided some DC planning assistance  CM reviewed the care team is recommending VNA services at home upon DC  Jessica Tam stated they have their own nursing and therapy that will go into pt's home in order to provide these services  She reports all that is needed is an order at DC  She is requesting the AVS be faxed to her at [phone] once it has been completed  Jessica Tam stated pt lives with his daughter and goes the Center Mondays  He has HHA's M-F in the morning  Per Jessica Tam, pt will most likely need BLS transportation home as he has stairs to get into the home  She reminded CM that they contract with [facility]  Cm will continue to follow to assist with needs and planning

## 2022-05-04 NOTE — ASSESSMENT & PLAN NOTE
Lab Results   Component Value Date    HGBA1C 8 4 (H) 12/14/2021       Recent Labs     05/03/22  0718 05/03/22  1601 05/03/22  2143 05/04/22  0718   POCGLU 153* 216* 129 142*       Blood Sugar Average: Last 72 hrs:  · Pt takes metformin at home, hold home med  · SSI while inpatient  · Accuchecks  · Hypoglycemic protocol

## 2022-05-04 NOTE — ASSESSMENT & PLAN NOTE
POA: Pt's daughter states that she witnessed her father take a sip of juice at the dinner table and then watched his hands start shaking and his arms began moving back and forth uncontrollably which lasted for a few minutes then resolved  Pt's daughter states that pt has baseline tremor but never that severe  CT head without contrast, Result Date: 5/2/2022  Impression: Images degraded by motion artifact  No acute intracranial abnormality identified within the limitations of the examination  Pansinusitis again noted  Volume loss with unchanged ventriculomegaly, which could suggest mild superimposed hydrocephalus  Correlate for any clinical evidence of normal pressure hydrocephalus     Magnesium 1 7  TSH low, elevated Free T4 on admission    Per Medical student assessment 5/4:  No resting tremor in UEsx2  LUE strength 3/5  RUE strength 1/5  LE strength 1/5 bilaterally  Suspicion for worsening baseline tremor d/t supratherapeutic levothyroxine dose, deconditioning, and/or possible infection, Need to r/o seizure and stroke    Plan:   Follow neuro recommendations:  · Await results of EEG to evaluate for seizure activity  · Hold MRI brain, patient may not tolerate d/t excess movement  · Decrease levothyroxine dose to 112mg QD, repeat TSH in 4-6 weeks outpatient

## 2022-05-04 NOTE — UTILIZATION REVIEW
Continued Stay Review    Date: 5/4/2022                          Current Patient Class: inpatient     Current Level of Care: med surg     HPI:87 y o  male initially admit OBSERVATION ADMISSION 5/2/2022 2241 CONVERTED TO INPATIENT ADMISSION 5/3/2022 1305 DUE TO ONGOING MONITORING/ WORKUP  IN A PATIENT W BASELINE ALZHEIMER'S DEMENTIA W/ VIOLENT SHAKING OF HANDS REQ ONGOING TESTING (EEG, CORRECTION OF ANY METABOLIC IMBALANCES ( PER NEURO CONSULT)     Assessment/Plan:   Attending: Add Norvasc for BP control; Tremor likely due to too high a Synthroid dose- decrease dose; requires stay for monitoring BP & EEG to eval for seizure activity     Exam  AOx0, consistent with his baseline mental status  Patient appeared cheerful, engaged, and comfortable  Pt  Denied having any pain, Motor: Weakness present  Comments: LUE strength 3/5, RUE strength 1/5  LE strength 1/5 bilaterally; Did not appreciate a resting tremor in UEs, improvement from yesterdays low-amplitude resting tremor   Neurology   Management pending EEG; adjust levothyroxine & monitor TSH  Downtrending WBC UA + for bacteria; cont ASA & statin; input per Geriatric medicine  Delirium precautions   No acute events overnight   Vital Signs: & GCS=  Date and Time Eye Opening Best Verbal Response Best Motor Response Addy Coma Scale Score   05/03/22 1800 4 4 6 14   05/03/22 1130 4 4 6 14   05/03/22 0800 4 4 6 14   05/03/22 0400 4 4 6 14   05/03/22 0137 4 4 6 14   05/03/22 0000 4 5 6 15   05/02/22 2044 4 5 6 15     Date/Time Temp Pulse Resp BP MAP (mmHg) SpO2 O2 Device Patient Position - Orthostatic VS   05/04/22 07:17:42 97 7 °F (36 5 °C) 54 Abnormal  18 144/67 93 98 % -- --   05/03/22 2157 98 2 °F (36 8 °C) 63 19 162/75 -- 97 % None (Room air) Lying   05/03/22 1800 -- -- -- -- -- -- None (Room air) --   05/03/22 17:56:58 -- 61 -- 164/72 103 97 % -- --   05/03/22 1731 98 1 °F (36 7 °C) 63 18 202/132 Abnormal  157 98 % None (Room air) Sitting           Pertinent Labs/Diagnostic Results:       Results from last 7 days   Lab Units 05/04/22  0507 05/03/22  0507 05/02/22  2049   WBC Thousand/uL 6 43 14 12* 9 89   HEMOGLOBIN g/dL 10 6* 11 8* 12 7   HEMATOCRIT % 32 0* 35 6* 40 1   PLATELETS Thousands/uL 153 207 223   NEUTROS ABS Thousands/µL  --  12 14* 8 16*         Results from last 7 days   Lab Units 05/04/22  0506 05/03/22  0507 05/02/22  2049   SODIUM mmol/L 139 145 145   POTASSIUM mmol/L 3 7 3 6 4 0   CHLORIDE mmol/L 108 111* 108   CO2 mmol/L 25 28 30   ANION GAP mmol/L 6 6 7   BUN mg/dL 19 21 22   CREATININE mg/dL 0 65 0 82 0 97   EGFR ml/min/1 73sq m 88 81 71   CALCIUM mg/dL 8 3* 8 6 8 6   MAGNESIUM mg/dL  --  1 7 1 7   PHOSPHORUS mg/dL  --  2 7  --      Results from last 7 days   Lab Units 05/03/22  0507 05/02/22  2049   AST U/L 21 18   ALT U/L 28 26   ALK PHOS U/L 66 81   TOTAL PROTEIN g/dL 6 3* 7 0   ALBUMIN g/dL 2 9* 3 3*   TOTAL BILIRUBIN mg/dL 0 64 0 37     Results from last 7 days   Lab Units 05/04/22  0718 05/03/22  2143 05/03/22  1601 05/03/22  0718   POC GLUCOSE mg/dl 142* 129 216* 153*     Results from last 7 days   Lab Units 05/04/22  0506 05/03/22  0507 05/02/22  2049   GLUCOSE RANDOM mg/dL 118 164* 235*             No results found for: BETA-HYDROXYBUTYRATE                   Results from last 7 days   Lab Units 05/03/22  0100 05/02/22  2336 05/02/22  2049   HS TNI 0HR ng/L  --   --  9   HS TNI 2HR ng/L  --  9  --    HSTNI D2 ng/L  --  0  --    HS TNI 4HR ng/L 9  --   --    HSTNI D4 ng/L 0  --   --          Results from last 7 days   Lab Units 05/02/22  2049   PROTIME seconds 14 7*   INR  1 15   PTT seconds 27     Results from last 7 days   Lab Units 05/02/22  2049   TSH 3RD GENERATON uIU/mL 0 200*                                                 Results from last 7 days   Lab Units 05/03/22  1601   CLARITY UA  Cloudy   COLOR UA  Yellow   SPEC GRAV UA  >=1 030   PH UA  6 0   GLUCOSE UA mg/dl 250 (1/4%)*   KETONES UA mg/dl Negative   BLOOD UA  Moderate* PROTEIN UA mg/dl Trace*   NITRITE UA  Positive*   BILIRUBIN UA  Negative   UROBILINOGEN UA E U /dl 2 0*   LEUKOCYTES UA  Trace*   WBC UA /hpf 10-20*   RBC UA /hpf None Seen   BACTERIA UA /hpf Innumerable*   EPITHELIAL CELLS WET PREP /hpf Occasional       Medications:   Scheduled Medications:  aspirin, 81 mg, Oral, QAM  atorvastatin, 20 mg, Oral, Daily  cefTRIAXone, 1,000 mg, Intravenous, Q24H  heparin (porcine), 5,000 Units, Subcutaneous, Q8H LUISA  insulin lispro, 1-5 Units, Subcutaneous, TID AC  levothyroxine, 112 mcg, Oral, QAM  polyethylene glycol, 17 g, Oral, Daily  tamsulosin, 0 4 mg, Oral, Daily With Dinner  Continuous IV Infusions:  multi-electrolyte, 100 mL/hr, Intravenous, Continuous    PRN Meds:  acetaminophen, 650 mg, Oral, Q6H PRN  calcium carbonate, 1,000 mg, Oral, Daily PRN  LORazepam, 0 5 mg, Intravenous, Once PRN  ondansetron, 4 mg, Intravenous, Q6H PRN    Discharge Plan: [facility]    Network Utilization Review Department  ATTENTION: Please call with any questions or concerns to [phone] and carefully listen to the prompts so that you are directed to the right person  All voicemails are confidential   Lizzy Sosa all requests for admission clinical reviews, approved or denied determinations and any other requests to dedicated fax number below belonging to the campus where the patient is receiving treatment   List of dedicated fax numbers for the Facilities:  [address] DENIALS (Administrative/Medical Necessity) [phone]   [address] (Maternity/NICU/Pediatrics) [phone]   [address]  [address] [facility] [place] [address] [id number] [address] [place] [address] [address] [phone]

## 2022-05-05 VITALS
DIASTOLIC BLOOD PRESSURE: 70 MMHG | BODY MASS INDEX: 24.54 KG/M2 | OXYGEN SATURATION: 97 % | RESPIRATION RATE: 18 BRPM | HEIGHT: 60 IN | WEIGHT: 125 LBS | TEMPERATURE: 98.1 F | SYSTOLIC BLOOD PRESSURE: 155 MMHG | HEART RATE: 54 BPM

## 2022-05-05 PROBLEM — R00.1 BRADYCARDIA: Status: RESOLVED | Noted: 2021-03-24 | Resolved: 2022-05-05

## 2022-05-05 PROBLEM — E86.0 DEHYDRATION: Status: RESOLVED | Noted: 2022-05-03 | Resolved: 2022-05-05

## 2022-05-05 PROBLEM — D72.829 LEUKOCYTOSIS: Status: RESOLVED | Noted: 2022-05-03 | Resolved: 2022-05-05

## 2022-05-05 LAB
ANION GAP SERPL CALCULATED.3IONS-SCNC: 5 MMOL/L (ref 4–13)
ATRIAL RATE: 258 BPM
ATRIAL RATE: 375 BPM
BUN SERPL-MCNC: 19 MG/DL (ref 5–25)
CALCIUM SERPL-MCNC: 8 MG/DL (ref 8.4–10.2)
CHLORIDE SERPL-SCNC: 107 MMOL/L (ref 96–108)
CO2 SERPL-SCNC: 27 MMOL/L (ref 21–32)
CREAT SERPL-MCNC: 0.72 MG/DL (ref 0.6–1.3)
ERYTHROCYTE [DISTWIDTH] IN BLOOD BY AUTOMATED COUNT: 13.2 % (ref 11.6–15.1)
GFR SERPL CREATININE-BSD FRML MDRD: 85 ML/MIN/1.73SQ M
GLUCOSE SERPL-MCNC: 116 MG/DL (ref 65–140)
GLUCOSE SERPL-MCNC: 124 MG/DL (ref 65–140)
GLUCOSE SERPL-MCNC: 194 MG/DL (ref 65–140)
GLUCOSE SERPL-MCNC: 233 MG/DL (ref 65–140)
HCT VFR BLD AUTO: 33.3 % (ref 36.5–49.3)
HGB BLD-MCNC: 11 G/DL (ref 12–17)
MCH RBC QN AUTO: 31.3 PG (ref 26.8–34.3)
MCHC RBC AUTO-ENTMCNC: 33 G/DL (ref 31.4–37.4)
MCV RBC AUTO: 95 FL (ref 82–98)
PLATELET # BLD AUTO: 156 THOUSANDS/UL (ref 149–390)
PMV BLD AUTO: 10.7 FL (ref 8.9–12.7)
POTASSIUM SERPL-SCNC: 4 MMOL/L (ref 3.5–5.3)
QRS AXIS: 93 DEGREES
QRS AXIS: 94 DEGREES
QRSD INTERVAL: 66 MS
QRSD INTERVAL: 78 MS
QT INTERVAL: 332 MS
QT INTERVAL: 356 MS
QTC INTERVAL: 403 MS
QTC INTERVAL: 456 MS
RBC # BLD AUTO: 3.52 MILLION/UL (ref 3.88–5.62)
SODIUM SERPL-SCNC: 139 MMOL/L (ref 135–147)
T WAVE AXIS: 59 DEGREES
T WAVE AXIS: 89 DEGREES
VENTRICULAR RATE: 89 BPM
VENTRICULAR RATE: 99 BPM
WBC # BLD AUTO: 5.61 THOUSAND/UL (ref 4.31–10.16)

## 2022-05-05 PROCEDURE — 80048 BASIC METABOLIC PNL TOTAL CA: CPT

## 2022-05-05 PROCEDURE — 93010 ELECTROCARDIOGRAM REPORT: CPT | Performed by: INTERNAL MEDICINE

## 2022-05-05 PROCEDURE — 99232 SBSQ HOSP IP/OBS MODERATE 35: CPT | Performed by: INTERNAL MEDICINE

## 2022-05-05 PROCEDURE — 99239 HOSP IP/OBS DSCHRG MGMT >30: CPT | Performed by: GENERAL PRACTICE

## 2022-05-05 PROCEDURE — 85027 COMPLETE CBC AUTOMATED: CPT

## 2022-05-05 PROCEDURE — 82948 REAGENT STRIP/BLOOD GLUCOSE: CPT

## 2022-05-05 PROCEDURE — 92526 ORAL FUNCTION THERAPY: CPT

## 2022-05-05 RX ORDER — LEVOTHYROXINE SODIUM 112 UG/1
112 TABLET ORAL EVERY MORNING
Qty: 30 TABLET | Refills: 0 | Status: SHIPPED | OUTPATIENT
Start: 2022-05-05 | End: 2022-05-05 | Stop reason: HOSPADM

## 2022-05-05 RX ORDER — CEPHALEXIN 500 MG/1
500 CAPSULE ORAL EVERY 6 HOURS SCHEDULED
Qty: 20 CAPSULE | Refills: 0 | Status: SHIPPED | OUTPATIENT
Start: 2022-05-06 | End: 2022-05-05 | Stop reason: SDUPTHER

## 2022-05-05 RX ORDER — LEVOTHYROXINE SODIUM 112 UG/1
112 TABLET ORAL EVERY MORNING
Qty: 30 TABLET | Refills: 0 | Status: SHIPPED | OUTPATIENT
Start: 2022-05-06 | End: 2022-06-05

## 2022-05-05 RX ORDER — AMLODIPINE BESYLATE 2.5 MG/1
2.5 TABLET ORAL DAILY
Qty: 30 TABLET | Refills: 0 | Status: SHIPPED | OUTPATIENT
Start: 2022-05-06 | End: 2022-05-05

## 2022-05-05 RX ORDER — CEPHALEXIN 500 MG/1
500 CAPSULE ORAL EVERY 6 HOURS SCHEDULED
Qty: 20 CAPSULE | Refills: 0 | Status: SHIPPED | OUTPATIENT
Start: 2022-05-06 | End: 2022-05-17 | Stop reason: ALTCHOICE

## 2022-05-05 RX ORDER — AMLODIPINE BESYLATE 2.5 MG/1
2.5 TABLET ORAL DAILY
Qty: 30 TABLET | Refills: 0 | Status: SHIPPED | OUTPATIENT
Start: 2022-05-06 | End: 2022-06-05

## 2022-05-05 RX ADMIN — INSULIN LISPRO 2 UNITS: 100 INJECTION, SOLUTION INTRAVENOUS; SUBCUTANEOUS at 17:04

## 2022-05-05 RX ADMIN — TAMSULOSIN HYDROCHLORIDE 0.4 MG: 0.4 CAPSULE ORAL at 17:05

## 2022-05-05 RX ADMIN — CEFTRIAXONE SODIUM 1000 MG: 10 INJECTION, POWDER, FOR SOLUTION INTRAVENOUS at 08:37

## 2022-05-05 RX ADMIN — LEVOTHYROXINE SODIUM 112 MCG: 112 TABLET ORAL at 05:46

## 2022-05-05 RX ADMIN — POLYETHYLENE GLYCOL 3350 17 G: 17 POWDER, FOR SOLUTION ORAL at 08:36

## 2022-05-05 RX ADMIN — HEPARIN SODIUM 5000 UNITS: 5000 INJECTION INTRAVENOUS; SUBCUTANEOUS at 05:46

## 2022-05-05 RX ADMIN — INSULIN LISPRO 1 UNITS: 100 INJECTION, SOLUTION INTRAVENOUS; SUBCUTANEOUS at 11:42

## 2022-05-05 RX ADMIN — AMLODIPINE BESYLATE 2.5 MG: 2.5 TABLET ORAL at 08:36

## 2022-05-05 RX ADMIN — ASPIRIN 81 MG: 81 TABLET ORAL at 08:36

## 2022-05-05 RX ADMIN — ATORVASTATIN CALCIUM 20 MG: 20 TABLET, FILM COATED ORAL at 08:36

## 2022-05-05 NOTE — DISCHARGE SUMMARY
[facility]  Discharge- Frandy Riddle 1935, 82 y o  male MRN: [id number]  Unit/Bed#: S -01 Encounter: [id number]  Primary Care Provider: Yvonne Virk DO   Date and time admitted to hospital: 5/2/2022  8:33 PM    * Tremor of both hands  Assessment & Plan  POA: Pt's daughter states that she witnessed her father take a sip of juice at the dinner table and then watched his hands start shaking and his arms began moving back and forth uncontrollably which lasted for a few minutes then resolved  Pt's daughter states that pt has baseline tremor but never that severe  CT head without contrast, Result Date: 5/2/2022:  No acute pathology  Mild superimposed hydrocephalus  Correlate for any clinical evidence of normal pressure hydrocephalus  Magnesium 1 7  TSH low, elevated Free T4 on admission    Suspicion for worsening baseline tremor d/t supratherapeutic levothyroxine dose, deconditioning, and UTI    5/5 - EEG finding: a mild generalized non-specific encephalopathy  No electrographic seizures, interictal epileptiform discharges (seizure tendency) or focal slowing were seen  No diagnostic clinical events were captured       Plan:   · Decreased levothyroxine dose to 112mg QD, repeat TSH in 4-6 weeks outpatient  · Discharge with 5 day course of Keflex to complete 7 day course of antibiotics for UTI  · Patient is medically stable to be discharged home with home health care which they have 1 already set up  · Patient will follow-up with Neurology outpatient in 4-6 weeks  · Follow-up with PCP in a week  · Follow-up with geriatric medicine in a few weeks    Leukocytosis-resolved as of 5/5/2022  Assessment & Plan  5/5:WBCs down to 5 61  Pt wears adult diaper, increased risk for UTI  UA results show elevated WBCs, positive nitrites positive leukocytes than occasional bacteria    Plan:  · Ceftriaxone IV started on 05/04 Day 1  · Discharge with 5 days of oral Cefazolin **mg to complete course    Seizure-like activity [facility])  Assessment & Plan  5/5 - EEG negative for seizures:  Clinical Interpretation: This abnormal study is consistent with a mild generalized non-specific encephalopathy  No electrographic seizures, interictal epileptiform discharges (seizure tendency) or focal slowing were seen  No diagnostic clinical events were captured  Follow-up with Neurology outpatient in 4-6 weeks    Hypertension  Assessment & Plan  Patient heart rate as low as 40s and 50s, BP spikes high as 200/90  No history of HTN or HTN medications    Plan:  · Amlodipine 2 5mg QD upon discharge  · Follow-up with PCP for further adjustment    BPH (benign prostatic hyperplasia)  Assessment & Plan  Continue home flomax    Dementia without behavioral disturbance (HCC)  Assessment & Plan  Longstanding history of dementia  Patient at risk for delirium given age and co-morbidities  Fall precautions at home    UTI (urinary tract infection)  Assessment & Plan  UA positive glucose moderate blood, positive nitrite, WBC 10-20 and innumerable bacteria  Preliminary urine culture:  [id number] CFU/mL of E coli    Patient has received IV ceftriaxone for 2 days while hospitalization  Will prescribe Keflex 500 mg q 6 for 5 days to complete 7 day course of antibiotic    Mixed hyperlipidemia  Assessment & Plan  Continue home atorvastatin    Type 2 diabetes mellitus without complication, without long-term current use of insulin [facility])  Assessment & Plan  Lab Results   Component Value Date    HGBA1C 8 4 (H) 12/14/2021       Recent Labs     05/04/22  1712 05/04/22  2114 05/05/22  0738 05/05/22  1113   POCGLU 126 166* 124 194*     Blood Sugar Average: Last 72 hrs:  · Continue home metformin upon discharge    History of CVA (cerebrovascular accident)  Assessment & Plan  Continue home aspirin  CT head shows no acute findings of infarct or hemmhorrage on final read; findings suggest NPH if clinical correlation   Difficult to correlate due to pt's altered mentation due to dementia at baseline, gait dysfunction at baseline, and urinary and fecal incontinence at baseline  No acute changes in the above per daughter  MRI on hold, unable to lay still  Follow-up with Neurology outpatient in 4-6 weeks    Acquired hypothyroidism  Assessment & Plan  Decreased levothyroxine dose to 112 mg QD for suspected tremors, follow up TSH in 4-6 weeks    Ambulatory dysfunction  Assessment & Plan  Pt with history of dependence on assistance for walking  Daughter reports pt walks at home with walker and assistance, however mainly sits in wheelchair; she denies acute change    Plan:  · Fall precautions  · Pt should only be out of bed with assistance  · [facility]charge with [facility] as per PT/OT    Dehydration-resolved as of 5/5/2022  Assessment & Plan  5/5 - Mucus membranes appear moist  Encourage PO intake of fluids  Received 1L plasmalyte-A/isolyte-S IV solution    Bradycardia-resolved as of 5/5/2022  Assessment & Plan  POA: Pt had episode of bradycardia to 46bpm in ED triage   First EKG remarkable for bigeminy and was limited by artifact; follow up EKG was sinus rhythm with regular rate also limited by artifact  Telemetry reviewed and unremarkable, discontinued    Medical Problems             Resolved Problems  Date Reviewed: 5/5/2022          Resolved    Bradycardia 5/5/2022     Resolved by  Rufina Gimenez    Dehydration 5/5/2022     Resolved by  Rufina Gimenez    Leukocytosis 5/5/2022     Resolved by  Rufina Gimenez    Overview Deleted 5/3/2022  3:36 PM by Rufina Gimenez                      Discharging Resident: Sofi Villatoro MD  Discharging Attending: Kathy Antunez DO  PCP: Nyasia Mejía DO  Admission Date:   Admission Orders (From admission, onward)     Ordered        05/03/22 1305  Inpatient Admission  Once            05/02/22 2241  Place in Observation  Once                      Discharge Date: 05/05/22    Consultations During Hospital Stay:  · Neurology    Procedures Performed:   · EEG    Significant Findings / Test Results:   · Preliminary urine culture:  [id number] CFU/mL of E coli    Incidental Findings:   · None     Test Results Pending at Discharge (will require follow up): · Final urine culture     Outpatient Tests Requested:  · TSH level recheck in 4-6 weeks    Complications:  None    Reason for Admission:  Seizure-like activity    Hospital Course:   Donna Arreola is a 82 y o  male patient with past medical history of advanced dementia, BPH, hyperlipidemia, type 2 diabetes, history of CVA on aspirin, ambulatory dysfunction and urinary incontinence who originally presented to the hospital on 5/2/2022 due to tremors of both hands and arms and moving back and forth uncontrollably for about a few minutes before resolving  Patient has baseline tremor however per daughter it has never been this severe  In ED, CT head without contrast showed no acute pathology  Suggest mild superimposed hydrocephalus  Magnesium 1 7, TSH low and elevated free T4 on admission  Suspecting for worsening baseline trauma due to supratherapeutic levothyroxine dose versus deconditioning versus UTI as patient UA found to be positive and urine culture preliminary so far shows [id number] CFU/mL of E coli  Neurology was consulted for seizure-like activity an EEG was done and, which showed negative for seizure  Patient is also found to be hypertensive during hospitalization  Initiated amlodipine 2 5 mg daily and patient will continue upon discharge  Due to low TSH and elevated free T4, levothyroxine doses adjusted to 112 mcg daily  Patient has received 2 day course of ceftriaxone  Patient will be discharged home with Keflex for 5 days to complete 7 day course of antibiotic  Patient will follow-up with PCP to further adjust thyroid medication and antihypertensive medication  Will follow up with Neurology in 4-6 weeks  Will follow-up with geriatric medicine in a few weeks      Please see above list of diagnoses and related plan for additional information  Condition at Discharge: stable    Discharge Day Visit / Exam:   Subjective:  Patient is doing well this morning no new complaint  Vitals: Blood Pressure: 158/75 (05/05/22 0737)  Pulse: (!) 49 (05/05/22 0737)  Temperature: 98 °F (36 7 °C) (05/05/22 0737)  Temp Source: Axillary (05/03/22 2157)  Respirations: 18 (05/05/22 0737)  Height: 4' 8" (142 2 cm) (05/03/22 0000)  Weight - Scale: 56 7 kg (125 lb) (05/03/22 0000)  SpO2: 95 % (05/05/22 0737)  Exam:   Physical Exam  Vitals and nursing note reviewed  Constitutional:       Appearance: He is well-developed  Comments: Patient does not really follow command likely due to advanced dementia   HENT:      Head: Normocephalic and atraumatic  Nose: Nose normal    Eyes:      Conjunctiva/sclera: Conjunctivae normal    Cardiovascular:      Rate and Rhythm: Normal rate and regular rhythm  Heart sounds: No murmur heard  Pulmonary:      Effort: Pulmonary effort is normal  No respiratory distress  Breath sounds: Normal breath sounds  Abdominal:      Palpations: Abdomen is soft  Tenderness: There is no abdominal tenderness  Musculoskeletal:      Cervical back: Neck supple  Right lower leg: No edema  Left lower leg: No edema  Skin:     General: Skin is warm and dry  Neurological:      Mental Status: He is alert  Comments: Per Medical student assessment 5/5:  No resting tremor in UEsx2  LUE strength 2/5  RUE strength 1/5  LE strength 1/5 bilaterally       Discussion with Family: Updated  (daughter) at bedside  Discharge instructions/Information to patient and family:   See after visit summary for information provided to patient and family  Provisions for Follow-Up Care:  See after visit summary for information related to follow-up care and any pertinent home health orders         Disposition:   Home with [facility] (Reminder: Complete face to face encounter) they have one already set up    Planned Readmission:  None    Discharge Medications:  See after visit summary for reconciled discharge medications provided to patient and/or family        **Please Note: This note may have been constructed using a voice recognition system**

## 2022-05-05 NOTE — CASE MANAGEMENT
Case Management Discharge Planning Note    Patient name Keegan Bedolla  Location S /S -01 MRN [id number]  : 1935 Date 2022       Current Admission Date: 2022  Current Admission Diagnosis:Tremor of both hands   Patient Active Problem List    Diagnosis Date Noted    Hypertension 2022    Seizure-like activity ([address] )     Tremor of both hands 2022    BPH (benign prostatic hyperplasia) 2022    Dehydration 2022    Leukocytosis 2022    Cortical age-related cataract of both eyes 2021    Bradycardia 2021    Urinary incontinence 2020    Fecal incontinence 2020    Encounter for medication review 2020    Dementia without behavioral disturbance ([address] ) 2020    Diverticulosis 2020    Benign prostatic hyperplasia with weak urinary stream 2019    Mild intermittent asthma without complication     Allergic reaction 2019    Ambulatory dysfunction 2019    Acquired hypothyroidism 2019    History of CVA (cerebrovascular accident) 2019    Type 2 diabetes mellitus without complication, without long-term current use of insulin ([address] ) 2019    Mixed hyperlipidemia 2019      LOS (days): 2  Geometric Mean LOS (GMLOS) (days): 2 60  Days to GMLOS:0 7     OBJECTIVE:  Risk of Unplanned Readmission Score: 16         Current admission status: Inpatient   Preferred Pharmacy:   [facility] [address] RAS YUSUF NUROVERTO [address]  [address] [facility] [address] [address]  Phone: [phone] Fax: [facility]257 [facility], [facility] [address] [facility] [address] [address]  Phone: [phone] Fax: [phone]    [address]  [address] [facility] [address] [address]  Phone: [phone] Fax: Janette [address], [address]  [address] [facility] Azeb Segura [address]  Phone: [phone] Fax: [phone]    Primary Care Provider: Herrera Gardner DO    Primary Insurance: [place] [facility]  Secondary Insurance:     DISCHARGE DETAILS:    Discharge planning discussed with[de-identified] [facility]     Comments - Freedom of Choice: CM made the above mentioned individuals aware of the Pt's d/c to home today  CM made a referral to [facility] for a bls transport and is currently awaiting an assigned time  Alecia Strickland reported there is 2 steps and a short ramp on the outside to enter the home, and once inside there are 7 steps to get to the Pt's room  Contacts  Patient Contacts: Tony Karimi  Relationship to Patient[de-identified] Family  Contact Method: Phone  Phone Number: [phone]  Reason/Outcome: Discharge Planning          Transport at Discharge : [facility]  Dispatcher Contacted:  Yes

## 2022-05-05 NOTE — ASSESSMENT & PLAN NOTE
Pt with history of dependence on assistance for walking  Daughter reports pt walks at home with walker and assistance, however mainly sits in wheelchair; she denies acute change    Plan:  · Fall precautions  · Pt should only be out of bed with assistance  · Discharge with [facility] as per PT/OT

## 2022-05-05 NOTE — QUICK NOTE
Reviewed routine EEG which shows evidence non-specific encephalopathy  Although routine EEG does not rule out seizure, presentation likely related to patients infection in the setting of poor cognitive reserve  Recommend management as per primary team, adjusting levothyroxine and monitoring TSH  follow-up with Geriatrics and Neurology as outpatient  Continue aspirin and statin    Call or [facility] neurology if any questions or concerns

## 2022-05-05 NOTE — ASSESSMENT & PLAN NOTE
Continue home aspirin  CT head shows no acute findings of infarct or hemmhorrage on final read; findings suggest NPH if clinical correlation  Difficult to correlate due to pt's altered mentation due to dementia at baseline, gait dysfunction at baseline, and urinary and fecal incontinence at baseline   No acute changes in the above per daughter  MRI on hold, unable to lay still  Follow-up with Neurology outpatient in 4-6 weeks

## 2022-05-05 NOTE — ASSESSMENT & PLAN NOTE
5/5:WBCs down to 5 61  Pt wears adult diaper, increased risk for UTI  UA results show elevated WBCs, positive nitrites positive leukocytes than occasional bacteria    Plan:  · Ceftriaxone IV started on 05/04 Day 1  · Discharge with 5 days of oral Cefazolin **mg to complete course

## 2022-05-05 NOTE — ASSESSMENT & PLAN NOTE
Patient heart rate as low as 40s and 50s, BP spikes high as 200/90  No history of HTN or HTN medications    Plan:  · Amlodipine 2 5mg QD upon discharge  · Follow-up with PCP for further adjustment

## 2022-05-05 NOTE — ASSESSMENT & PLAN NOTE
5/5 - EEG negative for seizures:  Clinical Interpretation: This abnormal study is consistent with a mild generalized non-specific encephalopathy  No electrographic seizures, interictal epileptiform discharges (seizure tendency) or focal slowing were seen  No diagnostic clinical events were captured     Follow-up with Neurology outpatient in 4-6 weeks

## 2022-05-05 NOTE — SPEECH THERAPY NOTE
Speech Language/Pathology    Speech/Language Pathology Progress Note    Patient Name: Jose Chun  [id number] Date: 5/5/2022     Problem List  Principal Problem:    Tremor of both hands  Active Problems:    Ambulatory dysfunction    History of CVA (cerebrovascular accident)    Type 2 diabetes mellitus without complication, without long-term current use of insulin (HCC)    Mixed hyperlipidemia    Dementia without behavioral disturbance (HCC)    Bradycardia    BPH (benign prostatic hyperplasia)    Dehydration    Leukocytosis    Hypertension    Seizure-like activity (HCC)       Past Medical History  Past Medical History:   Diagnosis Date    Allergic     Lactose: Shellfish    Asthma     Dementia ([facility] )     Diabetes mellitus ([facility] )     Diverticulitis of colon     Glaucoma     Memory loss         Past Surgical History  Past Surgical History:   Procedure Laterality Date    EYE SURGERY      TESTICLE SURGERY           Subjective:  Pt awake and alert, pleasant  Yakut translation provided for entirety of tx session  Of note, pt ? Dc to home today per RN  Current Diet:  Dysphagia 3 w/ nectar     Objective:  Pt seen for dx dysphagia tx  Pt seen w/ trials of upgrades for potential diet advancement back to baseline diet regular w/ thin liquids  Pt seen w/ approx 12 oz thin liquid via straw and hard solid cookies  Mastication of hard solid material is adequate  Oral manipulation/organization are timely w/ no significant oral residue  Swallows appear fairly prompt w/ adequate rise to palpation  Pt completed 12 oz w/ dry cough x1 while taking consecutive large sips  No other overt s/s aspiration  Spoke w/ pt's daughter via telephone  Pt's daughter reports pt has no history of s/s aspiration or difficulty swallowing  She reports he uses a specific cup at home that only allows small sips  Education provided on risks of aspiration options for diet modifications to potentially reduce aspiration   Further education provided on risks vs benefits of liquid/solid texture modifications (reduced aspiration risk and subsequent medical implications, increased retention, potential dehydration, etc )  Education provided on strategies to optimize swallow safety without dysphagia diet modifications, including the importance of oral care and s/s medical complications associated w/ aspiration to monitor for and notify medical team of should they arise  Pt's daughter states at this time, would prefer to trial thin liquids and notify medical team to get ST services if needed  Physician notified of education and choice  Assessment:  Pt w/ s/s min pharyngeal impairment, dry cough x1 w/ large consecutive sips of thin  Pt's daughter aware of aspiration risk at this time       Plan/Recommendations:  Regular w/ thin - small/single sips  Strict aspiration precautions   Frequent and thorough oral care  ST f/u as able and appropriate if not d/c

## 2022-05-05 NOTE — ASSESSMENT & PLAN NOTE
UA positive glucose moderate blood, positive nitrite, WBC 10-20 and innumerable bacteria  Preliminary urine culture:  [id number] CFU/mL of E coli    Patient has received IV ceftriaxone for 2 days while hospitalization  Will prescribe Keflex 500 mg q 6 for 5 days to complete 7 day course of antibiotic

## 2022-05-05 NOTE — ASSESSMENT & PLAN NOTE
Lab Results   Component Value Date    HGBA1C 8 4 (H) 12/14/2021       Recent Labs     05/04/22  1712 05/04/22  2114 05/05/22  0738 05/05/22  1113   POCGLU 126 166* 124 194*     Blood Sugar Average: Last 72 hrs:  · Continue home metformin upon discharge

## 2022-05-05 NOTE — DISCHARGE INSTR - AVS FIRST PAGE
Dear Maeve Flores,     It was our pleasure to care for you here at [facility]  It is our hope that we were always able to exceed the expected standards for your care during your stay  You were hospitalized due to seizure-like symptoms  You were cared for on the 4th floor by Celia Mancia MD under the service of Mari Murrieta DO with the [facility] who covers for your primary care physician (PCP), Berta Bynum DO, while you were hospitalized  If you have any questions or concerns related to this hospitalization, you may contact us at [phone]  For follow up as well as any medication refills, we recommend that you follow up with your primary care physician  A registered nurse will reach out to you by phone within a few days after your discharge to answer any additional questions that you may have after going home  However, at this time we provide for you here, the most important instructions / recommendations at discharge:     Notable Medication Adjustments -     Start taking amlodipine 2 5 mg tablet daily    Take cephalexin (Keflex) 500 mg capsule by mouth every 6 hours for 5 days starting on May 6, 2022    Levothyroxine dosage is adjusted to 112 mcg from 125mcg  Please take levothyroxine 112 mcg 1 tablet by mouth every morning    Follow-up with your primary care doctor to adjust the medication dosage    Testing Required after Discharge -   None  Important follow up information -     Follow-up with primary care physician Richmond Barros DO in a week regarding thyroid medication adjustment    Follow-up with neurology outpatient in 4-6 weeks    Follow-up with geriatric medicine outpatient in 4-6 weeks    Other Instructions -   Please take your medications consistently  Fall precaution    Please review this entire after visit summary as additional general instructions including medication list, appointments, activity, diet, any pertinent wound care, and other additional recommendations from your care team that may be provided for you        Sincerely,     Celia Mancia MD

## 2022-05-05 NOTE — ASSESSMENT & PLAN NOTE
5/5 - Mucus membranes appear moist  Encourage PO intake of fluids  Received 1L plasmalyte-A/isolyte-S IV solution

## 2022-05-05 NOTE — PROGRESS NOTES
Progress Note - Geriatric Medicine   Chad Garcia 82 y o  male MRN: [id number]  Unit/Bed#: S -01 Encounter: [id number]      Assessment/Plan:  1 -urinary tract infection  -patient urine growing 89,000 gram-negative rods enteric like has received antibiotics  Patient responded well  Patient's leukocytosis is improved patient was at 14,120 currently at 5610  Patient is afebrile at present  2 -bradycardia  -patient with a heart rate of 49 had a normal sinus rhythm on his EKG with rightward axis  3 -dementia most likely Alzheimer's type -patient at higher risk of developing delirium    4  -diabetes mellitus type 2 -would recommend increasing metformin to 1000 mg twice a day patient's last hemoglobin A1c was 8 5     5 -hypothyroidism  -patient's levothyroxine has been decreased to 112 mcg will need follow-up in 4-6 weeks    6 -history of urinary and stool incontinence    7 -ambulatory dysfunction  -patient needs walker at all times in order to get around  8 -anemia normochromic normocytic -patient's hemoglobin 11 hematocrit 33 3 stable at present    Subjective:   No active complaints spoke with daughter informed of patient's blood work and current status    Review of Systems   Reason unable to perform ROS: Patient unable to give me a review of systems because of his underlying dementia  Objective:     Vitals: Blood pressure 158/75, pulse (!) 49, temperature 98 °F (36 7 °C), resp  rate 18, height 4' 8" (1 422 m), weight 56 7 kg (125 lb), SpO2 95 %  ,Body mass index is 28 02 kg/m²  Intake/Output Summary (Last 24 hours) at 5/5/2022 1125  Last data filed at 5/5/2022 0953  Gross per 24 hour   Intake 120 ml   Output --   Net 120 ml       Current Medications: Reviewed    Physical Exam:   Physical Exam  Constitutional:       Appearance: Normal appearance  HENT:      Head: Normocephalic and atraumatic        Right Ear: Ear canal and external ear normal       Left Ear: Ear canal and external ear normal       Nose: Nose normal       Mouth/Throat:      Mouth: Mucous membranes are moist       Pharynx: Oropharynx is clear  Eyes:      Conjunctiva/sclera: Conjunctivae normal       Pupils: Pupils are equal, round, and reactive to light  Cardiovascular:      Rate and Rhythm: Normal rate and regular rhythm  Pulses: Normal pulses  Heart sounds: Normal heart sounds  Pulmonary:      Effort: Pulmonary effort is normal       Breath sounds: Normal breath sounds  Abdominal:      General: Bowel sounds are normal       Palpations: Abdomen is soft  Musculoskeletal:      Cervical back: Normal range of motion and neck supple  Skin:     General: Skin is warm  Neurological:      Mental Status: He is oriented to person, place, and time  Mental status is at baseline  Psychiatric:         Mood and Affect: Mood normal           Invasive Devices  Report    Peripheral Intravenous Line            Peripheral IV 05/05/22 Distal;Left;Ventral (anterior) Forearm <1 day                Lab, Imaging and other studies: I have personally reviewed pertinent reports

## 2022-05-06 ENCOUNTER — TRANSITIONAL CARE MANAGEMENT (OUTPATIENT)
Dept: FAMILY MEDICINE CLINIC | Facility: CLINIC | Age: 87
End: 2022-05-06

## 2022-05-06 LAB — BACTERIA UR CULT: ABNORMAL

## 2022-05-06 NOTE — UTILIZATION REVIEW
Notification of Discharge   This is a Notification of Discharge from our facility [address]  Please be advised that this patient has been discharge from our facility  Below you will find the admission and discharge date and time including the patients disposition  UTILIZATION REVIEW CONTACT:  Zoran Fagan  Utilization   Network Utilization Review Department  Phone: [phone] x carefully listen to the prompts  All voicemails are confidential   Email: [email or web address]     PHYSICIAN ADVISORY SERVICES:  FOR [id number] REVIEW - MEDICAL NECESSITY DENIAL  Phone: [phone]  Fax: [phone]  Email: [email or web address]     PRESENTATION DATE: 5/2/2022  8:33 PM  OBERVATION ADMISSION DATE:   INPATIENT ADMISSION DATE: 5/3/22  1:05 PM   DISCHARGE DATE: 5/5/2022  8:17 PM  DIS[facility]ITION[facility] with [address] [place] [address] Road INFORMATION:  Send all requests for admission clinical reviews, approved or denied determinations and any other requests to dedicated fax number below belonging to the campus where the patient is receiving treatment   List of dedicated fax numbers:  [address] DENIALS (Administrative/Medical Necessity) [phone]   [address] (Maternity/NICU/Pediatrics) [phone]   [facility] [phone]   130 [facility] [phone]   [address] [place] [address] [phone]   2000 [facility] [address] [place] [address],[address] [place] [address] [phone]   [facility]  [phone]   2205 [facility], [address]  2401 [facility] 1000 [address] [facility] [phone]

## 2022-05-17 ENCOUNTER — TELEMEDICINE (OUTPATIENT)
Dept: FAMILY MEDICINE CLINIC | Facility: CLINIC | Age: 87
End: 2022-05-17
Payer: MEDICARE

## 2022-05-17 VITALS
DIASTOLIC BLOOD PRESSURE: 62 MMHG | SYSTOLIC BLOOD PRESSURE: 157 MMHG | OXYGEN SATURATION: 98 % | TEMPERATURE: 97.6 F | HEART RATE: 57 BPM | BODY MASS INDEX: 25.15 KG/M2 | WEIGHT: 128.09 LBS | HEIGHT: 60 IN

## 2022-05-17 DIAGNOSIS — F03.90 DEMENTIA WITHOUT BEHAVIORAL DISTURBANCE, UNSPECIFIED DEMENTIA TYPE (HCC): ICD-10-CM

## 2022-05-17 DIAGNOSIS — R54 FRAIL ELDERLY: ICD-10-CM

## 2022-05-17 DIAGNOSIS — R56.9 SEIZURE-LIKE ACTIVITY (HCC): ICD-10-CM

## 2022-05-17 DIAGNOSIS — N30.00 ACUTE CYSTITIS WITHOUT HEMATURIA: ICD-10-CM

## 2022-05-17 DIAGNOSIS — I10 BENIGN ESSENTIAL HYPERTENSION: ICD-10-CM

## 2022-05-17 DIAGNOSIS — Z86.73 HISTORY OF CVA (CEREBROVASCULAR ACCIDENT): Chronic | ICD-10-CM

## 2022-05-17 DIAGNOSIS — E56.9 VITAMIN DEFICIENCY: ICD-10-CM

## 2022-05-17 DIAGNOSIS — E03.9 ACQUIRED HYPOTHYROIDISM: Primary | ICD-10-CM

## 2022-05-17 DIAGNOSIS — E11.9 TYPE 2 DIABETES MELLITUS WITHOUT COMPLICATION, WITHOUT LONG-TERM CURRENT USE OF INSULIN (HCC): ICD-10-CM

## 2022-05-17 DIAGNOSIS — E83.51 HYPOCALCEMIA: ICD-10-CM

## 2022-05-17 DIAGNOSIS — R25.1 TREMOR OF BOTH HANDS: ICD-10-CM

## 2022-05-17 PROBLEM — N39.0 UTI (URINARY TRACT INFECTION): Status: RESOLVED | Noted: 2019-12-12 | Resolved: 2022-05-17

## 2022-05-17 PROCEDURE — 99495 TRANSJ CARE MGMT MOD F2F 14D: CPT | Performed by: FAMILY MEDICINE

## 2022-05-17 NOTE — PROGRESS NOTES
Assessment/Plan:     Problem List Items Addressed This Visit        Unprioritized    History of CVA (cerebrovascular accident) (Chronic)    Acquired hypothyroidism - Primary    Relevant Orders    TSH, 3rd generation with Free T4 reflex    Benign essential hypertension    Dementia without behavioral disturbance ([address] )    Frail elderly    Seizure-like activity (HCC)    Tremor of both hands    Type 2 diabetes mellitus without complication, without long-term current use of insulin (HCC)    RESOLVED: UTI (urinary tract infection)      Other Visit Diagnoses     Hypocalcemia        Relevant Orders    Vitamin D 25 hydroxy    Calcium, ionized    Vitamin deficiency        Relevant Orders    Vitamin D 25 hydroxy    Calcium, ionized      pt seen today with daughter Betsy Rolon  She is concerned about cholesterol - watching his diet   Reviewed ways to help, but not to get too stressed   Will update labs per her request  Also update iCa and TSH   Doing well on current meds  Reviewed signs/symptoms of UTI - unclear if he had UTI or if colonization  Advised her we treat if he is positive AND with symptoms   She is not noticing any increased tremors at this time  No other concerns today      Return in about 6 weeks (around 6/28/2022) for Medicare Wellness Visit - virtually   Subjective:   Jessica Khan is a 82 y o  male here today for a hospital follow-up    Patient Active Problem List   Diagnosis    Ambulatory dysfunction    Acquired hypothyroidism    History of CVA (cerebrovascular accident)    Type 2 diabetes mellitus without complication, without long-term current use of insulin ([address] )    Mixed hyperlipidemia    Benign prostatic hyperplasia with weak urinary stream    Mild intermittent asthma without complication    Allergic reaction    Diverticulosis    Dementia without behavioral disturbance ([address] )    Urinary incontinence    Fecal incontinence    Encounter for medication review    Cortical age-related cataract of both eyes    Tremor of both hands    BPH (benign prostatic hyperplasia)    Benign essential hypertension    Seizure-like activity (HCC)    Frail elderly        Current medications:  Current Outpatient Medications   Medication Sig Dispense Refill    amLODIPine (NORVASC) 2 5 mg tablet Take 1 tablet (2 5 mg total) by mouth daily 30 tablet 0    aspirin (ECOTRIN LOW STRENGTH) 81 mg EC tablet Take 81 mg by mouth every morning      atorvastatin (LIPITOR) 20 mg tablet Take 1 tablet (20 mg total) by mouth daily 90 tablet 2    ergocalciferol (ERGOCALCIFEROL) 1 25 MG (24014 UT) capsule Take 50,000 Units by mouth every 30 (thirty) days Every 15th      levothyroxine 112 mcg tablet Take 1 tablet (112 mcg total) by mouth every morning 30 tablet 0    metFORMIN (GLUCOPHAGE) 1000 MG tablet Take 1,000 mg by mouth daily with breakfast        senna (SENOKOT) 8 6 MG tablet Take 1 tablet by mouth as needed       tamsulosin (FLOMAX) 0 4 mg Take 1 capsule (0 4 mg total) by mouth daily with dinner 90 capsule 3     No current facility-administered medications for this visit  HPI:   Chief Complaint   Patient presents with    Transition of Care Management     -- Above per clinical staff and reviewed  --    TCM Call (since 4/20/2022)     Date and time call was made  5/6/2022  5:25 PM    Hospital care reviewed  Records reviewed    Patient was hospitialized at  [address] [place] [address]    Date of Admission  05/02/22    Date of discharge  05/05/22    Diagnosis  Tremor of both hands    Disposition  Home    Were the patients medications reviewed and updated  Yes    Current Symptoms  None      TCM Call (since 4/20/2022)     Post hospital issues  None    Should patient be enrolled in anticoag monitoring? No    Scheduled for follow up?   Yes    Did you obtain your prescribed medications  Yes    Do you need help managing your prescriptions or medications  No    Is transportation to your appointment needed  No    I have advised the patient to call PCP with any new or worsening symptoms  [address]  Family; Children    The type of support provided  Physical; Emotional    Do you have social support  Yes, as much as I need          Hospital Course:    Hospital Course:   Jesu Lawrence is a 82 y o  male patient with past medical history of advanced dementia, BPH, hyperlipidemia, type 2 diabetes, history of CVA on aspirin, ambulatory dysfunction and urinary incontinence who originally presented to the hospital on 5/2/2022 due to tremors of both hands and arms and moving back and forth uncontrollably for about a few minutes before resolving  Patient has baseline tremor however per daughter it has never been this severe  In ED, CT head without contrast showed no acute pathology  Suggest mild superimposed hydrocephalus  Magnesium 1 7, TSH low and elevated free T4 on admission  Suspecting for worsening baseline trauma due to supratherapeutic levothyroxine dose versus deconditioning versus UTI as patient UA found to be positive and urine culture preliminary so far shows [id number] CFU/mL of E coli  Neurology was consulted for seizure-like activity an EEG was done and, which showed negative for seizure  Patient is also found to be hypertensive during hospitalization  Initiated amlodipine 2 5 mg daily and patient will continue upon discharge  Due to low TSH and elevated free T4, levothyroxine doses adjusted to 112 mcg daily  Patient has received 2 day course of ceftriaxone  Patient will be discharged home with Keflex for 5 days to complete 7 day course of antibiotic  Patient will follow-up with PCP to further adjust thyroid medication and antihypertensive medication  Will follow up with Neurology in 4-6 weeks  Will follow-up with geriatric medicine in a few weeks  Pt admitted for tremor  Noted to havel ow TSH and high T4 so Synthroid decreased  EEG ruled out seizure    Abnormal UA and pt cannot tell us if he has symptoms so tx for UTI  Rocephin while IP  D/c on Keflex based on Cx done OP by [facility]        Needs TFTS in 6 weeks by PCP        Today:  Seen with Josie Myles who provides his history   He is doing much better  She is worried about his cholesterol   To give him egg white whites now with veggies   Other days oatmeal with blueberries   She did not know he had a UTI   He was showing no signs of a UTI - never did       Steffany Hernandez presents today for hospital follow-up visit  Patient was contacted within 2 business days  Medications were reconciled  Hospital discharge summary was reviewed  As part of this post-hospital visit, records from the hospital, including history and physical examination, discharge summary, all laboratory tests and radiographic studies was reviewed with this patient  The following portions of the patient's history were reviewed and updated as appropriate: allergies, current medications, past family history, past medical history, past social history, past surgical history and problem list     Objective:  Vitals:  /62   Pulse 57   Temp 97 6 °F (36 4 °C)   Ht 4' 8" (1 422 m)   Wt 58 1 kg (128 lb 1 4 oz)   SpO2 98%   BMI 28 72 kg/m²    Wt Readings from Last 3 Encounters:   05/17/22 58 1 kg (128 lb 1 4 oz)   05/03/22 56 7 kg (125 lb)   12/06/21 56 7 kg (125 lb)      BP Readings from Last 3 Encounters:   05/17/22 157/62   05/05/22 155/70   12/06/21 128/78        Review of Systems   Unable due to dementia  Daughter's concerns as above  Physical Exam   Constitutional:  he appears well-developed and well-nourished  HENT: Head: Normocephalic  Neck: Neck supple  Cardiovascular: Normal rate, regular rhythm and normal heart sounds  Pulmonary/Chest: Effort normal and breath sounds normal  No wheezes, rales, or rhonchi  Abdominal: Soft  Bowel sounds are normal  There is no tenderness  No hepatosplenomegaly  Musculoskeletal: he exhibits no edema  Lymphadenopathy: he has no cervical adenopathy  Neurological: he is alert and oriented to person, place, and time  Skin: Skin is warm and dry  Psychiatric: he has a normal mood and affect  his behavior is normal  Thought content normal      BMI Counseling: Body mass index is 28 72 kg/m²  Follow-up plan was not completed due to elderly patient (74 years old) where weight reduction/weight gain would complicate underlying health condition such as: illness or physical disability  Depression Screening and Follow-up Plan: Patient not screened for depression due to medical reason (urgent/emergent situation, decreased capacity)

## 2022-10-04 ENCOUNTER — HOSPITAL ENCOUNTER (EMERGENCY)
Facility: HOSPITAL | Age: 87
Discharge: HOME | End: 2022-10-05
Attending: EMERGENCY MEDICINE
Payer: MEDICARE

## 2022-10-04 ENCOUNTER — APPOINTMENT (EMERGENCY)
Dept: CT IMAGING | Facility: HOSPITAL | Age: 87
End: 2022-10-04
Payer: MEDICARE

## 2022-10-04 DIAGNOSIS — N49.2 SCROTAL WALL ABSCESS: Primary | ICD-10-CM

## 2022-10-04 DIAGNOSIS — L02.219 ABSCESS OF SUPRAPUBIC REGION: ICD-10-CM

## 2022-10-04 LAB
ALBUMIN SERPL BCP-MCNC: 3.8 G/DL (ref 3.5–5)
ALP SERPL-CCNC: 73 U/L (ref 34–104)
ALT SERPL W P-5'-P-CCNC: 10 U/L (ref 7–52)
ANION GAP SERPL CALCULATED.3IONS-SCNC: 9 MMOL/L (ref 4–13)
AST SERPL W P-5'-P-CCNC: 16 U/L (ref 13–39)
BASOPHILS # BLD AUTO: 0.04 THOUSANDS/ΜL (ref 0–0.1)
BASOPHILS NFR BLD AUTO: 0 % (ref 0–1)
BILIRUB SERPL-MCNC: 0.38 MG/DL (ref 0.2–1)
BUN SERPL-MCNC: 21 MG/DL (ref 5–25)
CALCIUM SERPL-MCNC: 9.3 MG/DL (ref 8.4–10.2)
CHLORIDE SERPL-SCNC: 108 MMOL/L (ref 96–108)
CO2 SERPL-SCNC: 26 MMOL/L (ref 21–32)
CREAT SERPL-MCNC: 0.73 MG/DL (ref 0.6–1.3)
EOSINOPHIL # BLD AUTO: 1.27 THOUSAND/ΜL (ref 0–0.61)
EOSINOPHIL NFR BLD AUTO: 11 % (ref 0–6)
ERYTHROCYTE [DISTWIDTH] IN BLOOD BY AUTOMATED COUNT: 12 % (ref 11.6–15.1)
GFR SERPL CREATININE-BSD FRML MDRD: 84 ML/MIN/1.73SQ M
GLUCOSE SERPL-MCNC: 168 MG/DL (ref 65–140)
HCT VFR BLD AUTO: 39.4 % (ref 36.5–49.3)
HGB BLD-MCNC: 12.8 G/DL (ref 12–17)
IMM GRANULOCYTES # BLD AUTO: 0.05 THOUSAND/UL (ref 0–0.2)
IMM GRANULOCYTES NFR BLD AUTO: 0 % (ref 0–2)
LYMPHOCYTES # BLD AUTO: 2.49 THOUSANDS/ΜL (ref 0.6–4.47)
LYMPHOCYTES NFR BLD AUTO: 22 % (ref 14–44)
MCH RBC QN AUTO: 32 PG (ref 26.8–34.3)
MCHC RBC AUTO-ENTMCNC: 32.5 G/DL (ref 31.4–37.4)
MCV RBC AUTO: 99 FL (ref 82–98)
MONOCYTES # BLD AUTO: 0.91 THOUSAND/ΜL (ref 0.17–1.22)
MONOCYTES NFR BLD AUTO: 8 % (ref 4–12)
NEUTROPHILS # BLD AUTO: 6.72 THOUSANDS/ΜL (ref 1.85–7.62)
NEUTS SEG NFR BLD AUTO: 59 % (ref 43–75)
NRBC BLD AUTO-RTO: 0 /100 WBCS
PLATELET # BLD AUTO: 247 THOUSANDS/UL (ref 149–390)
PMV BLD AUTO: 10 FL (ref 8.9–12.7)
POTASSIUM SERPL-SCNC: 4.4 MMOL/L (ref 3.5–5.3)
PROT SERPL-MCNC: 7.4 G/DL (ref 6.4–8.4)
RBC # BLD AUTO: 4 MILLION/UL (ref 3.88–5.62)
SODIUM SERPL-SCNC: 143 MMOL/L (ref 135–147)
WBC # BLD AUTO: 11.48 THOUSAND/UL (ref 4.31–10.16)

## 2022-10-04 PROCEDURE — 99284 EMERGENCY DEPT VISIT MOD MDM: CPT

## 2022-10-04 PROCEDURE — 74177 CT ABD & PELVIS W/CONTRAST: CPT

## 2022-10-04 PROCEDURE — 85025 COMPLETE CBC W/AUTO DIFF WBC: CPT | Performed by: EMERGENCY MEDICINE

## 2022-10-04 PROCEDURE — 55100 DRAINAGE OF SCROTUM ABSCESS: CPT | Performed by: EMERGENCY MEDICINE

## 2022-10-04 PROCEDURE — 80053 COMPREHEN METABOLIC PANEL: CPT | Performed by: EMERGENCY MEDICINE

## 2022-10-04 PROCEDURE — 10060 I&D ABSCESS SIMPLE/SINGLE: CPT | Performed by: EMERGENCY MEDICINE

## 2022-10-04 PROCEDURE — G1004 CDSM NDSC: HCPCS

## 2022-10-04 PROCEDURE — 36415 COLL VENOUS BLD VENIPUNCTURE: CPT | Performed by: EMERGENCY MEDICINE

## 2022-10-04 PROCEDURE — 99284 EMERGENCY DEPT VISIT MOD MDM: CPT | Performed by: EMERGENCY MEDICINE

## 2022-10-04 RX ORDER — CLINDAMYCIN PALMITATE HYDROCHLORIDE 75 MG/5ML
450 SOLUTION ORAL ONCE
Status: COMPLETED | OUTPATIENT
Start: 2022-10-04 | End: 2022-10-04

## 2022-10-04 RX ORDER — CLINDAMYCIN PALMITATE HYDROCHLORIDE 75 MG/5ML
450 SOLUTION ORAL 3 TIMES DAILY
Qty: 630 ML | Refills: 0 | Status: SHIPPED | OUTPATIENT
Start: 2022-10-04 | End: 2022-10-11

## 2022-10-04 RX ORDER — LIDOCAINE HYDROCHLORIDE 10 MG/ML
10 INJECTION, SOLUTION EPIDURAL; INFILTRATION; INTRACAUDAL; PERINEURAL ONCE
Status: COMPLETED | OUTPATIENT
Start: 2022-10-04 | End: 2022-10-04

## 2022-10-04 RX ORDER — CLINDAMYCIN PALMITATE HYDROCHLORIDE 75 MG/5ML
450 SOLUTION ORAL EVERY 6 HOURS SCHEDULED
Status: DISCONTINUED | OUTPATIENT
Start: 2022-10-05 | End: 2022-10-04

## 2022-10-04 RX ADMIN — IOHEXOL 100 ML: 350 INJECTION, SOLUTION INTRAVENOUS at 20:40

## 2022-10-04 RX ADMIN — CLINDAMYCIN PALMITATE HYDROCHLORIDE (PEDIATRIC) 450 MG: 75 SOLUTION ORAL at 23:22

## 2022-10-04 RX ADMIN — LIDOCAINE HYDROCHLORIDE 10 ML: 10 INJECTION, SOLUTION EPIDURAL; INFILTRATION; INTRACAUDAL; PERINEURAL at 20:55

## 2022-10-04 NOTE — ED PROVIDER NOTES
History  Chief Complaint   Patient presents with    Abscess     Pts daughter reports an abscess on scrotum from an ingrown hair per ems        History provided by:  Patient and relative (daughter)  History limited by:  Dementia  Abscess  Abscess location: Scrotum, suprapubic region  Size:  Two separate, abscesses, 1 on the scrotum 1 the suprapubic region on the left both around 3 cm  Abscess quality: fluctuance, painful and redness    Red streaking: no    Duration:  5 days  Progression:  Worsening  Pain details:     Quality:  Unable to specify    Severity:  Unable to specify    Timing:  Unable to specify    Progression:  Worsening  Chronicity:  New  Context: diabetes    Relieved by:  None tried  Worsened by:  Nothing  Ineffective treatments:  None tried      Prior to Admission Medications   Prescriptions Last Dose Informant Patient Reported? Taking?    amLODIPine (NORVASC) 2 5 mg tablet   No No   Sig: Take 1 tablet (2 5 mg total) by mouth daily   aspirin (ECOTRIN LOW STRENGTH) 81 mg EC tablet   Yes No   Sig: Take 81 mg by mouth every morning   atorvastatin (LIPITOR) 20 mg tablet  Self No No   Sig: Take 1 tablet (20 mg total) by mouth daily   ergocalciferol (ERGOCALCIFEROL) 1 25 MG (91316 UT) capsule   Yes No   Sig: Take 50,000 Units by mouth every 30 (thirty) days Every 15th   levothyroxine 112 mcg tablet   No No   Sig: Take 1 tablet (112 mcg total) by mouth every morning   metFORMIN (GLUCOPHAGE) 1000 MG tablet  Child Yes No   Sig: Take 1,000 mg by mouth daily with breakfast     senna (SENOKOT) 8 6 MG tablet   Yes No   Sig: Take 1 tablet by mouth as needed    tamsulosin (FLOMAX) 0 4 mg   No No   Sig: Take 1 capsule (0 4 mg total) by mouth daily with dinner      Facility-Administered Medications: None       Past Medical History:   Diagnosis Date    Allergic     Lactose: Shellfish    Arthritis     Asthma     Bradycardia 03/24/2021    Dehydration 05/03/2022    Dementia ([address] )     Diabetes mellitus ([address] )     Diverticulitis of colon     Glaucoma     Leukocytosis 05/03/2022    Memory loss     Urinary tract infection        Past Surgical History:   Procedure Laterality Date    EYE SURGERY      TESTICLE SURGERY         Family History   Problem Relation Age of Onset    No Known Problems Mother     No Known Problems Father     No Known Problems Sister     No Known Problems Brother     No Known Problems Daughter     No Known Problems Sister     No Known Problems Sister     No Known Problems Brother     No Known Problems Brother     No Known Problems Brother     No Known Problems Brother     No Known Problems Daughter     No Known Problems Daughter     Colon cancer Neg Hx      I have reviewed and agree with the history as documented  E-Cigarette/Vaping    E-Cigarette Use Never User      E-Cigarette/Vaping Substances    Nicotine No     THC No     CBD No     Flavoring No     Other No     Unknown No      Social History     Tobacco Use    Smoking status: Never Smoker    Smokeless tobacco: Never Used   Vaping Use    Vaping Use: Never used   Substance Use Topics    Alcohol use: Not Currently    Drug use: Never       Review of Systems   Unable to perform ROS: Dementia       Physical Exam  Physical Exam  Vitals reviewed  Constitutional:       General: He is not in acute distress  Appearance: He is well-developed  He is not diaphoretic  HENT:      Head: Normocephalic and atraumatic  Right Ear: External ear normal       Left Ear: External ear normal       Nose: Nose normal    Eyes:      General:         Right eye: No discharge  Left eye: No discharge  Pupils: Pupils are equal, round, and reactive to light  Neck:      Trachea: No tracheal deviation  Cardiovascular:      Rate and Rhythm: Normal rate and regular rhythm  Heart sounds: Normal heart sounds  No murmur heard  Pulmonary:      Effort: Pulmonary effort is normal  No respiratory distress        Breath sounds: Normal breath sounds  No stridor  Abdominal:      General: There is no distension  Palpations: Abdomen is soft  Tenderness: There is no abdominal tenderness  There is no guarding or rebound  Genitourinary:     Comments: 2x   3 cm abscesses 1 in the left suprapubic region within his pubic hair  , other 1 on the underside of the scrotum  Musculoskeletal:         General: Normal range of motion  Cervical back: Normal range of motion and neck supple  Skin:     General: Skin is warm and dry  Coloration: Skin is not pale  Findings: No erythema  Neurological:      General: No focal deficit present  Mental Status: He is alert and oriented to person, place, and time           Vital Signs  ED Triage Vitals [10/04/22 1931]   Temperature Pulse Respirations Blood Pressure SpO2   98 8 °F (37 1 °C) 72 18 (!) 197/74 99 %      Temp Source Heart Rate Source Patient Position - Orthostatic VS BP Location FiO2 (%)   Axillary Monitor Lying Right arm --      Pain Score       --           Vitals:    10/04/22 1931   BP: (!) 197/74   Pulse: 72   Patient Position - Orthostatic VS: Lying         Visual Acuity      ED Medications  Medications   clindamycin (CLEOCIN) oral solution 450 mg (has no administration in time range)   iohexol (OMNIPAQUE) 350 MG/ML injection (SINGLE-DOSE) 100 mL (100 mL Intravenous Given 10/4/22 2040)   lidocaine (PF) (XYLOCAINE-MPF) 1 % injection 10 mL (10 mL Infiltration Given by Other 10/4/22 2055)       Diagnostic Studies  Results Reviewed     Procedure Component Value Units Date/Time    Comprehensive metabolic panel [[id number]]  (Abnormal) Collected: 10/04/22 1950    Lab Status: Final result Specimen: Blood from Arm, Left Updated: 10/04/22 2012     Sodium 143 mmol/L      Potassium 4 4 mmol/L      Chloride 108 mmol/L      CO2 26 mmol/L      ANION GAP 9 mmol/L      BUN 21 mg/dL      Creatinine 0 73 mg/dL      Glucose 168 mg/dL      Calcium 9 3 mg/dL      AST 16 U/L      ALT 10 U/L Alkaline Phosphatase 73 U/L      Total Protein 7 4 g/dL      Albumin 3 8 g/dL      Total Bilirubin 0 38 mg/dL      eGFR 84 ml/min/1 73sq m     Narrative:      [place] guidelines for Chronic Kidney Disease (CKD):     Stage 1 with normal or high GFR (GFR > 90 mL/min/1 73 square meters)    Stage 2 Mild CKD (GFR = 60-89 mL/min/1 73 square meters)    Stage 3A Moderate CKD (GFR = 45-59 mL/min/1 73 square meters)    Stage 3B Moderate CKD (GFR = 30-44 mL/min/1 73 square meters)    Stage 4 Severe CKD (GFR = 15-29 mL/min/1 73 square meters)    Stage 5 End Stage CKD (GFR <15 mL/min/1 73 square meters)  Note: GFR calculation is accurate only with a steady state creatinine    CBC and differential [[id number]]  (Abnormal) Collected: 10/04/22 1950    Lab Status: Final result Specimen: Blood from Arm, Left Updated: 10/04/22 1959     WBC 11 48 Thousand/uL      RBC 4 00 Million/uL      Hemoglobin 12 8 g/dL      Hematocrit 39 4 %      MCV 99 fL      MCH 32 0 pg      MCHC 32 5 g/dL      RDW 12 0 %      MPV 10 0 fL      Platelets 749 Thousands/uL      nRBC 0 /100 WBCs      Neutrophils Relative 59 %      Immat GRANS % 0 %      Lymphocytes Relative 22 %      Monocytes Relative 8 %      Eosinophils Relative 11 %      Basophils Relative 0 %      Neutrophils Absolute 6 72 Thousands/µL      Immature Grans Absolute 0 05 Thousand/uL      Lymphocytes Absolute 2 49 Thousands/µL      Monocytes Absolute 0 91 Thousand/µL      Eosinophils Absolute 1 27 Thousand/µL      Basophils Absolute 0 04 Thousands/µL                  CT abdomen pelvis with contrast   Final Result by Og Minaya MD (10/04 2133)      1  Inflammation and fluid throughout the scrotum, limited evaluation  Fluid in the left inguinal canal   Recommend ultrasound  2   Focal area of left suprapubic cutaneous and subcutaneous cellulitis and subcutaneous 1 3 cm abscess    No evidence of fistulous tract with the adjacent inguinal canal    3  Limited evaluation of the perineum given surrounding scrotal inflammation  No evidence of gas  4   Severe constipation and rectal impaction  No evidence of bowel obstruction, colitis or diverticulitis  5   No free intraperitoneal or retroperitoneal air or fluid collection  Workstation performed: [id number]                    Procedures  Incision and drain    Date/Time: 10/4/2022 9:50 PM  Performed by: [facility] DO  Authorized by: [address] [place] [address] Avenue,      Patient location:  ED  Location:     Type:  Abscess    Size:  3cm    Location:  Anogenital    Anogenital location:  Scrotal wall  Pre-procedure details:     Skin preparation:  Betadine  Anesthesia (see MAR for exact dosages): Anesthesia method:  Local infiltration    Local anesthetic:  Lidocaine 1% w/o epi  Procedure details:     Complexity:  Simple    Needle aspiration: no      Incision types:  Single straight    Scalpel blade:  11    Approach:  Open    Wound management:  Irrigated with saline, extensive cleaning and probed and deloculated    Drainage:  Purulent    Drainage amount: Moderate    Wound treatment:  Wound left open    Packing materials:  None  Post-procedure details:     Patient tolerance of procedure: Tolerated well, no immediate complications  Incision and drain    Date/Time: 10/4/2022 9:45 PM  Performed by: [facility] Lynn DO  Authorized by: [address] [place] [address] Avenue, DO   [place] Protocol:  Procedure performed by:    Patient location:  ED  Location:     Type:  Abscess    Size:  3cm    Location: Suprapubic region  Pre-procedure details:     Skin preparation:  Betadine  Anesthesia (see MAR for exact dosages): Anesthesia method:  Local infiltration    Local anesthetic:  Lidocaine 1% w/o epi  Procedure details:     Complexity:  Simple    Incision types:  Single straight    Scalpel blade:  15    Approach:  Open    Drainage:  Purulent    Drainage amount:   Moderate    Wound treatment:  Wound left open    Packing materials: None  Post-procedure details:     Patient tolerance of procedure:   Tolerated well, no immediate complications             ED Course  ED Course as of 10/04/22 2227   Tue Oct 04, 2022   2144 Long conversation with patient's family regarding abscess, and need for ultrasound for better clarification, we discussed incision and drainage we discussed ultrasound they understand ultrasound the uncomfortable may require some degree of sedation as due to advanced dementia he will likely not hold still, they do not want to proceed with any of this they are agreeable to simple I and D, placed on antibiotics and strict return parameters   2158 Incision and drainage of both abscesses, moderate amount of pus obtained from both, long discussion again with family, they are agreeable and comfortable discharge plan                                             MDM  Number of Diagnoses or Management Options  Abscess of suprapubic region: new and requires workup  Scrotal wall abscess: new and requires workup  Diagnosis management comments:       Initial ED assessment:  82-year-old male, pleasantly demented presents with 2 separate abscesses, 1 the suprapubic region 1 on his scrotum    Initial DDx includes but is not limited to:   Simple cutaneous abscess is verses deep-seated abscess is/early Corazon's gangrene    Initial ED plan:   CT scan, I&D        Final ED summary/disposition:   After evaluation and workup in the emergency department, found to have abscesses, scrotal abscess possibly more complex we discussed ultrasound at length, they did not want to proceed with this, did perform I&D and moderate amount of pus drained, will discharge, patient placed on clindamycin, strict return parameters       Amount and/or Complexity of Data Reviewed  Clinical lab tests: reviewed and ordered  Tests in the radiology section of CPT®: ordered and reviewed  Decide to obtain previous medical records or to obtain history from someone other than the patient: yes  Obtain history from someone other than the patient: yes  Review and summarize past medical records: yes  Independent visualization of images, tracings, or specimens: yes        Disposition  Final diagnoses:   Scrotal wall abscess   Abscess of suprapubic region     Time reflects when diagnosis was documented in both MDM as applicable and the Disposition within this note     Time User Action Codes Description Comment    10/4/2022 10:01 PM Marifer Ribera [N49 [id number]] Scrotal wall abscess     10/4/2022 10:01 PM Marifer Mirza Add [L02 [id number]] Abscess of suprapubic region       ED Disposition     ED Disposition   Discharge    Condition   Stable    Date/Time   Tue Oct 4, 2022  9:58 PM    Comment   Debbie Giraldo discharge to home/self care  Follow-up Information    None         Patient's Medications   Discharge Prescriptions    CLINDAMYCIN (CLEOCIN) 75 MG/5 ML SOLUTION    Take 30 mL (450 mg total) by mouth 3 (three) times a day for 7 days       Start Date: 10/4/2022 End Date: 10/11/2022       Order Dose: 450 mg       Quantity: 630 mL    Refills: 0       No discharge procedures on file      PDMP Review       Value Time User    PDMP Reviewed  Yes 5/2/2022 11:45 PM Alex Hernandez DO          ED Provider  Electronically Signed by           Kari Gilbert DO  10/04/22 8421

## 2022-10-05 VITALS
OXYGEN SATURATION: 98 % | HEART RATE: 61 BPM | TEMPERATURE: 98.8 F | DIASTOLIC BLOOD PRESSURE: 72 MMHG | RESPIRATION RATE: 16 BRPM | SYSTOLIC BLOOD PRESSURE: 171 MMHG

## 2022-10-05 NOTE — DISCHARGE INSTRUCTIONS
Please try to keep the scrotal area clean, as we discussed please apply Desitin heavily to the area, please change him promptly upon having a bowel movement if you do notice any soiling of stool next to the incision site, please perform a bath and wash the area thoroughly    Please return to the emergency department for any fevers any worsening redness in the area or any other signs of infection or worsening

## 2023-06-27 NOTE — ASSESSMENT & PLAN NOTE
POA: Pt's daughter states that she witnessed her father take a sip of juice at the dinner table and then watched his hands start shaking and his arms began moving back and forth uncontrollably which lasted for a few minutes then resolved  Pt's daughter states that pt has baseline tremor but never that severe  CT head without contrast, Result Date: 5/2/2022:  No acute pathology  Mild superimposed hydrocephalus  Correlate for any clinical evidence of normal pressure hydrocephalus  Magnesium 1 7  TSH low, elevated Free T4 on admission    Suspicion for worsening baseline tremor d/t supratherapeutic levothyroxine dose, deconditioning, and UTI    5/5 - EEG finding: a mild generalized non-specific encephalopathy  No electrographic seizures, interictal epileptiform discharges (seizure tendency) or focal slowing were seen  No diagnostic clinical events were captured       Plan:   · Decreased levothyroxine dose to 112mg QD, repeat TSH in 4-6 weeks outpatient  · Discharge with 5 day course of Keflex to complete 7 day course of antibiotics for UTI  · Patient is medically stable to be discharged home with home health care which they have 1 already set up  · Patient will follow-up with Neurology outpatient in 4-6 weeks  · Follow-up with PCP in a week  · Follow-up with geriatric medicine in a few weeks PAST MEDICAL HISTORY:  Asthma and Brochitis    BPH without urinary obstruction     Bulging lumbar disc     Cataract     Chronic GERD     DVT, lower extremity 2014 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    High cholesterol     History of alopecia areata     Hypertension     Seasonal allergies     Sleep apnea use CPAP     PAST MEDICAL HISTORY:  Acid reflux     Afib     Asthma and Brochitis    BPH without urinary obstruction     Bulging lumbar disc     Cataract     Chronic GERD     DVT, lower extremity 2014 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

## 2023-08-15 ENCOUNTER — LAB REQUISITION (OUTPATIENT)
Dept: LAB | Facility: HOSPITAL | Age: 88
End: 2023-08-15
Payer: MEDICARE

## 2023-08-15 DIAGNOSIS — Z00.00 ENCOUNTER FOR GENERAL ADULT MEDICAL EXAMINATION WITHOUT ABNORMAL FINDINGS: ICD-10-CM

## 2023-08-15 LAB
25(OH)D3 SERPL-MCNC: 36.5 NG/ML (ref 30–100)
ANION GAP SERPL CALCULATED.3IONS-SCNC: 4 MMOL/L
BASOPHILS # BLD AUTO: 0.06 THOUSANDS/ÂΜL (ref 0–0.1)
BASOPHILS NFR BLD AUTO: 1 % (ref 0–1)
BUN SERPL-MCNC: 18 MG/DL (ref 5–25)
CALCIUM SERPL-MCNC: 9.5 MG/DL (ref 8.3–10.1)
CHLORIDE SERPL-SCNC: 110 MMOL/L (ref 96–108)
CHOLEST SERPL-MCNC: 179 MG/DL
CO2 SERPL-SCNC: 28 MMOL/L (ref 21–32)
CREAT SERPL-MCNC: 0.62 MG/DL (ref 0.6–1.3)
EOSINOPHIL # BLD AUTO: 1.47 THOUSAND/ÂΜL (ref 0–0.61)
EOSINOPHIL NFR BLD AUTO: 20 % (ref 0–6)
ERYTHROCYTE [DISTWIDTH] IN BLOOD BY AUTOMATED COUNT: 14 % (ref 11.6–15.1)
EST. AVERAGE GLUCOSE BLD GHB EST-MCNC: 143 MG/DL
GFR SERPL CREATININE-BSD FRML MDRD: 89 ML/MIN/1.73SQ M
GLUCOSE SERPL-MCNC: 120 MG/DL (ref 65–140)
HBA1C MFR BLD: 6.6 %
HCT VFR BLD AUTO: 37.2 % (ref 36.5–49.3)
HDLC SERPL-MCNC: 63 MG/DL
HGB BLD-MCNC: 11.6 G/DL (ref 12–17)
IMM GRANULOCYTES # BLD AUTO: 0.02 THOUSAND/UL (ref 0–0.2)
IMM GRANULOCYTES NFR BLD AUTO: 0 % (ref 0–2)
LDLC SERPL CALC-MCNC: 103 MG/DL (ref 0–100)
LYMPHOCYTES # BLD AUTO: 2.72 THOUSANDS/ÂΜL (ref 0.6–4.47)
LYMPHOCYTES NFR BLD AUTO: 37 % (ref 14–44)
MCH RBC QN AUTO: 31 PG (ref 26.8–34.3)
MCHC RBC AUTO-ENTMCNC: 31.2 G/DL (ref 31.4–37.4)
MCV RBC AUTO: 100 FL (ref 82–98)
MONOCYTES # BLD AUTO: 0.65 THOUSAND/ÂΜL (ref 0.17–1.22)
MONOCYTES NFR BLD AUTO: 9 % (ref 4–12)
NEUTROPHILS # BLD AUTO: 2.45 THOUSANDS/ÂΜL (ref 1.85–7.62)
NEUTS SEG NFR BLD AUTO: 33 % (ref 43–75)
NONHDLC SERPL-MCNC: 116 MG/DL
NRBC BLD AUTO-RTO: 0 /100 WBCS
PLATELET # BLD AUTO: 251 THOUSANDS/UL (ref 149–390)
PMV BLD AUTO: 10.1 FL (ref 8.9–12.7)
POTASSIUM SERPL-SCNC: 4.7 MMOL/L (ref 3.5–5.3)
RBC # BLD AUTO: 3.74 MILLION/UL (ref 3.88–5.62)
SODIUM SERPL-SCNC: 142 MMOL/L (ref 135–147)
TRIGL SERPL-MCNC: 67 MG/DL
TSH SERPL DL<=0.05 MIU/L-ACNC: 2.28 UIU/ML (ref 0.45–4.5)
WBC # BLD AUTO: 7.37 THOUSAND/UL (ref 4.31–10.16)

## 2023-08-15 PROCEDURE — 80061 LIPID PANEL: CPT | Performed by: FAMILY MEDICINE

## 2023-08-15 PROCEDURE — 84443 ASSAY THYROID STIM HORMONE: CPT | Performed by: FAMILY MEDICINE

## 2023-08-15 PROCEDURE — 80048 BASIC METABOLIC PNL TOTAL CA: CPT | Performed by: FAMILY MEDICINE

## 2023-08-15 PROCEDURE — 82306 VITAMIN D 25 HYDROXY: CPT | Performed by: FAMILY MEDICINE

## 2023-08-15 PROCEDURE — 85025 COMPLETE CBC W/AUTO DIFF WBC: CPT | Performed by: FAMILY MEDICINE

## 2023-08-15 PROCEDURE — 83036 HEMOGLOBIN GLYCOSYLATED A1C: CPT | Performed by: FAMILY MEDICINE

## 2023-11-08 ENCOUNTER — APPOINTMENT (OUTPATIENT)
Dept: LAB | Facility: HOSPITAL | Age: 88
End: 2023-11-08
Payer: MEDICARE

## 2023-11-08 DIAGNOSIS — E11.9 TYPE 2 DIABETES MELLITUS WITHOUT COMPLICATION, WITHOUT LONG-TERM CURRENT USE OF INSULIN (HCC): ICD-10-CM

## 2023-11-08 LAB
ANION GAP SERPL CALCULATED.3IONS-SCNC: 6 MMOL/L
BUN SERPL-MCNC: 23 MG/DL (ref 5–25)
CALCIUM SERPL-MCNC: 9.3 MG/DL (ref 8.4–10.2)
CHLORIDE SERPL-SCNC: 111 MMOL/L (ref 96–108)
CO2 SERPL-SCNC: 29 MMOL/L (ref 21–32)
CREAT SERPL-MCNC: 0.72 MG/DL (ref 0.6–1.3)
GFR SERPL CREATININE-BSD FRML MDRD: 84 ML/MIN/1.73SQ M
GLUCOSE SERPL-MCNC: 88 MG/DL (ref 65–140)
POTASSIUM SERPL-SCNC: 4.8 MMOL/L (ref 3.5–5.3)
SODIUM SERPL-SCNC: 146 MMOL/L (ref 135–147)
TSH SERPL DL<=0.05 MIU/L-ACNC: 1.06 UIU/ML (ref 0.45–4.5)
URATE SERPL-MCNC: 4.5 MG/DL (ref 3.5–8.5)

## 2023-11-08 PROCEDURE — 80048 BASIC METABOLIC PNL TOTAL CA: CPT

## 2023-11-08 PROCEDURE — 85027 COMPLETE CBC AUTOMATED: CPT

## 2023-11-08 PROCEDURE — 36415 COLL VENOUS BLD VENIPUNCTURE: CPT

## 2023-11-08 PROCEDURE — 84443 ASSAY THYROID STIM HORMONE: CPT

## 2023-11-08 PROCEDURE — 85007 BL SMEAR W/DIFF WBC COUNT: CPT

## 2023-11-08 PROCEDURE — 84550 ASSAY OF BLOOD/URIC ACID: CPT

## 2023-11-09 LAB
BASOPHILS # BLD MANUAL: 0.08 THOUSAND/UL (ref 0–0.1)
BASOPHILS NFR MAR MANUAL: 1 % (ref 0–1)
DIFFERENTIAL COMMENT: ABNORMAL
EOSINOPHIL # BLD MANUAL: 0.96 THOUSAND/UL (ref 0–0.4)
EOSINOPHIL NFR BLD MANUAL: 12 % (ref 0–6)
ERYTHROCYTE [DISTWIDTH] IN BLOOD BY AUTOMATED COUNT: 14 % (ref 11.6–15.1)
HCT VFR BLD AUTO: 37.9 % (ref 36.5–49.3)
HGB BLD-MCNC: 12.1 G/DL (ref 12–17)
LYMPHOCYTES # BLD AUTO: 2.57 THOUSAND/UL (ref 0.6–4.47)
LYMPHOCYTES # BLD AUTO: 29 % (ref 14–44)
MCH RBC QN AUTO: 31.6 PG (ref 26.8–34.3)
MCHC RBC AUTO-ENTMCNC: 31.9 G/DL (ref 31.4–37.4)
MCV RBC AUTO: 99 FL (ref 82–98)
MONOCYTES # BLD AUTO: 0.08 THOUSAND/UL (ref 0–1.22)
MONOCYTES NFR BLD: 1 % (ref 4–12)
NEUTROPHILS # BLD MANUAL: 4.34 THOUSAND/UL (ref 1.85–7.62)
NEUTS SEG NFR BLD AUTO: 54 % (ref 43–75)
PATHOLOGY REVIEW: YES
PLATELET # BLD AUTO: 263 THOUSANDS/UL (ref 149–390)
PLATELET BLD QL SMEAR: ADEQUATE
PLATELET CLUMP BLD QL SMEAR: PRESENT
PMV BLD AUTO: 10.3 FL (ref 8.9–12.7)
RBC # BLD AUTO: 3.83 MILLION/UL (ref 3.88–5.62)
RBC MORPH BLD: NORMAL
VARIANT LYMPHS # BLD AUTO: 3 %
WBC # BLD AUTO: 8.04 THOUSAND/UL (ref 4.31–10.16)

## 2024-01-23 NOTE — ED NOTES
Pt refused morning dose of synthroid and failed two, RN dysphagia assessments   Provider aware and put in order for speech gama Becerra, RN  05/03/22 5927 [No Acute Distress] : no acute distress [III] : Mallampati Class: III [Normal Appearance] : normal appearance [No Neck Mass] : no neck mass [Normal S1, S2] : normal s1, s2 [Normal Rate/Rhythm] : normal rate/rhythm [No Murmurs] : no murmurs [No Resp Distress] : no resp distress [Clear to Auscultation Bilaterally] : clear to auscultation bilaterally [No Abnormalities] : no abnormalities [Normal Gait] : normal gait [No Clubbing] : no clubbing [No Cyanosis] : no cyanosis [No Edema] : no edema [FROM] : FROM [No Focal Deficits] : no focal deficits [Oriented x3] : oriented x3 [Normal Affect] : normal affect

## 2025-02-24 ENCOUNTER — TELEPHONE (OUTPATIENT)
Dept: FAMILY MEDICINE CLINIC | Facility: CLINIC | Age: 89
End: 2025-02-24

## 2025-02-25 NOTE — TELEPHONE ENCOUNTER
Addended by: NOY TAN on: 12/6/2018 02:47 PM     Modules accepted: Orders     Pt passed away 9/9/24 confirmed by daughter Sheila. Patient's chart will be marked. Just wanted to let you know.

## 2025-06-12 NOTE — ASSESSMENT & PLAN NOTE
"Recommend continued symptom-triggered management via CIWA with either phenobarbital or diazepam.  For phenobarbital dosing, can use:   130 mg IV for mild to moderate symptoms and phenobarbital 260 mg IV for moderate to severe symptoms.  Titrate to RASS -1 and hold for significant sedation  Maximum total dose of phenobarbital is 2 grams.  if patient is approaching 2 grams of phenobarbital with continued withdrawal symptoms:  Recommend escalation of care for adjunctive use of dexmedetomidine or ketamine infusions  Can start ketamine infusion at 0.3 mg/kg/hr for 24 hours. Beyond this please use 0.15 mg/kg/hr until clinical improvement  If using dexmedetomidine infusion, please use adjunctive benzodiazepines as dexmedetomidine has no direct effect at the CAM receptor  Alternatively can also use a diazepam-based CIWA. Please see below for dosing regimen.       Medical Toxicology recommendations for CIWA monitoring using diazepam for treatment:    CIWA score & Treatment     Monitoring:   Modified CIWA Score   Telemetry  Continuous pulse oximetry   Initiate RASS with dosing and hold any sedatives for RASS less than -2  Request provider re-evaluation after every three doses.  Step down for CIWA > 7  Contact Medical Toxicology/Addiction \"[facility] On Call\" via [employer] for worsening CIWA, persistent tachycardia or encephalopathy.       0  No intervention  Reassess q4 hours.   Consider discontinuing CIWA 24 hours after ethanol concentration of zero, with a score of zero    1-7  Diazepam 10 mg PO Q4hr PRN score 1-7  Reassess q4hr    8-14  Diazepam 10mg IV Q1hr PRN score 8-14  Reassess q1hr  Contact Medical Toxicology/Addiction \"[facility] On Call\" via [employer] to discuss transfer to detox unit, step down or critical care per [facility]ox [facility].    15-19  Diazepam 20mg IV Q1hr PRN score 15-19  Reassess q1hr  Contact Medical Toxicology/Addiction \"[facility] On Call\" via [employer] to discuss transfer to detox unit, " · Likely secondary to hypotension due over treatment with antiHTN meds  · Orthostats BPs: negative  · Hold antiHTN meds  no need to restart    · Outpatient ECHO if desired "step down or critical care per [facility] and further treatment recommendations.    >20  Diazepam 40mg IV Q1hr PRN score > 20  Contact Medical Toxicology/Addiction \"[facility] On Call\" via [employer] for additional treatment recommendations.       Once the patient's withdrawal is effectively managed, the patient can be placed on a diazepam taper, if needed.    Diazepam can be decreased by 1/2 of the last dose every hour until the patient is not needing more than 10 mg at a time; at which point diazepam 5mg PO  may be given Q6 hours PRN for 24 hours. Prior to discontinuation.     Please reach out to Medical Toxicology/Addiction \"[facility] On Call\" via [employer] with any additional concerns.     "